# Patient Record
Sex: FEMALE | Race: OTHER | Employment: OTHER | URBAN - METROPOLITAN AREA
[De-identification: names, ages, dates, MRNs, and addresses within clinical notes are randomized per-mention and may not be internally consistent; named-entity substitution may affect disease eponyms.]

---

## 2017-01-10 ENCOUNTER — TELEPHONE (OUTPATIENT)
Dept: GASTROENTEROLOGY | Facility: CLINIC | Age: 72
End: 2017-01-10

## 2017-01-10 NOTE — TELEPHONE ENCOUNTER
[facility] is contacted again now to speak with live person/RN since procedure is scheduled in 2 days; call transferred to intake dept. - on hold for 15 minutes and then [facility] [address] came on line; she is not RN; she advised calling tomorrow morning to check status, but

## 2017-01-10 NOTE — TELEPHONE ENCOUNTER
Per Song Singh in insurance verification, 1/12/17 CLN requires prior authorization with [facility]- call [phone].

## 2017-01-10 NOTE — TELEPHONE ENCOUNTER
[facility] contacted re: below- on hold for 11 minutes and then request came on message to leave voicemail; same no is called again now in hopes of speaking with live person- on hold this time for 10 minutes, then Donnie Esteves came on line- reference no.: [id number]; call

## 2017-01-12 ENCOUNTER — ANESTHESIA (OUTPATIENT)
Dept: ENDOSCOPY | Facility: HOSPITAL | Age: 72
End: 2017-01-12
Payer: COMMERCIAL

## 2017-01-12 ENCOUNTER — SURGERY (OUTPATIENT)
Age: 72
End: 2017-01-12

## 2017-01-12 ENCOUNTER — ANESTHESIA EVENT (OUTPATIENT)
Dept: ENDOSCOPY | Facility: HOSPITAL | Age: 72
End: 2017-01-12
Payer: COMMERCIAL

## 2017-01-12 RX ORDER — LABETALOL HYDROCHLORIDE 5 MG/ML
INJECTION, SOLUTION INTRAVENOUS AS NEEDED
Status: DISCONTINUED | OUTPATIENT
Start: 2017-01-12 | End: 2017-01-12 | Stop reason: SURG

## 2017-01-12 RX ORDER — SODIUM CHLORIDE, SODIUM LACTATE, POTASSIUM CHLORIDE, CALCIUM CHLORIDE 600; 310; 30; 20 MG/100ML; MG/100ML; MG/100ML; MG/100ML
INJECTION, SOLUTION INTRAVENOUS CONTINUOUS PRN
Status: DISCONTINUED | OUTPATIENT
Start: 2017-01-12 | End: 2017-01-12 | Stop reason: SURG

## 2017-01-12 RX ADMIN — SODIUM CHLORIDE, SODIUM LACTATE, POTASSIUM CHLORIDE, CALCIUM CHLORIDE: 600; 310; 30; 20 INJECTION, SOLUTION INTRAVENOUS at 10:34:00

## 2017-01-12 RX ADMIN — LABETALOL HYDROCHLORIDE 5 MG: 5 INJECTION, SOLUTION INTRAVENOUS at 10:52:00

## 2017-01-12 RX ADMIN — LABETALOL HYDROCHLORIDE 5 MG: 5 INJECTION, SOLUTION INTRAVENOUS at 10:55:00

## 2017-01-12 NOTE — ANESTHESIA PREPROCEDURE EVALUATION
Anesthesia PreOp Note    HPI:     Cal Labrum is a 80year old female who presents for preoperative consultation requested by: Juan Camacho MD    Date of Surgery: 1/12/2017    Procedure(s):  COLONOSCOPY  Indication: Diverticulitis of large intestin OTHER SURGICAL HISTORY  1995    Comment Open cholecystectomy    ARTHROSCOPY, SHOULDER, SURGI      Comment 2015 rotator cufff repain    ARTHROSCOPY, SHOULDER, SURGICAL; W/ROTATOR CUFF REPAIR Right 5/27/2015    Comment Procedure: ARTHROSCOPY SHOULDER WITH R FREESTYLE TEST In Vitro Strip TEST TWICE DAILY Disp: 100 strip Rfl: 3 Taking   insulin detemir (LEVEMIR FLEXTOUCH) 100 UNIT/ML Subcutaneous Solution Pen-injector Inject 25 Units into the skin daily.  Disp: 1 pen Rfl: 11 Taking   FreeStyle Lancets Does not a height is 5' 6\" and weight is 180 lb. Her blood pressure is 150/82 and her pulse is 69.  Her respiration is 12 and oxygen saturation is 96%.    01/12/17  1013   BP: 150/82   Pulse: 69   Resp: 12   Height: 5' 6\"   Weight: 180 lb   SpO2: 96%        Anesthe

## 2017-01-12 NOTE — ANESTHESIA POSTPROCEDURE EVALUATION
Patient: Krystle Rao    Procedure Summary     Date Anesthesia Start Anesthesia Stop Room / Location    01/12/17 1032 1106 [facility] ENDOSCOPY 05 / [facility] ENDOSCOPY       Procedure Diagnosis Surgeon Responsible Provider    COLONOSCOPY (N/A ) Diverticulitis of lar

## 2017-02-24 DIAGNOSIS — E11.9 TYPE 2 DIABETES MELLITUS TREATED WITHOUT INSULIN (HCC): Primary | ICD-10-CM

## 2017-02-24 NOTE — TELEPHONE ENCOUNTER
Patient needs diabetic panel follow-up this month diagnosis diabetes mellitus non-insulin-requiring no complications.   May renew the losartan

## 2017-02-28 RX ORDER — LOSARTAN POTASSIUM 50 MG/1
50 TABLET ORAL DAILY
Qty: 90 TABLET | Refills: 0 | Status: SHIPPED | OUTPATIENT
Start: 2017-02-28 | End: 2017-04-17

## 2017-02-28 RX ORDER — GLIMEPIRIDE 4 MG/1
4 TABLET ORAL 2 TIMES DAILY
Qty: 180 TABLET | Refills: 0 | Status: SHIPPED | OUTPATIENT
Start: 2017-02-28 | End: 2017-04-17

## 2017-02-28 RX ORDER — INSULIN DETEMIR 100 [IU]/ML
INJECTION, SOLUTION SUBCUTANEOUS
Qty: 15 ML | Refills: 0 | Status: SHIPPED | OUTPATIENT
Start: 2017-02-28 | End: 2017-06-29

## 2017-02-28 NOTE — TELEPHONE ENCOUNTER
Sent per clinical decision with note to complete pending lab and schedule f/u  Diabetes Medications  Protocol Criteria:  · Appointment scheduled in the past 6 months or the next 3 months  · A1C < 7.5 in the past 6 months  · Creatinine in the past 12 months

## 2017-04-15 ENCOUNTER — TELEPHONE (OUTPATIENT)
Dept: FAMILY MEDICINE CLINIC | Facility: CLINIC | Age: 72
End: 2017-04-15

## 2017-04-15 NOTE — TELEPHONE ENCOUNTER
EBONIE - Belarusian; Patient called wanting to make an appointment with Dr. Christelle Gutierrez regarding a hard cough, flem, headaches, and sweats.  The doctor's schedule is far out in May and did not want to wait so long for an appointment - offered her a different doctor and

## 2017-04-15 NOTE — TELEPHONE ENCOUNTER
Actions Requested: seeking appt, appt made, routed to [facility] as FYI  Situation/Background   Problem: productive cough   Onset: 3 days   Associated Symptoms: productive cough-thick mucus, nasal congestion, sinus congestion with headaches, sweats.  Pt denied fev

## 2017-04-17 ENCOUNTER — OFFICE VISIT (OUTPATIENT)
Dept: FAMILY MEDICINE CLINIC | Facility: CLINIC | Age: 72
End: 2017-04-17

## 2017-04-17 VITALS
SYSTOLIC BLOOD PRESSURE: 137 MMHG | WEIGHT: 177.81 LBS | DIASTOLIC BLOOD PRESSURE: 80 MMHG | HEIGHT: 63 IN | HEART RATE: 73 BPM | TEMPERATURE: 98 F | BODY MASS INDEX: 31.5 KG/M2

## 2017-04-17 DIAGNOSIS — E11.9 DIABETES MELLITUS WITH INSULIN THERAPY (HCC): Primary | ICD-10-CM

## 2017-04-17 DIAGNOSIS — Z79.4 DIABETES MELLITUS WITH INSULIN THERAPY (HCC): Primary | ICD-10-CM

## 2017-04-17 DIAGNOSIS — R05.9 COUGH: ICD-10-CM

## 2017-04-17 PROCEDURE — 99212 OFFICE O/P EST SF 10 MIN: CPT | Performed by: FAMILY MEDICINE

## 2017-04-17 PROCEDURE — 99214 OFFICE O/P EST MOD 30 MIN: CPT | Performed by: FAMILY MEDICINE

## 2017-04-17 RX ORDER — GLIMEPIRIDE 4 MG/1
4 TABLET ORAL 2 TIMES DAILY
Qty: 180 TABLET | Refills: 2 | Status: SHIPPED | OUTPATIENT
Start: 2017-04-17 | End: 2017-08-08

## 2017-04-17 RX ORDER — LOSARTAN POTASSIUM 50 MG/1
50 TABLET ORAL DAILY
Qty: 90 TABLET | Refills: 2 | Status: SHIPPED | OUTPATIENT
Start: 2017-04-17 | End: 2017-08-08

## 2017-04-17 RX ORDER — AZITHROMYCIN 250 MG/1
TABLET, FILM COATED ORAL
Qty: 6 TABLET | Refills: 0 | Status: SHIPPED | OUTPATIENT
Start: 2017-04-17 | End: 2017-05-08 | Stop reason: ALTCHOICE

## 2017-04-17 RX ORDER — BENZONATATE 200 MG/1
200 CAPSULE ORAL 3 TIMES DAILY PRN
Qty: 30 CAPSULE | Refills: 0 | Status: SHIPPED | OUTPATIENT
Start: 2017-04-17 | End: 2017-05-08 | Stop reason: ALTCHOICE

## 2017-04-17 NOTE — PROGRESS NOTES
HPI:   Aimee Marquez is a 71year old female who presents for upper respiratory symptoms for  3  days. Patient reports low grade fever, cough with yellow colored sputum. Reports significant fatigue also with this. Has not checked her glucose.        Medardo Deras hypertension    • Hypercholesterolemia    • Cold with flu    • Reflux      Takes OTC meds PRN \"Tums\"   • High blood pressure           Past Surgical History    ELECTROCARDIOGRAM, COMPLETE  4/21/2014    Comment Scanned to media tab    HYSTERECTOMY  2014 rashes  EYES:conjunctiva are clear  HEENT: atraumatic, normocephalic,ears and throat are clear. Nl TMs  NECK: supple,no adenopathy,no bruits  LUNGS: clear to auscultation  CARDIO: RRR without murmur      ASSESSMENT AND PLAN:   1.  Diabetes mellitus with ins

## 2017-04-19 ENCOUNTER — LAB ENCOUNTER (OUTPATIENT)
Dept: LAB | Facility: HOSPITAL | Age: 72
End: 2017-04-19
Attending: FAMILY MEDICINE
Payer: COMMERCIAL

## 2017-04-19 DIAGNOSIS — Z79.4 DIABETES MELLITUS WITH INSULIN THERAPY (HCC): ICD-10-CM

## 2017-04-19 DIAGNOSIS — E11.9 DIABETES MELLITUS WITH INSULIN THERAPY (HCC): ICD-10-CM

## 2017-04-19 PROCEDURE — 36415 COLL VENOUS BLD VENIPUNCTURE: CPT

## 2017-04-19 PROCEDURE — 80053 COMPREHEN METABOLIC PANEL: CPT

## 2017-04-19 PROCEDURE — 82570 ASSAY OF URINE CREATININE: CPT

## 2017-04-19 PROCEDURE — 82043 UR ALBUMIN QUANTITATIVE: CPT

## 2017-04-19 PROCEDURE — 80061 LIPID PANEL: CPT

## 2017-04-19 PROCEDURE — 83036 HEMOGLOBIN GLYCOSYLATED A1C: CPT

## 2017-04-19 PROCEDURE — 84443 ASSAY THYROID STIM HORMONE: CPT

## 2017-04-19 PROCEDURE — 85025 COMPLETE CBC W/AUTO DIFF WBC: CPT

## 2017-04-20 NOTE — PROGRESS NOTES
Quick Note:    Diabetes completely uncontrolled. Please talk to her about. taking her medications - insulin daily.  She often skips  ______

## 2017-04-21 ENCOUNTER — TELEPHONE (OUTPATIENT)
Dept: FAMILY MEDICINE CLINIC | Facility: CLINIC | Age: 72
End: 2017-04-21

## 2017-04-21 NOTE — TELEPHONE ENCOUNTER
----- Message from Derian Valencia MD sent at 4/20/2017  1:06 PM CDT -----  Diabetes completely uncontrolled. Please talk to her about. taking her medications - insulin daily.  She often skips

## 2017-04-22 NOTE — TELEPHONE ENCOUNTER
Spoke with patient verified date of birth and full name, patient was relayed  message below. Pt was inform that her sugar levels are really high and she needs to take per pills and inject her insulin everyday.  These level can have a effect on her kidn

## 2017-05-08 ENCOUNTER — OFFICE VISIT (OUTPATIENT)
Dept: FAMILY MEDICINE CLINIC | Facility: CLINIC | Age: 72
End: 2017-05-08

## 2017-05-08 VITALS
SYSTOLIC BLOOD PRESSURE: 132 MMHG | HEART RATE: 75 BPM | WEIGHT: 181.38 LBS | BODY MASS INDEX: 32 KG/M2 | DIASTOLIC BLOOD PRESSURE: 82 MMHG

## 2017-05-08 DIAGNOSIS — H40.009 GLAUCOMA SUSPECT, UNSPECIFIED LATERALITY: ICD-10-CM

## 2017-05-08 DIAGNOSIS — E11.9 TYPE 2 DIABETES MELLITUS WITHOUT COMPLICATION, WITH LONG-TERM CURRENT USE OF INSULIN (HCC): Primary | ICD-10-CM

## 2017-05-08 DIAGNOSIS — E11.49 OTHER DIABETIC NEUROLOGICAL COMPLICATION ASSOCIATED WITH TYPE 2 DIABETES MELLITUS (HCC): ICD-10-CM

## 2017-05-08 DIAGNOSIS — Z79.4 TYPE 2 DIABETES MELLITUS WITHOUT COMPLICATION, WITH LONG-TERM CURRENT USE OF INSULIN (HCC): Primary | ICD-10-CM

## 2017-05-08 DIAGNOSIS — I15.9 SECONDARY HYPERTENSION: ICD-10-CM

## 2017-05-08 PROCEDURE — 99212 OFFICE O/P EST SF 10 MIN: CPT | Performed by: FAMILY MEDICINE

## 2017-05-08 PROCEDURE — 99214 OFFICE O/P EST MOD 30 MIN: CPT | Performed by: FAMILY MEDICINE

## 2017-05-08 RX ORDER — PREGABALIN 50 MG/1
50 CAPSULE ORAL 2 TIMES DAILY
Qty: 60 CAPSULE | Refills: 1 | Status: SHIPPED | OUTPATIENT
Start: 2017-05-08 | End: 2017-08-08

## 2017-05-08 NOTE — PROGRESS NOTES
Phuc Lehman is a 71year old female. Patient presents with:  Diabetes    HPI:   Here for follow up on diabetes. Does not always use her insulin because gets tired of using it per pt. Gets down about it - reports feeling tired. Stays busy overall.  Feels Used                        Comment: Quit 1990    Alcohol Use: No                 REVIEW OF SYSTEMS:   GENERAL HEALTH: feels well otherwise  SKIN: denies any unusual skin lesions or rashes  HEENT: denies eye complaints,denies sore throat, denies ear pain

## 2017-05-09 ENCOUNTER — TELEPHONE (OUTPATIENT)
Dept: FAMILY MEDICINE CLINIC | Facility: CLINIC | Age: 72
End: 2017-05-09

## 2017-05-10 NOTE — TELEPHONE ENCOUNTER
PA for Diclofenac sodium 1% gel completed with [employer] via [employer] [place] Medication approved effective 4/10/2017-5/10/2018 case# [id number].

## 2017-05-15 NOTE — TELEPHONE ENCOUNTER
Pt is calling for status of the prior authorization. Pt would like a call back at [phone]. Pt is requesting return call in [address] [facility]

## 2017-05-16 NOTE — TELEPHONE ENCOUNTER
Contacted [facility] pharmacist states medication is ready for . Pharmacy to call patient.

## 2017-06-07 ENCOUNTER — OFFICE VISIT (OUTPATIENT)
Dept: FAMILY MEDICINE CLINIC | Facility: CLINIC | Age: 72
End: 2017-06-07

## 2017-06-07 VITALS
TEMPERATURE: 98 F | DIASTOLIC BLOOD PRESSURE: 77 MMHG | WEIGHT: 183 LBS | HEART RATE: 74 BPM | BODY MASS INDEX: 32 KG/M2 | SYSTOLIC BLOOD PRESSURE: 133 MMHG

## 2017-06-07 DIAGNOSIS — M67.432 GANGLION CYST OF WRIST, LEFT: Primary | ICD-10-CM

## 2017-06-07 PROCEDURE — 99214 OFFICE O/P EST MOD 30 MIN: CPT | Performed by: NURSE PRACTITIONER

## 2017-06-07 NOTE — PATIENT INSTRUCTIONS
STRAINS, SPRAINS, MUSCLE PULLS    *A strain is a stretching or tearing of muscle or tendon. A tendon is a fibrous cord of tissue that connects muscles to bones.      *A sprain is a stretching or tearing of ligaments — the tough bands of fibrous tissue that helpful. After the first two days, gently begin to use the injured area. You should see a gradual, progressive improvement in the joint's ability to support your weight or your ability to move without pain.  Mild and moderate sprains usually heal in thre

## 2017-06-07 NOTE — PROGRESS NOTES
Wrist Pain   The pain is present in the left wrist. This is a new problem. The current episode started more than 1 month ago. There has been no history of extremity trauma. The problem occurs constantly. The problem has been unchanged.  The quality of the p Scanned to media tab   • Hysterectomy  2014     TLH/BSO/ A&P repair/ uteroscral spine fixation   • Other surgical history Right 2012     Arthroscopy   • Other surgical history  1995     Open cholecystectomy   • Arthroscopy, shoulder, surgi       2015 rot Losartan Potassium 50 MG Oral Tab Take 1 tablet (50 mg total) by mouth daily.  Disp: 90 tablet Rfl: 2   MetFORMIN HCl 850 MG Oral Tab TAKE ONE TABLET BY MOUTH THREE TIMES DAILY WITH MEALS Disp: 270 tablet Rfl: 2   LEVEMIR FLEXTOUCH 100 UNIT/ML Subcutaneous

## 2017-06-29 ENCOUNTER — TELEPHONE (OUTPATIENT)
Dept: FAMILY MEDICINE CLINIC | Facility: CLINIC | Age: 72
End: 2017-06-29

## 2017-06-29 NOTE — TELEPHONE ENCOUNTER
Diabetes Medications  Protocol Criteria:  · Appointment scheduled in the past 6 months or the next 3 months  · A1C < 7.5 in the past 6 months  · Creatinine in the past 12 months  · Creatinine result < 1.5   Recent Outpatient Visits            3 weeks ago G

## 2017-06-29 NOTE — TELEPHONE ENCOUNTER
Gibraltarian SPEAKER --  Pt calling has been out of insulin for over.      Current Outpatient Prescriptions:  LEVEMIR FLEXTOUCH 100 UNIT/ML Subcutaneous Solution Pen-injector INJECT 20 UNITS UNDER THE SKIN EVERY DAY Disp: 15 mL Rfl: 0

## 2017-06-29 NOTE — TELEPHONE ENCOUNTER
Advised daughter that Rx was sent to patient's pharmacy. If any questions for the patient to give us a call back. Daughter verbalized understanding.     Radha Barry [address],

## 2017-06-29 NOTE — TELEPHONE ENCOUNTER
East Timorese SPEAKER - pt calling c/o of being out of insulin for over a week. States feels very tired and low energy states last time she took sugar level it was 159. Please advise unable to transfer to triage due to phone issues.

## 2017-06-29 NOTE — TELEPHONE ENCOUNTER
Patient has been out of insulin for over 1 week and did not call us because did not want to be bothered with the refill process. Can we approve a years supply of insulin?  Patient uses 1 insulin pen daily per .

## 2017-07-18 ENCOUNTER — OFFICE VISIT (OUTPATIENT)
Dept: ORTHOPEDICS CLINIC | Facility: CLINIC | Age: 72
End: 2017-07-18

## 2017-07-18 VITALS — HEART RATE: 76 BPM | DIASTOLIC BLOOD PRESSURE: 98 MMHG | SYSTOLIC BLOOD PRESSURE: 170 MMHG | RESPIRATION RATE: 16 BRPM

## 2017-07-18 DIAGNOSIS — M65.4 DE QUERVAIN'S TENOSYNOVITIS, LEFT: Primary | ICD-10-CM

## 2017-07-18 DIAGNOSIS — M67.432 GANGLION, LEFT WRIST: ICD-10-CM

## 2017-07-18 PROCEDURE — L3908 WHO COCK-UP NONMOLDE PRE OTS: HCPCS | Performed by: ORTHOPAEDIC SURGERY

## 2017-07-18 PROCEDURE — 99212 OFFICE O/P EST SF 10 MIN: CPT | Performed by: ORTHOPAEDIC SURGERY

## 2017-07-18 PROCEDURE — 99213 OFFICE O/P EST LOW 20 MIN: CPT | Performed by: ORTHOPAEDIC SURGERY

## 2017-07-18 PROCEDURE — 20550 NJX 1 TENDON SHEATH/LIGAMENT: CPT | Performed by: ORTHOPAEDIC SURGERY

## 2017-07-18 PROCEDURE — L3808 WHFO, RIGID W/O JOINTS: HCPCS | Performed by: ORTHOPAEDIC SURGERY

## 2017-07-18 RX ORDER — BETAMETHASONE SODIUM PHOSPHATE AND BETAMETHASONE ACETATE 3; 3 MG/ML; MG/ML
9 INJECTION, SUSPENSION INTRA-ARTICULAR; INTRALESIONAL; INTRAMUSCULAR; SOFT TISSUE ONCE
Status: COMPLETED | OUTPATIENT
Start: 2017-07-18 | End: 2017-07-18

## 2017-07-18 RX ADMIN — BETAMETHASONE SODIUM PHOSPHATE AND BETAMETHASONE ACETATE 9 MG: 3; 3 INJECTION, SUSPENSION INTRA-ARTICULAR; INTRALESIONAL; INTRAMUSCULAR; SOFT TISSUE at 10:30:00

## 2017-07-18 NOTE — PROGRESS NOTES
Per verbal order from THERESA, draw up 1.5ml of 1% lidocaine and 1.5ml of Celestone for cortisone injection to left wrist.  Jonas Bowie RN

## 2017-07-18 NOTE — PROCEDURES
The patient desired injection. Under sterile conditions and following informed consent, the patient was injected with lidocaine and celestone in their left wrist first dorsal compartment.   The patient tolerated the procedure well and there were no complic

## 2017-07-19 NOTE — PROGRESS NOTES
7/18/2017  Verna Zapata  2/18/1945  71year old   female  Skyler Bermudez MD    HPI:   Patient presents with:  Consult: left wrist ganglion -- Rates pain 7-8/10. Right handed. The patient is right-handed.   Date of injury/ onset of symptoms: for a (juvenile type) diabetes mellitus without mention of complication, not stated as uncontrolled    • Unspecified essential hypertension    • Visual floaters 2009      Past Surgical History:  No date: ARTHROSCOPY, SHOULDER, SURGI      Comment: 2015 rotator cu well appearing. The patient has normal mood. The patient is non-tender and atraumatic with the exception of their left upper extremity. The patient's skin is intact and compartments are soft.   The patient's upper extremities are warm and pink with brisk treatment plan.     The patient will return to clinic in 6 weeks as needed      Orders Placed This Encounter      tendon sheath/ligament

## 2017-07-26 ENCOUNTER — OFFICE VISIT (OUTPATIENT)
Dept: OPHTHALMOLOGY | Facility: CLINIC | Age: 72
End: 2017-07-26

## 2017-07-26 DIAGNOSIS — H25.13 AGE-RELATED NUCLEAR CATARACT OF BOTH EYES: ICD-10-CM

## 2017-07-26 DIAGNOSIS — H43.393 FLOATERS, BILATERAL: ICD-10-CM

## 2017-07-26 DIAGNOSIS — H40.003 GLAUCOMA SUSPECT, BILATERAL: ICD-10-CM

## 2017-07-26 DIAGNOSIS — E10.9 TYPE 1 DIABETES MELLITUS WITHOUT RETINOPATHY (HCC): Primary | ICD-10-CM

## 2017-07-26 PROBLEM — H43.399 FLOATERS: Status: ACTIVE | Noted: 2017-07-26

## 2017-07-26 PROCEDURE — 92250 FUNDUS PHOTOGRAPHY W/I&R: CPT | Performed by: OPHTHALMOLOGY

## 2017-07-26 PROCEDURE — 99212 OFFICE O/P EST SF 10 MIN: CPT | Performed by: OPHTHALMOLOGY

## 2017-07-26 PROCEDURE — 99243 OFF/OP CNSLTJ NEW/EST LOW 30: CPT | Performed by: OPHTHALMOLOGY

## 2017-07-26 NOTE — PROGRESS NOTES
Edwardo Casillas is a 71year old female. HPI:     HPI     Diabetic Eye Exam    Additional comments: Pt has been a diabetic for 8 years8 years on pills/  5 years on Insulin ( patient on pills and Insulin for the past 5 years) .  Pt checks her BS once a day ROTATOR CUFF REPAIR;  Surgeon: Candie De Leon MD;  Location: [facility]); cholecystectomy; and colonoscopy (N/A, 1/12/2017) (Procedure: COLONOSCOPY;  Surgeon: Shruthi Berman MD;  Location: [facility]).     Family History   Problem Relation A O.T. on 7/26/2017  1:31 PM. (History)          PHYSICAL EXAM:     Base Eye Exam     Visual Acuity (Snellen - Linear)       Right Left    Dist sc 20/40 20/40 -1    Dist ph sc 20/30 20/30          Tonometry (Applanation, 1:42 PM)       Right Left    Pressure to observe. Patient verbalized understanding. Age-related nuclear cataract of both eyes  Discussed early cataracts with patient. No treatment recommended at this time. Suggest +2.50 over the counter glasses for reading.       Type 1 diabetes lalita

## 2017-07-26 NOTE — ASSESSMENT & PLAN NOTE
Diabetes type I: no background retinopathy, no signs of neovascularization noted. Discussed ocular and systemic benefits of blood sugar control. Recommend possible referral to endocrinologist for better glycemic control.

## 2017-07-26 NOTE — PATIENT INSTRUCTIONS
Glaucoma suspect  Discussed with patient that she is a glaucoma suspect based on increased cupping of the optic nerves in both eyes. Retinal photos taken today to document optic nerves. Glaucoma diagnostic testing ordered.   Will not start medication, but

## 2017-07-26 NOTE — ASSESSMENT & PLAN NOTE
Discussed early cataracts with patient. No treatment recommended at this time. Suggest +2.50 over the counter glasses for reading.

## 2017-08-08 ENCOUNTER — LAB ENCOUNTER (OUTPATIENT)
Dept: LAB | Age: 72
End: 2017-08-08
Attending: FAMILY MEDICINE
Payer: COMMERCIAL

## 2017-08-08 ENCOUNTER — OFFICE VISIT (OUTPATIENT)
Dept: FAMILY MEDICINE CLINIC | Facility: CLINIC | Age: 72
End: 2017-08-08

## 2017-08-08 VITALS
HEART RATE: 73 BPM | WEIGHT: 180 LBS | SYSTOLIC BLOOD PRESSURE: 140 MMHG | BODY MASS INDEX: 32 KG/M2 | DIASTOLIC BLOOD PRESSURE: 78 MMHG

## 2017-08-08 DIAGNOSIS — I15.9 SECONDARY HYPERTENSION: ICD-10-CM

## 2017-08-08 DIAGNOSIS — Z79.4 TYPE 2 DIABETES MELLITUS WITHOUT COMPLICATION, WITH LONG-TERM CURRENT USE OF INSULIN (HCC): ICD-10-CM

## 2017-08-08 DIAGNOSIS — E78.5 HYPERLIPIDEMIA, UNSPECIFIED HYPERLIPIDEMIA TYPE: ICD-10-CM

## 2017-08-08 DIAGNOSIS — E11.49 OTHER DIABETIC NEUROLOGICAL COMPLICATION ASSOCIATED WITH TYPE 2 DIABETES MELLITUS (HCC): Primary | ICD-10-CM

## 2017-08-08 DIAGNOSIS — E10.9 TYPE 1 DIABETES MELLITUS WITHOUT RETINOPATHY (HCC): ICD-10-CM

## 2017-08-08 DIAGNOSIS — Z78.0 POSTMENOPAUSAL: ICD-10-CM

## 2017-08-08 DIAGNOSIS — E11.9 TYPE 2 DIABETES MELLITUS WITHOUT COMPLICATION, WITH LONG-TERM CURRENT USE OF INSULIN (HCC): ICD-10-CM

## 2017-08-08 LAB — HBA1C MFR BLD: 10.4 % (ref 4–6)

## 2017-08-08 PROCEDURE — 36415 COLL VENOUS BLD VENIPUNCTURE: CPT

## 2017-08-08 PROCEDURE — 99214 OFFICE O/P EST MOD 30 MIN: CPT | Performed by: FAMILY MEDICINE

## 2017-08-08 PROCEDURE — 99212 OFFICE O/P EST SF 10 MIN: CPT | Performed by: FAMILY MEDICINE

## 2017-08-08 PROCEDURE — 83036 HEMOGLOBIN GLYCOSYLATED A1C: CPT

## 2017-08-08 RX ORDER — GLIMEPIRIDE 4 MG/1
4 TABLET ORAL 2 TIMES DAILY
Qty: 180 TABLET | Refills: 2 | Status: SHIPPED | OUTPATIENT
Start: 2017-08-08 | End: 2018-05-16

## 2017-08-08 RX ORDER — LOSARTAN POTASSIUM 100 MG/1
100 TABLET ORAL DAILY
Qty: 90 TABLET | Refills: 4 | Status: SHIPPED | OUTPATIENT
Start: 2017-08-08 | End: 2018-07-09

## 2017-08-08 RX ORDER — PREGABALIN 75 MG/1
75 CAPSULE ORAL 2 TIMES DAILY
Qty: 180 CAPSULE | Refills: 0 | Status: SHIPPED | OUTPATIENT
Start: 2017-08-08 | End: 2018-11-27

## 2017-08-08 NOTE — PROGRESS NOTES
Razia Tanner is a 71year old female. Patient presents with: Follow - Up    HPI:   Reports doing well - taking her insulin regularly now. Went to see eye doctor and no diabetic retinopathy found. Has another appointment for glaucoma testing.  Has been ch meds PRN \"Tums\"   • Type I (juvenile type) diabetes mellitus without mention of complication, not stated as uncontrolled    • Unspecified essential hypertension    • Visual floaters 2009      Social History:  Smoking status: Former Smoker indicates understanding of these issues and agrees to the plan.       Derian Valencia MD  8/8/2017  9:54 AM

## 2017-08-14 DIAGNOSIS — Z79.4 TYPE 2 DIABETES MELLITUS WITHOUT COMPLICATION, WITH LONG-TERM CURRENT USE OF INSULIN (HCC): Primary | ICD-10-CM

## 2017-08-14 DIAGNOSIS — E11.9 TYPE 2 DIABETES MELLITUS WITHOUT COMPLICATION, WITH LONG-TERM CURRENT USE OF INSULIN (HCC): Primary | ICD-10-CM

## 2017-08-14 NOTE — PROGRESS NOTES
Diabetes is actually worse. She needs to see endocrinologist - DM specialist. Referral placed - please help set up an appt.

## 2017-08-29 ENCOUNTER — OFFICE VISIT (OUTPATIENT)
Dept: OPHTHALMOLOGY | Facility: CLINIC | Age: 72
End: 2017-08-29

## 2017-08-29 DIAGNOSIS — H40.003 GLAUCOMA SUSPECT, BILATERAL: ICD-10-CM

## 2017-08-29 DIAGNOSIS — Z78.0 POSTMENOPAUSAL: ICD-10-CM

## 2017-08-29 PROCEDURE — 92083 EXTENDED VISUAL FIELD XM: CPT | Performed by: OPHTHALMOLOGY

## 2017-08-29 PROCEDURE — 92133 CPTRZD OPH DX IMG PST SGM ON: CPT | Performed by: OPHTHALMOLOGY

## 2017-08-29 NOTE — PROGRESS NOTES
Lauren Williamson is a 71year old female. HPI:     HPI     EP.   Patient is here for a VF and OCT with no MD.    Last edited by Mari Juarez O.T. on 8/29/2017 11:25 AM. (History)        Patient History:  Past Medical History:   Diagnosis Date   • Jeni Thakur Social History: Smoking status: Former Smoker                                                              Packs/day: 0.00      Years: 0.00         Types: Cigarettes  Smokeless tobacco: Never Used                      Comment: Quit 1990  Alcohol use:

## 2017-08-30 ENCOUNTER — HOSPITAL ENCOUNTER (OUTPATIENT)
Dept: BONE DENSITY | Facility: HOSPITAL | Age: 72
Discharge: HOME | End: 2017-08-30
Attending: FAMILY MEDICINE
Payer: COMMERCIAL

## 2017-08-30 PROCEDURE — 77080 DXA BONE DENSITY AXIAL: CPT | Performed by: FAMILY MEDICINE

## 2017-09-05 ENCOUNTER — HOSPITAL ENCOUNTER (OUTPATIENT)
Dept: GENERAL RADIOLOGY | Age: 72
Discharge: HOME | End: 2017-09-05
Attending: FAMILY MEDICINE
Payer: COMMERCIAL

## 2017-09-05 ENCOUNTER — OFFICE VISIT (OUTPATIENT)
Dept: FAMILY MEDICINE CLINIC | Facility: CLINIC | Age: 72
End: 2017-09-05

## 2017-09-05 VITALS
BODY MASS INDEX: 32 KG/M2 | SYSTOLIC BLOOD PRESSURE: 130 MMHG | DIASTOLIC BLOOD PRESSURE: 80 MMHG | WEIGHT: 178.63 LBS | HEART RATE: 63 BPM

## 2017-09-05 DIAGNOSIS — E11.9 TYPE 2 DIABETES MELLITUS WITHOUT COMPLICATION, WITH LONG-TERM CURRENT USE OF INSULIN (HCC): Primary | ICD-10-CM

## 2017-09-05 DIAGNOSIS — M79.671 RIGHT FOOT PAIN: ICD-10-CM

## 2017-09-05 DIAGNOSIS — Z79.4 TYPE 2 DIABETES MELLITUS WITHOUT COMPLICATION, WITH LONG-TERM CURRENT USE OF INSULIN (HCC): Primary | ICD-10-CM

## 2017-09-05 DIAGNOSIS — I15.9 SECONDARY HYPERTENSION: ICD-10-CM

## 2017-09-05 PROCEDURE — 99214 OFFICE O/P EST MOD 30 MIN: CPT | Performed by: FAMILY MEDICINE

## 2017-09-05 PROCEDURE — 99212 OFFICE O/P EST SF 10 MIN: CPT | Performed by: FAMILY MEDICINE

## 2017-09-05 PROCEDURE — 73630 X-RAY EXAM OF FOOT: CPT | Performed by: FAMILY MEDICINE

## 2017-09-05 NOTE — PROGRESS NOTES
Lori Oliva is a 71year old female. Patient presents with: Follow - Up: right foot     HPI:   Reports worsening foot pain - pain in right foot- heel. Taking lyrica twice a day but not helping that specific pain.   Helps the burning sensation in her leg • Type I (juvenile type) diabetes mellitus without mention of complication, not stated as uncontrolled    • Unspecified essential hypertension    • Visual floaters 2009      Social History:  Smoking status: Former Smoker

## 2017-09-11 ENCOUNTER — TELEPHONE (OUTPATIENT)
Dept: OTHER | Age: 72
End: 2017-09-11

## 2017-09-11 NOTE — TELEPHONE ENCOUNTER
LMTCB, please transfer to triage                Status:  Final result   Visible to patient:  No (Not Released) Dx:  Right foot pain   Notes Recorded by Quinn Doll MD on 9/6/2017 at 10:25 AM CDT  There is a small bone growth at the heel - may be caus

## 2017-09-12 NOTE — TELEPHONE ENCOUNTER
With  Danielle Morejon ID #[id number]), Spoke with patient (identified name and ) ,results reviewed and agrees with  plan.       Notes Recorded by Nelia Marcos MD on 2017 at 10:25 AM CDT    There is a small bone growth at the heel - may be

## 2017-09-15 ENCOUNTER — OFFICE VISIT (OUTPATIENT)
Dept: ENDOCRINOLOGY CLINIC | Facility: CLINIC | Age: 72
End: 2017-09-15

## 2017-09-15 ENCOUNTER — TELEPHONE (OUTPATIENT)
Dept: ENDOCRINOLOGY CLINIC | Facility: CLINIC | Age: 72
End: 2017-09-15

## 2017-09-15 VITALS
HEART RATE: 72 BPM | WEIGHT: 180.19 LBS | SYSTOLIC BLOOD PRESSURE: 117 MMHG | HEIGHT: 66 IN | BODY MASS INDEX: 28.96 KG/M2 | DIASTOLIC BLOOD PRESSURE: 72 MMHG

## 2017-09-15 DIAGNOSIS — E11.65 UNCONTROLLED TYPE 2 DIABETES MELLITUS WITH HYPERGLYCEMIA, WITH LONG-TERM CURRENT USE OF INSULIN (HCC): Primary | ICD-10-CM

## 2017-09-15 DIAGNOSIS — Z79.4 UNCONTROLLED TYPE 2 DIABETES MELLITUS WITH HYPERGLYCEMIA, WITH LONG-TERM CURRENT USE OF INSULIN (HCC): Primary | ICD-10-CM

## 2017-09-15 PROBLEM — E10.9 TYPE 1 DIABETES MELLITUS WITHOUT RETINOPATHY (HCC): Status: RESOLVED | Noted: 2017-07-26 | Resolved: 2017-09-15

## 2017-09-15 LAB
GLUCOSE BLOOD: 244
TEST STRIP LOT #: NORMAL NUMERIC

## 2017-09-15 PROCEDURE — 36416 COLLJ CAPILLARY BLOOD SPEC: CPT | Performed by: INTERNAL MEDICINE

## 2017-09-15 PROCEDURE — 82962 GLUCOSE BLOOD TEST: CPT | Performed by: INTERNAL MEDICINE

## 2017-09-15 PROCEDURE — 99244 OFF/OP CNSLTJ NEW/EST MOD 40: CPT | Performed by: INTERNAL MEDICINE

## 2017-09-15 PROCEDURE — 99212 OFFICE O/P EST SF 10 MIN: CPT | Performed by: INTERNAL MEDICINE

## 2017-09-15 NOTE — H&P
New Patient Visit - Diabetes    CONSULT - Reason for Visit:  Diabetes management.     Requesting Physician: Lacho Mistry MD    CHIEF COMPLAINT:  Patient presents with:  Diabetes: Not taking levemir every day, type 2, dx 4 years ago, checking BG once pe History:   Diagnosis Date   • Cataract    • Cholelithiasis    • Cold with flu    • Cup to disc asymmetry    • Diabetes mellitus ([facility] [address] 2011    Diabetes no retinopathy   • High blood pressure    • History of pregnancy      x3 (max 10 lbs), Smoker                                                                Packs/day: 0.00      Years: 0.00           Types: Cigarettes    Smokeless tobacco: Former User                       Comment: Quit 1990    Alcohol use: No              Drug use:  No no bruising or bleeding, no rashes and no lesions  DIABETIC FOOT EXAM: normal monofilament sensation, normal pulses, no edema, no skin lesions      DATA:     Reviewed. A1c is 10.4 %       ASSESSMENT AND PLAN:    1.  DM:     Plan:  Discussed the pathogene aerobic exercise, and eating a diet rich in fruits and vegetables. RTC in 8 weeks. Call with BG as instructed.        Orders Placed This Encounter      POC Finger stick glucose [41530]      9/15/2017  Kaitlynn Miguel MD

## 2017-09-21 ENCOUNTER — OFFICE VISIT (OUTPATIENT)
Dept: PODIATRY CLINIC | Facility: CLINIC | Age: 72
End: 2017-09-21

## 2017-09-21 ENCOUNTER — TELEPHONE (OUTPATIENT)
Dept: OTHER | Age: 72
End: 2017-09-21

## 2017-09-21 DIAGNOSIS — M72.2 PLANTAR FASCIITIS OF RIGHT FOOT: Primary | ICD-10-CM

## 2017-09-21 PROCEDURE — 99212 OFFICE O/P EST SF 10 MIN: CPT | Performed by: PODIATRIST

## 2017-09-21 PROCEDURE — L3060 FOOT ARCH SUPP LONGITUD/META: HCPCS | Performed by: PODIATRIST

## 2017-09-21 PROCEDURE — 99203 OFFICE O/P NEW LOW 30 MIN: CPT | Performed by: PODIATRIST

## 2017-09-21 RX ORDER — LANCETS 30 GAUGE
EACH MISCELLANEOUS
Qty: 200 EACH | Refills: 3 | Status: SHIPPED | OUTPATIENT
Start: 2017-09-21

## 2017-09-21 NOTE — TELEPHONE ENCOUNTER
Refill Protocol Appointment Criteria  · Appointment scheduled in the past 6 months or in the next 3 months  Recent Outpatient Visits            Today     [facility].  [address], [place], [place], [place]    Office Visit    6 days ago Johnny

## 2017-09-21 NOTE — PROGRESS NOTES
HPI:    Patient ID: Phuc Lehman is a 71year old female. HPI  36-year-old female presents as a new patient to me on referral from [facility]. Patient is here for the purpose of right heel pain.   I spoke to this patient through the language line because PHYSICAL EXAM:   Physical Exam  Hair growth is noted on her toes. Her nails are appropriately cared for. Pain is noted on direct palpation of the fascial insertion into the calcaneus. X-ray taken earlier this month demonstrates inferior spurring.   I d

## 2017-09-22 NOTE — TELEPHONE ENCOUNTER
Brandi Rodriguezer with Uzbek interpretor. Attempted to obtain BG readings for last week. She did not  test strips and lancets until today and has not checked blood sugar at all this week.  Discussed importance of SBMG to tell MD if medications are w

## 2017-09-29 NOTE — TELEPHONE ENCOUNTER
Called Cheli Rao with [address] [facility],  [address] ID# [id number]. She is currently taking Levemir 20 units daily,  mg TID with meals, glimeperide 4 mg BID. She does not want to try farxiga. She is working on lifestyle changes.  Fasting sugars are: 195, 142, 131, 12

## 2017-10-02 ENCOUNTER — TELEPHONE (OUTPATIENT)
Dept: INTERNAL MEDICINE CLINIC | Facility: CLINIC | Age: 72
End: 2017-10-02

## 2017-10-09 NOTE — TELEPHONE ENCOUNTER
Called patient with [address]   ID [id number]. Patient is driving. She states she will call back tomorrow with her blood sugars.

## 2018-01-05 ENCOUNTER — NURSE TRIAGE (OUTPATIENT)
Dept: OTHER | Age: 73
End: 2018-01-05

## 2018-01-05 ENCOUNTER — TELEPHONE (OUTPATIENT)
Dept: OTHER | Age: 73
End: 2018-01-05

## 2018-01-09 ENCOUNTER — OFFICE VISIT (OUTPATIENT)
Dept: FAMILY MEDICINE CLINIC | Facility: CLINIC | Age: 73
End: 2018-01-09

## 2018-01-09 VITALS
SYSTOLIC BLOOD PRESSURE: 135 MMHG | DIASTOLIC BLOOD PRESSURE: 79 MMHG | HEIGHT: 66 IN | BODY MASS INDEX: 27.8 KG/M2 | HEART RATE: 69 BPM | WEIGHT: 173 LBS

## 2018-01-09 DIAGNOSIS — E11.9 TYPE 2 DIABETES MELLITUS WITHOUT COMPLICATION, WITH LONG-TERM CURRENT USE OF INSULIN (HCC): Primary | ICD-10-CM

## 2018-01-09 DIAGNOSIS — Z79.4 TYPE 2 DIABETES MELLITUS WITHOUT COMPLICATION, WITH LONG-TERM CURRENT USE OF INSULIN (HCC): Primary | ICD-10-CM

## 2018-01-09 PROCEDURE — 99213 OFFICE O/P EST LOW 20 MIN: CPT | Performed by: FAMILY MEDICINE

## 2018-01-09 PROCEDURE — 99212 OFFICE O/P EST SF 10 MIN: CPT | Performed by: FAMILY MEDICINE

## 2018-01-09 PROCEDURE — 90471 IMMUNIZATION ADMIN: CPT | Performed by: FAMILY MEDICINE

## 2018-01-09 PROCEDURE — 90653 IIV ADJUVANT VACCINE IM: CPT | Performed by: FAMILY MEDICINE

## 2018-01-09 NOTE — PROGRESS NOTES
Sherryle Simmering is a 71year old female. Patient presents with:  Diabetes    HPI:   Reports not using her insulin because it was not covered. Has not been using it for a month. Reports her glucose is 200s and high 100s.  Has been feeling better bad - very ti uncontrolled    • Unspecified essential hypertension    • Visual floaters 2009      Social History:  Smoking status: Former Smoker                                                              Packs/day: 0.00      Years: 0.00         Types: Cigarettes  Smok

## 2018-02-10 ENCOUNTER — LAB ENCOUNTER (OUTPATIENT)
Dept: LAB | Facility: HOSPITAL | Age: 73
End: 2018-02-10
Attending: FAMILY MEDICINE
Payer: COMMERCIAL

## 2018-02-10 DIAGNOSIS — E11.9 TYPE 2 DIABETES MELLITUS WITHOUT COMPLICATION, WITH LONG-TERM CURRENT USE OF INSULIN (HCC): ICD-10-CM

## 2018-02-10 DIAGNOSIS — Z79.4 TYPE 2 DIABETES MELLITUS WITHOUT COMPLICATION, WITH LONG-TERM CURRENT USE OF INSULIN (HCC): ICD-10-CM

## 2018-02-10 LAB
ALBUMIN SERPL BCP-MCNC: 3.8 G/DL (ref 3.5–4.8)
ALBUMIN/GLOB SERPL: 1.1 {RATIO} (ref 1–2)
ALP SERPL-CCNC: 92 U/L (ref 32–100)
ALT SERPL-CCNC: 15 U/L (ref 14–54)
ANION GAP SERPL CALC-SCNC: 7 MMOL/L (ref 0–18)
AST SERPL-CCNC: 16 U/L (ref 15–41)
BASOPHILS # BLD: 0.1 K/UL (ref 0–0.2)
BASOPHILS NFR BLD: 1 %
BILIRUB SERPL-MCNC: 0.5 MG/DL (ref 0.3–1.2)
BUN SERPL-MCNC: 13 MG/DL (ref 8–20)
BUN/CREAT SERPL: 26 (ref 10–20)
CALCIUM SERPL-MCNC: 9.5 MG/DL (ref 8.5–10.5)
CHLORIDE SERPL-SCNC: 103 MMOL/L (ref 95–110)
CHOLEST SERPL-MCNC: 178 MG/DL (ref 110–200)
CO2 SERPL-SCNC: 27 MMOL/L (ref 22–32)
CREAT SERPL-MCNC: 0.5 MG/DL (ref 0.5–1.5)
CREAT UR-MCNC: 132.1 MG/DL
EOSINOPHIL # BLD: 0.2 K/UL (ref 0–0.7)
EOSINOPHIL NFR BLD: 4 %
ERYTHROCYTE [DISTWIDTH] IN BLOOD BY AUTOMATED COUNT: 13.1 % (ref 11–15)
GLOBULIN PLAS-MCNC: 3.5 G/DL (ref 2.5–3.7)
GLUCOSE SERPL-MCNC: 186 MG/DL (ref 70–99)
HBA1C MFR BLD: 9.2 % (ref 4–6)
HCT VFR BLD AUTO: 38.3 % (ref 35–48)
HDLC SERPL-MCNC: 48 MG/DL
HGB BLD-MCNC: 13 G/DL (ref 12–16)
LDLC SERPL CALC-MCNC: 108 MG/DL (ref 0–99)
LYMPHOCYTES # BLD: 2.1 K/UL (ref 1–4)
LYMPHOCYTES NFR BLD: 35 %
MCH RBC QN AUTO: 30.7 PG (ref 27–32)
MCHC RBC AUTO-ENTMCNC: 34 G/DL (ref 32–37)
MCV RBC AUTO: 90.4 FL (ref 80–100)
MICROALBUMIN UR-MCNC: 2.4 MG/DL (ref 0–1.8)
MICROALBUMIN/CREAT UR: 18.2 MG/G{CREAT} (ref 0–20)
MONOCYTES # BLD: 0.5 K/UL (ref 0–1)
MONOCYTES NFR BLD: 8 %
NEUTROPHILS # BLD AUTO: 3.2 K/UL (ref 1.8–7.7)
NEUTROPHILS NFR BLD: 53 %
NONHDLC SERPL-MCNC: 130 MG/DL
OSMOLALITY UR CALC.SUM OF ELEC: 289 MOSM/KG (ref 275–295)
PATIENT FASTING: YES
PLATELET # BLD AUTO: 262 K/UL (ref 140–400)
PMV BLD AUTO: 9.6 FL (ref 7.4–10.3)
POTASSIUM SERPL-SCNC: 3.9 MMOL/L (ref 3.3–5.1)
PROT SERPL-MCNC: 7.3 G/DL (ref 5.9–8.4)
RBC # BLD AUTO: 4.24 M/UL (ref 3.7–5.4)
SODIUM SERPL-SCNC: 137 MMOL/L (ref 136–144)
TRIGL SERPL-MCNC: 111 MG/DL (ref 1–149)
TSH SERPL-ACNC: 1.64 UIU/ML (ref 0.45–5.33)
WBC # BLD AUTO: 6 K/UL (ref 4–11)

## 2018-02-10 PROCEDURE — 83036 HEMOGLOBIN GLYCOSYLATED A1C: CPT

## 2018-02-10 PROCEDURE — 82043 UR ALBUMIN QUANTITATIVE: CPT

## 2018-02-10 PROCEDURE — 36415 COLL VENOUS BLD VENIPUNCTURE: CPT

## 2018-02-10 PROCEDURE — 85025 COMPLETE CBC W/AUTO DIFF WBC: CPT

## 2018-02-10 PROCEDURE — 80061 LIPID PANEL: CPT

## 2018-02-10 PROCEDURE — 82570 ASSAY OF URINE CREATININE: CPT

## 2018-02-10 PROCEDURE — 84443 ASSAY THYROID STIM HORMONE: CPT

## 2018-02-10 PROCEDURE — 80053 COMPREHEN METABOLIC PANEL: CPT

## 2018-02-18 DIAGNOSIS — Z79.4 TYPE 2 DIABETES MELLITUS WITHOUT COMPLICATION, WITH LONG-TERM CURRENT USE OF INSULIN (HCC): Primary | ICD-10-CM

## 2018-02-18 DIAGNOSIS — E11.9 TYPE 2 DIABETES MELLITUS WITHOUT COMPLICATION, WITH LONG-TERM CURRENT USE OF INSULIN (HCC): Primary | ICD-10-CM

## 2018-02-18 NOTE — PROGRESS NOTES
Diabetes is slightly better. She was not using her insulin for some time but the glucose is still high on this blood test. Please find out if taking all her meds - including insulin daily and what her glucose is ranging. Repeat hgb a1c in 2 months.

## 2018-02-19 ENCOUNTER — TELEPHONE (OUTPATIENT)
Dept: OTHER | Age: 73
End: 2018-02-19

## 2018-02-19 NOTE — TELEPHONE ENCOUNTER
----- Message from Brett Douglas MD sent at 2/18/2018  8:03 AM CST -----  Diabetes is slightly better.  She was not using her insulin for some time but the glucose is still high on this blood test. Please find out if taking all her meds - including insul

## 2018-02-21 ENCOUNTER — OFFICE VISIT (OUTPATIENT)
Dept: FAMILY MEDICINE CLINIC | Facility: CLINIC | Age: 73
End: 2018-02-21

## 2018-02-21 VITALS
WEIGHT: 180 LBS | DIASTOLIC BLOOD PRESSURE: 78 MMHG | BODY MASS INDEX: 29 KG/M2 | SYSTOLIC BLOOD PRESSURE: 154 MMHG | HEART RATE: 71 BPM | TEMPERATURE: 98 F

## 2018-02-21 DIAGNOSIS — M17.12 PRIMARY OSTEOARTHRITIS OF LEFT KNEE: Primary | ICD-10-CM

## 2018-02-21 DIAGNOSIS — I15.9 SECONDARY HYPERTENSION: ICD-10-CM

## 2018-02-21 DIAGNOSIS — E11.9 TYPE 2 DIABETES MELLITUS TREATED WITHOUT INSULIN (HCC): ICD-10-CM

## 2018-02-21 PROCEDURE — 99212 OFFICE O/P EST SF 10 MIN: CPT | Performed by: FAMILY MEDICINE

## 2018-02-21 PROCEDURE — 99214 OFFICE O/P EST MOD 30 MIN: CPT | Performed by: FAMILY MEDICINE

## 2018-02-21 RX ORDER — MELOXICAM 15 MG/1
15 TABLET ORAL DAILY
Qty: 30 TABLET | Refills: 6 | Status: SHIPPED | OUTPATIENT
Start: 2018-02-21 | End: 2018-05-16

## 2018-02-21 NOTE — PROGRESS NOTES
2/21/2018  9:31 AM    Luis Enrique Hardy is a 68year old female.     Chief complaint(s): Patient presents with:  Knee Pain: pt c/o left knee pain   Burning Feet: pt c/o burning feet and pain on bilateral foot pain    HPI:     Luis Enrique Antony primary complaint i Location: [facility]  No date: CHOLECYSTECTOMY  1/12/2017: COLONOSCOPY N/A      Comment: Procedure: COLONOSCOPY;  Surgeon: Laly Albert MD;  Location: [address] [place] [address] ENDOSCOPY  4/21/2014: ELECTROCARDIOGRAM, COMPLETE      Comment: Scanned to me SKIN EVERY DAY Disp: 90 mL Rfl: 2   Lancets Does not apply Misc Check blood sugar 2 times daily Disp: 200 each Rfl: 3   pregabalin (LYRICA) 75 MG Oral Cap Take 1 capsule (75 mg total) by mouth 2 (two) times daily.  Disp: 180 capsule Rfl: 0   glimepiride 4 M rales.   Musculoskeletal:   Significant crepitus on both knee but normal full range of motion. Minimal point tenderness peripatellar. On the left knee   Lymphadenopathy:     She has no cervical adenopathy. Skin: No rash noted.        LABORATORY RESULTS: 15.0 %    140 - 400 K/UL   MPV 9.6 7.4 - 10.3 fL   Neutrophil % 53 %   Lymphocyte % 35 %   Monocyte % 8 %   Eosinophil % 4 %   Basophil % 1 %   Neutrophil Absolute 3.2 1.8 - 7.7 K/UL   Lymphocyte Absolute 2.1 1.0 - 4.0 K/UL   Monocyte Absolute 0.5 0 condition. Also, inform the doctor with any new symptoms or medications' side effects. Declined intra-articular injection. FOLLOW-UP: Schedule a follow-up visit in 1 month.          Orders This Visit:  No orders of the defined types were placed in this

## 2018-02-22 NOTE — TELEPHONE ENCOUNTER
Patient informed of results (identified name and ) and recommendations, as per Dr. Kirk Hence result note. Patient voices understanding and agrees with recommendations; will have A1C redrawn ~18.      In regards to LB's questions: pt reports has been [facility]

## 2018-03-14 ENCOUNTER — OFFICE VISIT (OUTPATIENT)
Dept: FAMILY MEDICINE CLINIC | Facility: CLINIC | Age: 73
End: 2018-03-14

## 2018-03-14 VITALS
BODY MASS INDEX: 29 KG/M2 | WEIGHT: 181 LBS | HEART RATE: 74 BPM | TEMPERATURE: 98 F | DIASTOLIC BLOOD PRESSURE: 87 MMHG | SYSTOLIC BLOOD PRESSURE: 160 MMHG

## 2018-03-14 DIAGNOSIS — M17.12 PRIMARY OSTEOARTHRITIS OF LEFT KNEE: Primary | ICD-10-CM

## 2018-03-14 DIAGNOSIS — E11.9 TYPE 2 DIABETES MELLITUS TREATED WITHOUT INSULIN (HCC): ICD-10-CM

## 2018-03-14 PROCEDURE — 99212 OFFICE O/P EST SF 10 MIN: CPT | Performed by: FAMILY MEDICINE

## 2018-03-14 PROCEDURE — 99214 OFFICE O/P EST MOD 30 MIN: CPT | Performed by: FAMILY MEDICINE

## 2018-03-14 NOTE — PROGRESS NOTES
3/14/2018  9:41 AM    Drea Skaggs is a 68year old female. Chief complaint(s): Patient presents with: Follow - Up  Knee Pain  Arthritis    HPI:     Drea Skaggs primary complaint is regarding arthritis.      Patient to be evaluated for osteoarthrit to disc asymmetry    • Diabetes mellitus ([facility] [address] ,     Diabetes no retinopathy   • High blood pressure    • History of pregnancy      x3 (max 10 lbs), SAB x1   • Hypercholesterolemia    • Lipid screening 2014   • Meniscus tear     Fo use: No                 Immunizations:     Immunization History  Administered            Date(s) Administered    Fluad High dose 72 yr and older (92072)                          01/09/2018      Influenza             10/13/2009  10/08/2013      Influenza Va palpitations. Gastrointestinal: Negative for nausea, vomiting, abdominal pain, diarrhea and constipation. Endocrine: Positive for polydipsia and polyuria. Musculoskeletal: Positive for myalgias and joint pain. Negative for back pain.    Neurological: 99 mg/dL   Sodium 137 136 - 144 mmol/L   Potassium 3.9 3.3 - 5.1 mmol/L   Chloride 103 95 - 110 mmol/L   CO2 27 22 - 32 mmol/L   BUN 13 8 - 20 mg/dL   Creatinine 0.50 0.50 - 1.50 mg/dL   Calcium, Total 9.5 8.5 - 10.5 mg/dL   ALT 15 14 - 54 U/L   AST 16 15 any questions or concerns. Notify Dr Karson Faulkner or the [facility], [facility] if there is a deterioration or worsening of the medical condition. Also, inform the doctor with any new symptoms or medications' side effects.       FOLLOW-UP: Schedule a follow-up visit i checked quarterly, daily foot self-inspection, need for yearly flu shots, and avoid all sodas, juices, candy, chocolates, cakes, ice cream, etc.      FOLLOW-UP: Schedule a follow-up visit in 4 months.        COUNSELING: The patient was counseled concerning

## 2018-04-03 NOTE — TELEPHONE ENCOUNTER
Refill Protocol Appointment Criteria  · Appointment scheduled in the past 12 months or in the next 3 months  Recent Outpatient Visits            2 weeks ago Primary osteoarthritis of left knee    [address] Toby Joseph MD

## 2018-04-26 ENCOUNTER — TELEPHONE (OUTPATIENT)
Dept: FAMILY MEDICINE CLINIC | Facility: CLINIC | Age: 73
End: 2018-04-26

## 2018-04-26 ENCOUNTER — LAB ENCOUNTER (OUTPATIENT)
Dept: LAB | Facility: HOSPITAL | Age: 73
End: 2018-04-26
Attending: FAMILY MEDICINE
Payer: COMMERCIAL

## 2018-04-26 DIAGNOSIS — Z79.4 TYPE 2 DIABETES MELLITUS WITHOUT COMPLICATION, WITH LONG-TERM CURRENT USE OF INSULIN (HCC): ICD-10-CM

## 2018-04-26 DIAGNOSIS — E11.9 TYPE 2 DIABETES MELLITUS TREATED WITHOUT INSULIN (HCC): ICD-10-CM

## 2018-04-26 DIAGNOSIS — E11.9 TYPE 2 DIABETES MELLITUS WITHOUT COMPLICATION, WITH LONG-TERM CURRENT USE OF INSULIN (HCC): ICD-10-CM

## 2018-04-26 PROCEDURE — 36415 COLL VENOUS BLD VENIPUNCTURE: CPT

## 2018-04-26 PROCEDURE — 82570 ASSAY OF URINE CREATININE: CPT

## 2018-04-26 PROCEDURE — 82043 UR ALBUMIN QUANTITATIVE: CPT

## 2018-04-26 PROCEDURE — 83036 HEMOGLOBIN GLYCOSYLATED A1C: CPT

## 2018-04-26 NOTE — TELEPHONE ENCOUNTER
----- Message from Rodney Church MD sent at 4/26/2018 12:27 PM CDT -----  Please call patient, results are within normal limits.  Well control diabetes, CPM, KPA

## 2018-05-16 ENCOUNTER — LAB ENCOUNTER (OUTPATIENT)
Dept: LAB | Age: 73
End: 2018-05-16
Attending: FAMILY MEDICINE
Payer: COMMERCIAL

## 2018-05-16 ENCOUNTER — OFFICE VISIT (OUTPATIENT)
Dept: FAMILY MEDICINE CLINIC | Facility: CLINIC | Age: 73
End: 2018-05-16

## 2018-05-16 VITALS
TEMPERATURE: 98 F | BODY MASS INDEX: 30.9 KG/M2 | SYSTOLIC BLOOD PRESSURE: 151 MMHG | HEIGHT: 64 IN | DIASTOLIC BLOOD PRESSURE: 81 MMHG | WEIGHT: 181 LBS | HEART RATE: 77 BPM

## 2018-05-16 DIAGNOSIS — E11.42 TYPE 2 DIABETES MELLITUS WITH DIABETIC POLYNEUROPATHY, WITH LONG-TERM CURRENT USE OF INSULIN (HCC): ICD-10-CM

## 2018-05-16 DIAGNOSIS — M17.12 PRIMARY OSTEOARTHRITIS OF LEFT KNEE: ICD-10-CM

## 2018-05-16 DIAGNOSIS — Z79.4 TYPE 2 DIABETES MELLITUS WITH DIABETIC POLYNEUROPATHY, WITH LONG-TERM CURRENT USE OF INSULIN (HCC): Primary | ICD-10-CM

## 2018-05-16 DIAGNOSIS — E11.42 TYPE 2 DIABETES MELLITUS WITH DIABETIC POLYNEUROPATHY, WITH LONG-TERM CURRENT USE OF INSULIN (HCC): Primary | ICD-10-CM

## 2018-05-16 DIAGNOSIS — Z79.4 TYPE 2 DIABETES MELLITUS WITH DIABETIC POLYNEUROPATHY, WITH LONG-TERM CURRENT USE OF INSULIN (HCC): ICD-10-CM

## 2018-05-16 PROCEDURE — 99214 OFFICE O/P EST MOD 30 MIN: CPT | Performed by: FAMILY MEDICINE

## 2018-05-16 PROCEDURE — 82043 UR ALBUMIN QUANTITATIVE: CPT

## 2018-05-16 PROCEDURE — 36415 COLL VENOUS BLD VENIPUNCTURE: CPT

## 2018-05-16 PROCEDURE — 82570 ASSAY OF URINE CREATININE: CPT

## 2018-05-16 PROCEDURE — 83036 HEMOGLOBIN GLYCOSYLATED A1C: CPT

## 2018-05-16 PROCEDURE — 99212 OFFICE O/P EST SF 10 MIN: CPT | Performed by: FAMILY MEDICINE

## 2018-05-16 RX ORDER — L-METHYLFOLATE-ALGAE-VIT B12-B6 CAP 3-90.314-2-35 MG 3-90.314-2-35 MG
2 CAP ORAL DAILY
Qty: 180 CAPSULE | Refills: 1 | Status: SHIPPED | OUTPATIENT
Start: 2018-05-16 | End: 2018-06-15

## 2018-05-16 RX ORDER — GLIMEPIRIDE 4 MG/1
4 TABLET ORAL 2 TIMES DAILY
Qty: 180 TABLET | Refills: 2 | Status: SHIPPED | OUTPATIENT
Start: 2018-05-16 | End: 2019-06-27

## 2018-05-16 RX ORDER — MELOXICAM 15 MG/1
15 TABLET ORAL DAILY
Qty: 30 TABLET | Refills: 6 | Status: SHIPPED | OUTPATIENT
Start: 2018-05-16 | End: 2018-07-09

## 2018-05-16 NOTE — PROGRESS NOTES
5/16/2018  9:23 AM    Lizzie Matson is a 68year old female. Chief complaint(s): Patient presents with: Follow - Up  Diabetes  Osteoarthritis    HPI:     Lizzie Matson primary complaint is regarding as above.      Patient to be evaluated for [facility] [facility] Diagnosis Date   • Cataract    • Cholelithiasis    • Cold with flu    • Cup to disc asymmetry    • Diabetes mellitus ([facility] [address] 2011    Diabetes no retinopathy   • High blood pressure    • History of pregnancy      x3 (max 10 lbs), SAB x1 Cigarettes  Smokeless tobacco: Former User                     Comment: Quit 1990  Alcohol use:  No                 Immunizations:     Immunization History  Administered            Date(s) Administered    Fluad High dose 72 yr and older (83829) Review of Systems   Constitutional: Negative for chills, fatigue and fever. HENT: Negative for ear pain and sore throat. Dry mouth   Eyes: Negative for visual disturbance. Respiratory: Negative for cough and wheezing.     Cardiovascular: Negat 0.4 0.0 - 1.8 mg/dL   Malb/Cre Calc 6.7 0.0 - 20.0     EKG / Spirometry : -     Radiology: No results found. -    ASSESSMENT/PLAN:   Assessment   Type 2 diabetes mellitus with diabetic polyneuropathy, with long-term current use of insulin (hcc)  (primary e Prescriptions Disp Refills    Meloxicam 15 MG Oral Tab 30 tablet 6      Sig: Take 1 tablet (15 mg total) by mouth daily. glimepiride 4 MG Oral Tab 180 tablet 2      Sig: Take 1 tablet (4 mg total) by mouth 2 (two) times daily.       L-Methylfolate-Alga Visit:    Signed Prescriptions Disp Refills    Meloxicam 15 MG Oral Tab 30 tablet 6      Sig: Take 1 tablet (15 mg total) by mouth daily. glimepiride 4 MG Oral Tab 180 tablet 2      Sig: Take 1 tablet (4 mg total) by mouth 2 (two) times daily.       L-

## 2018-05-17 ENCOUNTER — TELEPHONE (OUTPATIENT)
Dept: FAMILY MEDICINE CLINIC | Facility: CLINIC | Age: 73
End: 2018-05-17

## 2018-05-17 NOTE — TELEPHONE ENCOUNTER
----- Message from Ronen Wiggins MD sent at 5/16/2018  8:21 PM CDT -----  Please call patient, the following results are within normal limits:  microalb and a1c, well control DM, CPM, KPA.

## 2018-05-17 NOTE — PROGRESS NOTES
Please call patient, the following results are within normal limits:  microalb and a1c, well control DM, CPM, KPA.

## 2018-07-05 ENCOUNTER — NURSE TRIAGE (OUTPATIENT)
Dept: OTHER | Age: 73
End: 2018-07-05

## 2018-07-05 NOTE — TELEPHONE ENCOUNTER
EBONIE Ernst pt will see you on MOnday. Pt was inform of  message below. Pt stated that she can not make at 2:30 she needs to be somewhere at 3:30. Pt requested a appt for Monday.  A Monday appt was given and advised her strongly to call us back if her s

## 2018-07-05 NOTE — TELEPHONE ENCOUNTER
Action Requested: Summary for Provider     []  Critical Lab, Recommendations Needed  [x] Need Additional Advice  []   FYI    []   Need Orders  [] Need Medications Sent to Pharmacy  []  Other     SUMMARY: Patient requesting appt with Dr. Richard Castaneda today and

## 2018-07-09 ENCOUNTER — HOSPITAL ENCOUNTER (OUTPATIENT)
Dept: GENERAL RADIOLOGY | Age: 73
Discharge: HOME | End: 2018-07-09
Attending: FAMILY MEDICINE
Payer: COMMERCIAL

## 2018-07-09 ENCOUNTER — OFFICE VISIT (OUTPATIENT)
Dept: FAMILY MEDICINE CLINIC | Facility: CLINIC | Age: 73
End: 2018-07-09

## 2018-07-09 VITALS
HEART RATE: 84 BPM | BODY MASS INDEX: 31 KG/M2 | DIASTOLIC BLOOD PRESSURE: 86 MMHG | SYSTOLIC BLOOD PRESSURE: 144 MMHG | TEMPERATURE: 99 F | WEIGHT: 179 LBS

## 2018-07-09 DIAGNOSIS — Z79.4 TYPE 2 DIABETES MELLITUS WITH DIABETIC POLYNEUROPATHY, WITH LONG-TERM CURRENT USE OF INSULIN (HCC): ICD-10-CM

## 2018-07-09 DIAGNOSIS — R35.89 POLYURIA: ICD-10-CM

## 2018-07-09 DIAGNOSIS — M15.9 PRIMARY OSTEOARTHRITIS INVOLVING MULTIPLE JOINTS: Primary | ICD-10-CM

## 2018-07-09 DIAGNOSIS — M15.9 PRIMARY OSTEOARTHRITIS INVOLVING MULTIPLE JOINTS: ICD-10-CM

## 2018-07-09 DIAGNOSIS — E11.42 TYPE 2 DIABETES MELLITUS WITH DIABETIC POLYNEUROPATHY, WITH LONG-TERM CURRENT USE OF INSULIN (HCC): ICD-10-CM

## 2018-07-09 LAB
APPEARANCE: CLEAR
BILIRUBIN: NEGATIVE
GLUCOSE (URINE DIPSTICK): 2000 MG/DL
KETONES (URINE DIPSTICK): NEGATIVE MG/DL
LEUKOCYTES: NEGATIVE
MULTISTIX LOT#: NORMAL NUMERIC
NITRITE, URINE: NEGATIVE
OCCULT BLOOD: NEGATIVE
PH, URINE: 5 (ref 4.5–8)
PROTEIN (URINE DIPSTICK): NEGATIVE MG/DL
SPECIFIC GRAVITY: 1 (ref 1–1.03)
URINE-COLOR: YELLOW
UROBILINOGEN,SEMI-QN: 0.2 MG/DL (ref 0–1.9)

## 2018-07-09 PROCEDURE — 99214 OFFICE O/P EST MOD 30 MIN: CPT | Performed by: FAMILY MEDICINE

## 2018-07-09 PROCEDURE — 73630 X-RAY EXAM OF FOOT: CPT | Performed by: FAMILY MEDICINE

## 2018-07-09 PROCEDURE — 99212 OFFICE O/P EST SF 10 MIN: CPT | Performed by: FAMILY MEDICINE

## 2018-07-09 PROCEDURE — 81003 URINALYSIS AUTO W/O SCOPE: CPT | Performed by: FAMILY MEDICINE

## 2018-07-09 RX ORDER — MELOXICAM 15 MG/1
15 TABLET ORAL DAILY
Qty: 30 TABLET | Refills: 6 | Status: ON HOLD | OUTPATIENT
Start: 2018-07-09 | End: 2018-08-12

## 2018-07-09 RX ORDER — LOSARTAN POTASSIUM 100 MG/1
100 TABLET ORAL DAILY
Qty: 90 TABLET | Refills: 4 | Status: ON HOLD | OUTPATIENT
Start: 2018-07-09 | End: 2018-08-12

## 2018-07-09 NOTE — PROGRESS NOTES
7/9/2018  1:33 PM    Prabuh Ferrell is a 68year old female. Chief complaint(s): Patient presents with:  Burning Feet: c/o pain and burning bilateral feet  Urinary Frequency    HPI:     Prabhu Ferrell primary complaint is regarding as above. CUFF REPAIR;  Surgeon: Sudheer Ardon MD;               Location: [facility] date: CHOLECYSTECTOMY  1/12/2017: COLONOSCOPY N/A      Comment: Procedure: COLONOSCOPY;  Surgeon: Alphonso Watson MD;  Location: [address] [place] [address] Oral Tab TAKE ONE TABLET BY MOUTH THREE TIMES DAILY WITH MEALS Disp: 270 tablet Rfl: 2   losartan 100 MG Oral Tab Take 1 tablet (100 mg total) by mouth daily. Disp: 90 tablet Rfl: 4   Meloxicam 15 MG Oral Tab Take 1 tablet (15 mg total) by mouth daily.  Dis External ear normal.   Left Ear: External ear normal.   Nose: No rhinorrhea. Mouth/Throat: Oropharynx is clear and moist.   Eyes: Conjunctivae are normal.   Neck: Neck supple. Cardiovascular: Normal rate and regular rhythm.     Pulmonary/Chest: Effort n Oral Tab 30 tablet 6      Sig: Take 1 tablet (15 mg total) by mouth daily.       Insulin Pen Needle (BD PEN NEEDLE TERENCE U/F) 32G X 4 MM Does not apply Misc 100 each 6      Sig: Use as directed daily               LABORATORY & ORDERS:   Orders Placed This En X 12.7MM Does not apply Misc, USE WITH INSULIN PEN AS DIRECTED ONCE DAILY, Disp: 100 each, Rfl: 11  •  aspirin EC 81 MG Oral Tab EC, Take 1 tablet by mouth daily. , Disp: 90 tablet, Rfl: 4  •  Hylan (SYNVISC ONE) injection 48 mg, 6 mL, Intra-articular, Once

## 2018-07-10 ENCOUNTER — TELEPHONE (OUTPATIENT)
Dept: FAMILY MEDICINE CLINIC | Facility: CLINIC | Age: 73
End: 2018-07-10

## 2018-07-10 NOTE — TELEPHONE ENCOUNTER
----- Message from Angelia Grande MD sent at 7/9/2018  8:20 PM CDT -----  Please call patient, the following results xr + DJD arthritis , CPM, KPA

## 2018-07-12 NOTE — TELEPHONE ENCOUNTER
[facility] pharmacy is calling needs clarification on qty, refills,and how many times pt needs to be testing.       Current Outpatient Prescriptions:  insulin detemir (LEVEMIR FLEXTOUCH) 100 UNIT/ML Subcutaneous Solution Pen-injector INJECT 25 UNITS UNDER T

## 2018-07-12 NOTE — TELEPHONE ENCOUNTER
Spoke with pharmacist, they needed clarification for frequency (advised twice daily) and disp/refills for both. Requested they fill up to 90 days for both.     Last Office Visit 7/9/18:  LIZ CONTOUR NEXT TEST In Vitro Strip, TEST TWICE DAILY, Disp: 100 st

## 2018-07-26 NOTE — TELEPHONE ENCOUNTER
With Greenlandic interpretor, advised patient of Dr. Hardin Cooks note. Patient verbalized understanding.

## 2018-08-07 ENCOUNTER — NURSE TRIAGE (OUTPATIENT)
Dept: FAMILY MEDICINE CLINIC | Facility: CLINIC | Age: 73
End: 2018-08-07

## 2018-08-07 NOTE — TELEPHONE ENCOUNTER
Action Requested: Summary for Provider     []  Critical Lab, Recommendations Needed  [] Need Additional Advice  []   FYI    []   Need Orders  [] Need Medications Sent to Pharmacy  []  Other     SUMMARY: Pt declines my calling an ambulance (per protocol); s

## 2018-08-08 NOTE — TELEPHONE ENCOUNTER
Patient reports was making arrangements yesterday to go but unable to, later on decided to stay home because her symptoms had resolved and she felt fine, denied having any further symptoms remainder of the day yesterday and today denied having any chest re

## 2018-08-08 NOTE — TELEPHONE ENCOUNTER
Patient has an appointment on Currently has appt scheduled for 9/19/18 with Dr Blaine Rincon. Do you want to see him sooner?

## 2018-08-09 NOTE — TELEPHONE ENCOUNTER
Reviewed doctor's recommendations with pt with Language Line assistance agent # [id number]. Pt agreed with plan of care and had no further questions at this time.

## 2018-08-10 ENCOUNTER — APPOINTMENT (OUTPATIENT)
Dept: GENERAL RADIOLOGY | Facility: HOSPITAL | Age: 73
DRG: 310 | End: 2018-08-10
Attending: EMERGENCY MEDICINE
Payer: COMMERCIAL

## 2018-08-10 ENCOUNTER — APPOINTMENT (OUTPATIENT)
Dept: CT IMAGING | Facility: HOSPITAL | Age: 73
DRG: 310 | End: 2018-08-10
Attending: EMERGENCY MEDICINE
Payer: COMMERCIAL

## 2018-08-10 ENCOUNTER — OFFICE VISIT (OUTPATIENT)
Dept: FAMILY MEDICINE CLINIC | Facility: CLINIC | Age: 73
End: 2018-08-10
Payer: COMMERCIAL

## 2018-08-10 ENCOUNTER — HOSPITAL ENCOUNTER (INPATIENT)
Facility: HOSPITAL | Age: 73
LOS: 2 days | Discharge: HOME | DRG: 310 | End: 2018-08-12
Attending: EMERGENCY MEDICINE | Admitting: HOSPITALIST
Payer: COMMERCIAL

## 2018-08-10 VITALS
DIASTOLIC BLOOD PRESSURE: 81 MMHG | SYSTOLIC BLOOD PRESSURE: 119 MMHG | WEIGHT: 184 LBS | BODY MASS INDEX: 32 KG/M2 | HEART RATE: 123 BPM | TEMPERATURE: 98 F

## 2018-08-10 DIAGNOSIS — I48.91 ATRIAL FIBRILLATION WITH RAPID VENTRICULAR RESPONSE (HCC): ICD-10-CM

## 2018-08-10 DIAGNOSIS — R07.9 CHEST PAIN, UNSPECIFIED TYPE: Primary | ICD-10-CM

## 2018-08-10 DIAGNOSIS — I48.91 ATRIAL FIBRILLATION, NEW ONSET (HCC): Primary | ICD-10-CM

## 2018-08-10 LAB
ANION GAP SERPL CALC-SCNC: 7 MMOL/L (ref 0–18)
BASOPHILS # BLD: 0.1 K/UL (ref 0–0.2)
BASOPHILS NFR BLD: 1 %
BUN SERPL-MCNC: 17 MG/DL (ref 8–20)
BUN/CREAT SERPL: 36.2 (ref 10–20)
CALCIUM SERPL-MCNC: 9.6 MG/DL (ref 8.5–10.5)
CHLORIDE SERPL-SCNC: 103 MMOL/L (ref 95–110)
CO2 SERPL-SCNC: 24 MMOL/L (ref 22–32)
CREAT SERPL-MCNC: 0.47 MG/DL (ref 0.5–1.5)
D DIMER PPP FEU-MCNC: 1.19 MCG/ML (ref ?–0.72)
EOSINOPHIL # BLD: 0.2 K/UL (ref 0–0.7)
EOSINOPHIL NFR BLD: 2 %
ERYTHROCYTE [DISTWIDTH] IN BLOOD BY AUTOMATED COUNT: 13.6 % (ref 11–15)
GLUCOSE BLDC GLUCOMTR-MCNC: 205 MG/DL (ref 70–99)
GLUCOSE BLDC GLUCOMTR-MCNC: 336 MG/DL (ref 70–99)
GLUCOSE SERPL-MCNC: 273 MG/DL (ref 70–99)
HCT VFR BLD AUTO: 38.3 % (ref 35–48)
HGB BLD-MCNC: 13 G/DL (ref 12–16)
LYMPHOCYTES # BLD: 1.9 K/UL (ref 1–4)
LYMPHOCYTES NFR BLD: 28 %
MCH RBC QN AUTO: 30.5 PG (ref 27–32)
MCHC RBC AUTO-ENTMCNC: 33.8 G/DL (ref 32–37)
MCV RBC AUTO: 90.2 FL (ref 80–100)
MONOCYTES # BLD: 0.5 K/UL (ref 0–1)
MONOCYTES NFR BLD: 8 %
NEUTROPHILS # BLD AUTO: 4 K/UL (ref 1.8–7.7)
NEUTROPHILS NFR BLD: 61 %
OSMOLALITY UR CALC.SUM OF ELEC: 289 MOSM/KG (ref 275–295)
PLATELET # BLD AUTO: 268 K/UL (ref 140–400)
PMV BLD AUTO: 9.9 FL (ref 7.4–10.3)
POTASSIUM SERPL-SCNC: 4.3 MMOL/L (ref 3.3–5.1)
RBC # BLD AUTO: 4.25 M/UL (ref 3.7–5.4)
SODIUM SERPL-SCNC: 134 MMOL/L (ref 136–144)
TROPONIN I SERPL-MCNC: 0.01 NG/ML (ref ?–0.03)
TROPONIN I SERPL-MCNC: 0.01 NG/ML (ref ?–0.03)
WBC # BLD AUTO: 6.7 K/UL (ref 4–11)

## 2018-08-10 PROCEDURE — 99212 OFFICE O/P EST SF 10 MIN: CPT | Performed by: FAMILY MEDICINE

## 2018-08-10 PROCEDURE — 71260 CT THORAX DX C+: CPT | Performed by: EMERGENCY MEDICINE

## 2018-08-10 PROCEDURE — 93000 ELECTROCARDIOGRAM COMPLETE: CPT | Performed by: FAMILY MEDICINE

## 2018-08-10 PROCEDURE — 93005 ELECTROCARDIOGRAM TRACING: CPT | Performed by: FAMILY MEDICINE

## 2018-08-10 PROCEDURE — 99214 OFFICE O/P EST MOD 30 MIN: CPT | Performed by: FAMILY MEDICINE

## 2018-08-10 PROCEDURE — 71045 X-RAY EXAM CHEST 1 VIEW: CPT | Performed by: EMERGENCY MEDICINE

## 2018-08-10 PROCEDURE — 99223 1ST HOSP IP/OBS HIGH 75: CPT | Performed by: HOSPITALIST

## 2018-08-10 RX ORDER — ONDANSETRON 2 MG/ML
4 INJECTION INTRAMUSCULAR; INTRAVENOUS EVERY 6 HOURS PRN
Status: DISCONTINUED | OUTPATIENT
Start: 2018-08-10 | End: 2018-08-12

## 2018-08-10 RX ORDER — ATORVASTATIN CALCIUM 10 MG/1
10 TABLET, FILM COATED ORAL ONCE
Status: COMPLETED | OUTPATIENT
Start: 2018-08-10 | End: 2018-08-10

## 2018-08-10 RX ORDER — ACETAMINOPHEN 325 MG/1
650 TABLET ORAL EVERY 6 HOURS PRN
Status: DISCONTINUED | OUTPATIENT
Start: 2018-08-10 | End: 2018-08-12

## 2018-08-10 RX ORDER — DEXTROSE MONOHYDRATE 25 G/50ML
50 INJECTION, SOLUTION INTRAVENOUS AS NEEDED
Status: DISCONTINUED | OUTPATIENT
Start: 2018-08-10 | End: 2018-08-12

## 2018-08-10 RX ORDER — ASPIRIN 325 MG
325 TABLET ORAL DAILY
Status: DISCONTINUED | OUTPATIENT
Start: 2018-08-10 | End: 2018-08-11

## 2018-08-10 RX ORDER — DILTIAZEM HYDROCHLORIDE 5 MG/ML
10 INJECTION INTRAVENOUS ONCE
Status: COMPLETED | OUTPATIENT
Start: 2018-08-10 | End: 2018-08-10

## 2018-08-10 RX ORDER — SODIUM CHLORIDE 0.9 % (FLUSH) 0.9 %
3 SYRINGE (ML) INJECTION AS NEEDED
Status: DISCONTINUED | OUTPATIENT
Start: 2018-08-10 | End: 2018-08-12

## 2018-08-10 RX ORDER — DILTIAZEM HYDROCHLORIDE 60 MG/1
60 TABLET, FILM COATED ORAL AS NEEDED
Status: DISCONTINUED | OUTPATIENT
Start: 2018-08-10 | End: 2018-08-12

## 2018-08-10 RX ORDER — NITROGLYCERIN 0.4 MG/1
0.4 TABLET SUBLINGUAL EVERY 5 MIN PRN
Status: DISCONTINUED | OUTPATIENT
Start: 2018-08-10 | End: 2018-08-12

## 2018-08-10 NOTE — TELEPHONE ENCOUNTER
Patient calling back with complaint of mid chest pain that is intermittent. Per patient, the chest pain returned last night. Patient advised to go to ER.    Patient refusing stating she was told if her chest pain comes back that Dr. Frankie Gonzalez will see

## 2018-08-10 NOTE — TELEPHONE ENCOUNTER
Patient went to office visit today 8/10/18 with Dr Morelia Aden, and was transferred to [address] [place] [address] ED for further evaluation.

## 2018-08-10 NOTE — CONSULTS
[facility] Heart Cardiology  Cardiology Consultation    Alie Larson Patient Status:  Emergency    1945 MRN [id number]   Location [address] Attending Kylee Dumont MD   [facility] with flu    • Cup to disc asymmetry    • Diabetes mellitus ([facility] [address] 2011    Diabetes no retinopathy   • High blood pressure    • History of pregnancy      x3 (max 10 lbs), SAB x1   • Hypercholesterolemia    • Lipid screening 2014   • Marko Fuentes Comment:Trees    Home Medications:    Medication Details Provider Last Reconciliation Status   insulin detemir (LEVEMIR FLEXTOUCH) 100 UNIT/ML Subcutaneous Solution Pen-injector INJECT 25 UNITS UNDER THE SKIN EVERY DAY Kush Ramos MD Needs Review sinusitis  Chest: no cough, no hemoptysis, chest painas noted above in cc  GI: no hematemesis, no melena  : no dysuria hematuria   Hematologic: no bleeding   Neurologic: no TIA-like symptoms  Skin: no rashes     Intake/Output:   No intake or output data KVNG Michelle  8/10/2018  5:01 PM

## 2018-08-10 NOTE — ED PROVIDER NOTES
Patient Seen in: [facility] Emergency Department    History   Patient presents with:  Chest Pain Angina (cardiovascular)    Stated Complaint: chest pain    HPI    67-year-old female with history of hypertension, hyperlipidemia, diabetes, sent here Right      Comment: Arthroscopy  1995: OTHER SURGICAL HISTORY      Comment: Open cholecystectomy  5/27/2015: April Sharp Right      Comment: Procedure: ARTHROSCOPY SHOULDER WITH ROTATOR                CUFF REPAIR;  Surgeon: Sukhjinder Olguin appears well-developed and well-nourished. No distress. HENT:   Head: Normocephalic and atraumatic. Mouth/Throat: Oropharynx is clear and moist.   Eyes: EOM are normal. Pupils are equal, round, and reactive to light. Neck: Normal range of motion.    C Hold Lt Green Auto Resulted    -RAINBOW DRAW LIGHT GREEN   Collection Time: 08/10/18  2:55 PM   Result Value Ref Range   Hold Lt Green Auto Resulted    -RAINBOW DRAW GOLD   Collection Time: 08/10/18  2:55 PM   Result Value Ref Range   Hold Gold Auto Resu perihilar atelectasis or scar. 2. No evidence of pneumonia. 3. Mild cardiomegaly. No acute CHF.      Dictated by (CST): Kim Ralph MD on 8/10/2018 at 15:26     Approved by (CST): Kim Ralph MD on 8/10/2018 at 15:27              Nae Solomon [facility]) on her problem list. to contribute to the complexity of his ED evaluation.       EMERGENCY DEPARTMENT MEDICAL DECISION MAKING:  After obtaining the patient's history, performing the physical exam and reviewing the diagnostics, multiple initial diagnos

## 2018-08-10 NOTE — ED INITIAL ASSESSMENT (HPI)
Pt came in for substernal CP for 3 days. HR in 140s upon arrival. RR even and nonlabored, denies SOB. No hx of A. Fib. Speaking in full sentences, ambulatory with steady gait.

## 2018-08-10 NOTE — H&P
[facility]    PATIENT'S NAME: Linda Jenkins   ATTENDING PHYSICIAN: Marcie Robin MD   PATIENT ACCOUNT#:   [id number]    LOCATION:  [address]  MEDICAL RECORD #:   [id number]       YOB: 1945  ADMISSION DATE:       08/10/ time, place, and person. No acute distress. VITAL SIGNS:  Temperature 98.2. Pulse 138 upon arrival, now 102 on IV Cardizem drip. Respiratory rate 22, blood pressure 160/109, pulse ox 95% on room air. HEENT:  Atraumatic. Oropharynx clear.   Moist muc

## 2018-08-10 NOTE — TELEPHONE ENCOUNTER
Spoke to Dr. Jah Rodriguez. Per Dr. Jah Rodriguez he is recommending ER. Per Dr. Jah Rodriguez, patient can be seen in office if she can make it to [place] by 12:30 p.m. Patient states she is able to make it to office at 12:30 p.m.    Patient stated she is leaving

## 2018-08-10 NOTE — PROGRESS NOTES
8/10/2018  1:13 PM    Alie Larson is a 68year old female.     Chief complaint(s): Patient presents with:  Chest Pain: fatigue, chest pain that radiates to upper back, shortness of breath x4 days    HPI:     Alie Larson primary complaint is re Comment: Procedure: COLONOSCOPY;  Surgeon: Clarice Patton MD;  Location: [facility] ENDOSCOPY  4/21/2014: ELECTROCARDIOGRAM, COMPLETE      Comment: Scanned to media tab  2014: HYSTERECTOMY      Comment: TLH/BSO/ A&P repair/ uteroscral spine fixa Disp: 90 tablet Rfl: 4   Meloxicam 15 MG Oral Tab Take 1 tablet (15 mg total) by mouth daily.  Disp: 30 tablet Rfl: 6   Insulin Pen Needle (BD PEN NEEDLE TERENCE U/F) 32G X 4 MM Does not apply Misc Use as directed daily Disp: 100 each Rfl: 6   glimepiride 4 MG Neck supple. Cardiovascular: An irregular rhythm present. Tachycardia present. Pulmonary/Chest: Effort normal and breath sounds normal. She has no wheezes. She has no rales. Lymphadenopathy:     She has no cervical adenopathy. Skin: No rash noted. CONTOUR NEXT TEST In Vitro Strip, TEST TWICE DAILY, Disp: 100 strip, Rfl: 2  •  Lancets Does not apply Misc, Check blood sugar 2 times daily, Disp: 200 each, Rfl: 3  •  pregabalin (LYRICA) 75 MG Oral Cap, Take 1 capsule (75 mg total) by mouth 2 (two) times

## 2018-08-11 ENCOUNTER — APPOINTMENT (OUTPATIENT)
Dept: NUCLEAR MEDICINE | Facility: HOSPITAL | Age: 73
DRG: 310 | End: 2018-08-11
Attending: HOSPITALIST
Payer: COMMERCIAL

## 2018-08-11 ENCOUNTER — APPOINTMENT (OUTPATIENT)
Dept: CV DIAGNOSTICS | Facility: HOSPITAL | Age: 73
DRG: 310 | End: 2018-08-11
Attending: HOSPITALIST
Payer: COMMERCIAL

## 2018-08-11 LAB
ANION GAP SERPL CALC-SCNC: 5 MMOL/L (ref 0–18)
BASOPHILS # BLD: 0.1 K/UL (ref 0–0.2)
BASOPHILS NFR BLD: 1 %
BUN SERPL-MCNC: 11 MG/DL (ref 8–20)
BUN/CREAT SERPL: 22.4 (ref 10–20)
CALCIUM SERPL-MCNC: 9.5 MG/DL (ref 8.5–10.5)
CHLORIDE SERPL-SCNC: 106 MMOL/L (ref 95–110)
CHOLEST SERPL-MCNC: 134 MG/DL (ref 110–200)
CO2 SERPL-SCNC: 27 MMOL/L (ref 22–32)
CREAT SERPL-MCNC: 0.49 MG/DL (ref 0.5–1.5)
EOSINOPHIL # BLD: 0.2 K/UL (ref 0–0.7)
EOSINOPHIL NFR BLD: 4 %
ERYTHROCYTE [DISTWIDTH] IN BLOOD BY AUTOMATED COUNT: 13.5 % (ref 11–15)
GLUCOSE BLDC GLUCOMTR-MCNC: 209 MG/DL (ref 70–99)
GLUCOSE BLDC GLUCOMTR-MCNC: 212 MG/DL (ref 70–99)
GLUCOSE BLDC GLUCOMTR-MCNC: 229 MG/DL (ref 70–99)
GLUCOSE BLDC GLUCOMTR-MCNC: 328 MG/DL (ref 70–99)
GLUCOSE SERPL-MCNC: 229 MG/DL (ref 70–99)
HBA1C MFR BLD: 10 % (ref 4–6)
HCT VFR BLD AUTO: 35.9 % (ref 35–48)
HDLC SERPL-MCNC: 41 MG/DL
HGB BLD-MCNC: 12.2 G/DL (ref 12–16)
LDLC SERPL CALC-MCNC: 80 MG/DL (ref 0–99)
LYMPHOCYTES # BLD: 2 K/UL (ref 1–4)
LYMPHOCYTES NFR BLD: 34 %
MAGNESIUM SERPL-MCNC: 1.9 MG/DL (ref 1.8–2.5)
MCH RBC QN AUTO: 30.4 PG (ref 27–32)
MCHC RBC AUTO-ENTMCNC: 33.9 G/DL (ref 32–37)
MCV RBC AUTO: 89.8 FL (ref 80–100)
MONOCYTES # BLD: 0.5 K/UL (ref 0–1)
MONOCYTES NFR BLD: 9 %
NEUTROPHILS # BLD AUTO: 3 K/UL (ref 1.8–7.7)
NEUTROPHILS NFR BLD: 52 %
NONHDLC SERPL-MCNC: 93 MG/DL
OSMOLALITY UR CALC.SUM OF ELEC: 293 MOSM/KG (ref 275–295)
PLATELET # BLD AUTO: 255 K/UL (ref 140–400)
PMV BLD AUTO: 9.9 FL (ref 7.4–10.3)
POTASSIUM SERPL-SCNC: 4.1 MMOL/L (ref 3.3–5.1)
RBC # BLD AUTO: 4 M/UL (ref 3.7–5.4)
SODIUM SERPL-SCNC: 138 MMOL/L (ref 136–144)
TRIGL SERPL-MCNC: 66 MG/DL (ref 1–149)
TSH SERPL-ACNC: 1.89 UIU/ML (ref 0.45–5.33)
WBC # BLD AUTO: 5.9 K/UL (ref 4–11)

## 2018-08-11 PROCEDURE — 99233 SBSQ HOSP IP/OBS HIGH 50: CPT | Performed by: HOSPITALIST

## 2018-08-11 PROCEDURE — 93306 TTE W/DOPPLER COMPLETE: CPT | Performed by: HOSPITALIST

## 2018-08-11 PROCEDURE — B246ZZZ ULTRASONOGRAPHY OF RIGHT AND LEFT HEART: ICD-10-PCS | Performed by: INTERNAL MEDICINE

## 2018-08-11 PROCEDURE — 4A02XM4 MEASUREMENT OF CARDIAC TOTAL ACTIVITY, EXTERNAL APPROACH: ICD-10-PCS | Performed by: RADIOLOGY

## 2018-08-11 PROCEDURE — 3E033HZ INTRODUCTION OF RADIOACTIVE SUBSTANCE INTO PERIPHERAL VEIN, PERCUTANEOUS APPROACH: ICD-10-PCS | Performed by: RADIOLOGY

## 2018-08-11 PROCEDURE — 78452 HT MUSCLE IMAGE SPECT MULT: CPT | Performed by: HOSPITALIST

## 2018-08-11 PROCEDURE — 93017 CV STRESS TEST TRACING ONLY: CPT | Performed by: HOSPITALIST

## 2018-08-11 RX ORDER — DILTIAZEM HYDROCHLORIDE 60 MG/1
120 TABLET, FILM COATED ORAL DAILY
Status: DISCONTINUED | OUTPATIENT
Start: 2018-08-11 | End: 2018-08-12

## 2018-08-11 RX ORDER — DILTIAZEM HYDROCHLORIDE 60 MG/1
120 TABLET, FILM COATED ORAL DAILY
Status: DISCONTINUED | OUTPATIENT
Start: 2018-08-11 | End: 2018-08-11

## 2018-08-11 RX ORDER — MAGNESIUM OXIDE 400 MG (241.3 MG MAGNESIUM) TABLET
1 TABLET NIGHTLY PRN
Status: DISCONTINUED | OUTPATIENT
Start: 2018-08-11 | End: 2018-08-12

## 2018-08-11 RX ORDER — ASPIRIN 81 MG/1
81 TABLET ORAL DAILY
Status: DISCONTINUED | OUTPATIENT
Start: 2018-08-12 | End: 2018-08-12

## 2018-08-11 RX ORDER — PREGABALIN 75 MG/1
75 CAPSULE ORAL 2 TIMES DAILY
Status: DISCONTINUED | OUTPATIENT
Start: 2018-08-11 | End: 2018-08-12

## 2018-08-11 RX ORDER — 0.9 % SODIUM CHLORIDE 0.9 %
VIAL (ML) INJECTION
Status: COMPLETED
Start: 2018-08-11 | End: 2018-08-11

## 2018-08-11 NOTE — PROGRESS NOTES
[facility] Heart Cardiology Progress Note      Erin Duffy Patient Status:  Inpatient    1945 MRN [id number]   Location [facility] 3W/SW Attending Maya Tomlin MD   Hosp Day # 1 PCP Dylon Serrano MD Void Urine Occurrence  2 x          Wt Readings from Last 6 Encounters:  08/11/18 : 182 lb (82.6 kg)  08/10/18 : 184 lb (83.5 kg)  07/09/18 : 179 lb (81.2 kg)  05/16/18 : 181 lb (82.1 kg)  03/14/18 : 181 lb (82.1 kg)  02/21/18 : 180 lb (81.6 kg)       E Ramona Ivory MD on 8/10/2018 at 15:26     Approved by (CST): Vivi Miles MD on 8/10/2018 at 15:27          Ct Chest Pain/pe (iv Only) Em    Result Date: 8/10/2018  CONCLUSION:  1. No acute pulmonary embolus to the subsegmental pulmonary artery level.   No ac

## 2018-08-11 NOTE — PLAN OF CARE
Problem: Patient Centered Care  Goal: Patient preferences are identified and integrated in the patient's plan of care  Interventions:  - What would you like us to know as we care for you?  Home with family.- Provide timely, complete, and accurate informatio threatening arrhythmias  - Monitor electrolytes and administer replacement therapy as ordered  Outcome: Progressing  A fib- cardizem drip  continues at 5 ml/hr.     Problem: PAIN - ADULT  Goal: Verbalizes/displays adequate comfort level or patient's stated

## 2018-08-11 NOTE — TELEPHONE ENCOUNTER
Pt is now admitted at time of this note, to [address] [place] [address] for new onset of A-fib. Closing encounter.

## 2018-08-11 NOTE — PROGRESS NOTES
[facility]    Progress Note    Kira Martínez Patient Status:  Inpatient    1945 MRN [id number]   Location [facility] 3W/SW Attending Saritha Humphreys MD   Hosp Day # 1 PCP Silvana Amaral MD       Subjective:     No whitney pleural effusions, larger on the right. Intralobular septal thickening within the lung bases. Findings suggest CHF and/or increase fluid volume status of the patient.  3.  Pulmonary artery enlargement which along with cardiomegaly suggests pulmonary arter

## 2018-08-12 VITALS
SYSTOLIC BLOOD PRESSURE: 123 MMHG | DIASTOLIC BLOOD PRESSURE: 86 MMHG | RESPIRATION RATE: 16 BRPM | OXYGEN SATURATION: 96 % | WEIGHT: 178.5 LBS | BODY MASS INDEX: 29.74 KG/M2 | HEART RATE: 86 BPM | HEIGHT: 65 IN | TEMPERATURE: 98 F

## 2018-08-12 LAB
ANION GAP SERPL CALC-SCNC: 5 MMOL/L (ref 0–18)
BASOPHILS # BLD: 0.1 K/UL (ref 0–0.2)
BASOPHILS NFR BLD: 1 %
BUN SERPL-MCNC: 12 MG/DL (ref 8–20)
BUN/CREAT SERPL: 23.5 (ref 10–20)
CALCIUM SERPL-MCNC: 9.5 MG/DL (ref 8.5–10.5)
CHLORIDE SERPL-SCNC: 105 MMOL/L (ref 95–110)
CO2 SERPL-SCNC: 27 MMOL/L (ref 22–32)
CREAT SERPL-MCNC: 0.51 MG/DL (ref 0.5–1.5)
EOSINOPHIL # BLD: 0.2 K/UL (ref 0–0.7)
EOSINOPHIL NFR BLD: 3 %
ERYTHROCYTE [DISTWIDTH] IN BLOOD BY AUTOMATED COUNT: 13.3 % (ref 11–15)
GLUCOSE BLDC GLUCOMTR-MCNC: 251 MG/DL (ref 70–99)
GLUCOSE SERPL-MCNC: 235 MG/DL (ref 70–99)
HCT VFR BLD AUTO: 36.5 % (ref 35–48)
HGB BLD-MCNC: 12.6 G/DL (ref 12–16)
LYMPHOCYTES # BLD: 1.7 K/UL (ref 1–4)
LYMPHOCYTES NFR BLD: 29 %
MCH RBC QN AUTO: 31 PG (ref 27–32)
MCHC RBC AUTO-ENTMCNC: 34.5 G/DL (ref 32–37)
MCV RBC AUTO: 89.9 FL (ref 80–100)
MONOCYTES # BLD: 0.6 K/UL (ref 0–1)
MONOCYTES NFR BLD: 10 %
NEUTROPHILS # BLD AUTO: 3.2 K/UL (ref 1.8–7.7)
NEUTROPHILS NFR BLD: 56 %
OSMOLALITY UR CALC.SUM OF ELEC: 291 MOSM/KG (ref 275–295)
PLATELET # BLD AUTO: 240 K/UL (ref 140–400)
PMV BLD AUTO: 9.5 FL (ref 7.4–10.3)
POTASSIUM SERPL-SCNC: 3.9 MMOL/L (ref 3.3–5.1)
RBC # BLD AUTO: 4.06 M/UL (ref 3.7–5.4)
SODIUM SERPL-SCNC: 137 MMOL/L (ref 136–144)
WBC # BLD AUTO: 5.7 K/UL (ref 4–11)

## 2018-08-12 PROCEDURE — 99239 HOSP IP/OBS DSCHRG MGMT >30: CPT | Performed by: HOSPITALIST

## 2018-08-12 RX ORDER — DILTIAZEM HYDROCHLORIDE 120 MG/1
120 TABLET, FILM COATED ORAL DAILY
Qty: 30 TABLET | Refills: 5 | Status: SHIPPED | OUTPATIENT
Start: 2018-08-13 | End: 2019-01-11

## 2018-08-12 NOTE — PROGRESS NOTES
[facility]    Progress Note    Jeffery Teran Patient Status:  Inpatient    1945 MRN [id number]   Location [facility] 3W/SW Attending Binta Drake MD   Hosp Day # 2 PCP Lynn Bloom MD        Subjective:     Res 05/05/2015   PT 11.7 (L) 05/05/2015   TSH 1.89 08/11/2018   LIP 26 11/08/2016   DDIMER 1.19 (H) 08/10/2018   MG 1.9 08/11/2018   TROP 0.01 08/10/2018       Xr Chest Ap Portable  (cpt=71045)    Result Date: 8/10/2018  CONCLUSION:  1.  Minimal linear right gr fibrillation conduction ABNORMAL RHYTHM When compared with ECG of 05/05/2015 11:39:51 Atrial fibrillation is new Electronically signed on 08/10/2018 at 15:13 by MD Blayne Alfonso APN  8/12/2018

## 2018-08-12 NOTE — TRANSITION NOTE
History of Present Illness:  Naldo Bell is a a(n) 68year old female who presents with history of HTN, DM, Hyperlipidemia. She was sent her by her PCP whom she saw this afternoon, office EKG showed atrial fibrillation.   She reports 4/10 chest d 32G X 4 MM Misc  Commonly known as:  BD PEN NEEDLE TERENCE U/F      Use as directed daily   Quantity:  100 each  Refills:  6     Lancets Misc      Check blood sugar 2 times daily   Quantity:  200 each  Refills:  3        STOP taking these medications    korina

## 2018-08-13 ENCOUNTER — TELEPHONE (OUTPATIENT)
Dept: CARDIOLOGY UNIT | Facility: HOSPITAL | Age: 73
End: 2018-08-13

## 2018-08-13 ENCOUNTER — PATIENT OUTREACH (OUTPATIENT)
Dept: CASE MANAGEMENT | Age: 73
End: 2018-08-13

## 2018-08-13 NOTE — PROGRESS NOTES
TCM OUTREACH    Call made to attempt to reach patient for TCM follow up. No answer, Voicemail left  in [place] and [place] requesting call back at [phone].     Book by date: 8/26/18    (Triage Team if pt returns call please transfer to ext [phone])

## 2018-08-13 NOTE — DISCHARGE SUMMARY
[place] [facility]    Discharge Summary    Erin Duffy Patient Status:  Inpatient    1945 MRN [id number]   Location [facility] 3W/SW Attending No att. providers found   [facility] Day [facility] PCP Dylon Serrano MD     Date of Admiss D-dimer 1. 19. CT scan of the chest still pending. Chest x-ray showed no acute findings. CBC negative. The patient was started on IV Cardizem drip and she will be admitted to the hospital for further management.      Hospital Course:     Pt was admitted Misc  Commonly known as:  BD PEN NEEDLE TERENCE U/F      Use as directed daily   Quantity:  100 each  Refills:  6     glimepiride 4 MG Tabs  Commonly known as:  AMARYL      Take 1 tablet (4 mg total) by mouth 2 (two) times daily.    Quantity:  180 tablet  Refi Risk of readmission after discharge from the hospital.      >35 minutes spent preparing this discharge.     Freddie Zhou  8/13/2018  9:04 AM

## 2018-08-14 NOTE — PROGRESS NOTES
Initial Post Discharge Follow Up   Discharge Date: 8/12/18  Contact Date: 8/13/2018    Consent Verification:  Assessment Completed With: Patient  HIPAA Verified?   Yes    Discharge Dx:  Atrial fibrillation    General:   • How have you been since your disc CONTOUR NEXT TEST In Vitro Strip TEST TWICE DAILY Disp: 100 strip Rfl: 2   Lancets Does not apply Misc Check blood sugar 2 times daily Disp: 200 each Rfl: 3   pregabalin (LYRICA) 75 MG Oral Cap Take 1 capsule (75 mg total) by mouth 2 (two) times daily.  Dis [address] [place] [address] [address] [id number]  [facility] [place] [id number]          PCP TCM/HFU appointment: scheduled at D/C

## 2018-08-15 ENCOUNTER — TELEPHONE (OUTPATIENT)
Dept: CARDIOLOGY UNIT | Facility: HOSPITAL | Age: 73
End: 2018-08-15

## 2018-08-15 NOTE — PAYOR COMM NOTE
--------------  ADMISSION REVIEW     Payor: [employer]  Subscriber #:  [id number]  Authorization Number: [id number]    Admit date: 8/10/18  Admit time: 1849   DISCHARGED 8/12      Admitting Physician: Samira Nuñez MD  Attending Physician: No date: ARTHROSCOPY, SHOULDER, SURGI      Comment: 2015 rotator cufff repain  5/27/2015: ARTHROSCOPY, SHOULDER, SURGICAL; W/ROTATOR CUF* Right      Comment: Procedure: ARTHROSCOPY SHOULDER WITH ROTATOR                CUFF REPAIR;  Surgeon: Jada Smiley Other systems are as noted in HPI. Constitutional and vital signs reviewed. All other systems reviewed and negative except as noted above.     Physical Exam   ED Triage Vitals  BP: (!) 160/109 [08/10/18 1450]  Pulse: (!) 138 [08/10/18 1450]  Resp: 16 Patient placed on the cardiac monitor and a rhythm strip obtained with the indication of afib.   Monitor shows afib at a rate of 136 bpm.     My interpretation is   abnormal for rate   abnormal for rhythm    Pulse Oximeter:  Pulse oximetry on room air is 95 MCH 30.5 27.0 - 32.0 pg   MCHC 33.8 32.0 - 37.0 g/dl   RDW 13.6 11.0 - 15.0 %    140 - 400 K/UL   MPV 9.9 7.4 - 10.3 fL   Neutrophil % 61 %   Lymphocyte % 28 %   Monocyte % 8 %   Eosinophil % 2 %   Basophil % 1 %   Neutrophil Absolute 4.0 1.8 - 7.7 The patient was informed of their elevated blood pressure reading in the Emergency Department. They were informed of the dangers of undiagnosed and untreated hypertension.   Education regarding lifestyle modifications and the need for appropriate follow-up We recommend that you schedule follow up care with a primary care provider within the next three months to obtain basic health screening including reassessment of your blood pressure.     Medications Prescribed:  Current Discharge Medication List FAMILY HISTORY:  Mother had diabetes, brother had gastric cancer. SOCIAL HISTORY:  No current tobacco, alcohol, or drug use. Lives with her family and is usually independent with basic activities of daily living. Active lifestyle.      REVIEW OF SYSTE t: 08/10/2018 18:26:58  Job [id number]  WR/    Electronically signed by Ozzie Grant MD on 8/13/2018  6:00 PM         Consults signed by Ian Bruno MD at 8/10/2018  5:31 PM     Author: Ian Bruno MD Service: (none) Author Typ May need outpatient sleep study to rule out apnea     3.)  HTN             Cont home dose of Losartan 100 mg qd             Running a bit high, add low dose beta blocker Metoprolol tartrate 12.5mg po BID             Monitor     4.)  DM 2014:  HYSTERECTOMY      Comment: TLH/BSO/ A&P repair/ uteroscral spine fixation  2012: OTHER SURGICAL HISTORY Right      Comment: Arthroscopy  1995: OTHER SURGICAL HISTORY      Comment: Open cholecystectomy  5/27/2015: [facility] pregabalin (LYRICA) 75 MG Oral Cap Take 1 capsule (75 mg total) by mouth 2 (two) times daily.  Tere Gaspar MD Needs Review   BD PEN NEEDLE ULTRAFINE 29G X 12.7MM Does not apply Misc USE WITH INSULIN PEN AS DIRECTED ONCE DAILY Suzie Santamaria DO Ne HGB 13.0 08/10/2018   HCT 38.3 08/10/2018    08/10/2018   CREATSERUM 0.47 08/10/2018   BUN 17 08/10/2018    08/10/2018   K 4.3 08/10/2018    08/10/2018   CO2 24 08/10/2018    08/10/2018   CA 9.6 08/10/2018   DDIMER 1.19 08/10/2018 Component Value Date   WBC 5.9 08/11/2018   HGB 12.2 08/11/2018   HCT 35.9 08/11/2018    08/11/2018   CREATSERUM 0.49 (L) 08/11/2018   BUN 11 08/11/2018    08/11/2018   K 4.1 08/11/2018    08/11/2018   CO2 27 08/11/2018    (H) 08/ CONCLUSION:  1. No acute pulmonary embolus to the subsegmental pulmonary artery level. No acute aortic injury; no dissection. 2.  Cardiomegaly. Pulmonary artery enlargement.   Reflux of injected contrast material within the azygos vein and hepatic IVC an - ISS     Hx of HTN  BP controlled.   - hold losartan  - cont metoprolol and dilt gtt     dvt proph:   Xarelto     Code status:   Full     >35 minutes spent      Freddie Bolaños MD  8/11/2018         Discharge Summary Notes     Discharge Summaries signed b Abd:   +bs, soft, NT, ND  LE:  No c/c/e  Neuro:  nonfocal        Reason for Admission:     Atrial fibrillation with rapid ventricular response.      HISTORY OF PRESENT ILLNESS:  The patient is a 78-year-old  female who has been having chest tightne   Take 1 tablet (20 mg total) by mouth daily with food.    Quantity:  30 tablet  Refills:  11                      CONTINUE taking these medications      Instructions Prescription details   aspirin EC 81 MG Tbec       Take 1 tablet by mouth daily.    Quant Specialty:  Family Medicine  Why:  Patient requested to make your own Diabetic follow up appointment. Please call within 2 days from discharge for a 2 week follow up.

## 2018-08-17 ENCOUNTER — OFFICE VISIT (OUTPATIENT)
Dept: CARDIOLOGY CLINIC | Facility: HOSPITAL | Age: 73
End: 2018-08-17
Attending: INTERNAL MEDICINE
Payer: COMMERCIAL

## 2018-08-17 VITALS
HEART RATE: 59 BPM | BODY MASS INDEX: 30 KG/M2 | DIASTOLIC BLOOD PRESSURE: 65 MMHG | WEIGHT: 182.5 LBS | SYSTOLIC BLOOD PRESSURE: 127 MMHG | RESPIRATION RATE: 18 BRPM | OXYGEN SATURATION: 94 %

## 2018-08-17 DIAGNOSIS — I48.91 ATRIAL FIBRILLATION (HCC): Primary | ICD-10-CM

## 2018-08-17 DIAGNOSIS — I48.21 PERMANENT ATRIAL FIBRILLATION (HCC): ICD-10-CM

## 2018-08-17 PROCEDURE — 99214 OFFICE O/P EST MOD 30 MIN: CPT | Performed by: CLINICAL NURSE SPECIALIST

## 2018-08-17 PROCEDURE — 93005 ELECTROCARDIOGRAM TRACING: CPT

## 2018-08-17 PROCEDURE — 93010 ELECTROCARDIOGRAM REPORT: CPT | Performed by: CLINICAL NURSE SPECIALIST

## 2018-08-17 PROCEDURE — 99211 OFF/OP EST MAY X REQ PHY/QHP: CPT | Performed by: CLINICAL NURSE SPECIALIST

## 2018-08-17 NOTE — PATIENT INSTRUCTIONS
Continue current medications    Please talk with Dr Andriy Mcnally about obtaining a prior authorization for Xarelto    Please contact your insurance company to inquire about alternative anticoagulation (Eliquis or Coumadin)     Schedule hospital follow up with

## 2018-08-17 NOTE — PROGRESS NOTES
[address] Patient Status:  Outpatient    1945 MRN [id number]   Location MD Dr Mary Beth Gallegos is a 68year old female who presents to clin 251 (H) 08/12/2018 07:32 AM       Clinical labs drawn by MA: NA    /65   Pulse 59   Resp 18   Wt 182 lb 8 oz (82.8 kg)   SpO2 94%   BMI 30.37 kg/m²     Clinic weights: 1) 182    General appearance: alert, appears stated age and cooperative  Neck: no hospitalization for new onset atrial fibrillation. HR irregular in clinic today, 88 bmp, . Tolerating cardizem and metoprolol well. Vss. No signs of bleeding on Xarelto. Concerned for cost of Xarelto; was given a free card for one month.  Antony Soriano

## 2018-08-23 ENCOUNTER — OFFICE VISIT (OUTPATIENT)
Dept: FAMILY MEDICINE CLINIC | Facility: CLINIC | Age: 73
End: 2018-08-23
Payer: COMMERCIAL

## 2018-08-23 VITALS
BODY MASS INDEX: 31.18 KG/M2 | DIASTOLIC BLOOD PRESSURE: 65 MMHG | WEIGHT: 182.63 LBS | HEART RATE: 81 BPM | TEMPERATURE: 98 F | HEIGHT: 64 IN | SYSTOLIC BLOOD PRESSURE: 106 MMHG

## 2018-08-23 DIAGNOSIS — I48.21 PERMANENT ATRIAL FIBRILLATION (HCC): Primary | ICD-10-CM

## 2018-08-23 PROCEDURE — 99495 TRANSJ CARE MGMT MOD F2F 14D: CPT | Performed by: FAMILY MEDICINE

## 2018-08-23 NOTE — PROGRESS NOTES
8/23/2018  3:24 PM    Romel Gunter is a 68year old female. Chief complaint(s): Patient presents with:  TCM (Transition Of Care Management): Patient here to f/u from the ER. HPI:     Romel Gunter primary complaint is regarding A fib. and/or Mortality: moderate    Overall Risk:   moderate    Patient seen within 7 days from date of discharge. Patient is a 23-year-old female who recently was hospitalized due to chest pain and found to have any arrhythmia.   Further workup in the hospita TLH/BSO/ A&P repair/ uteroscral spine fixation  2012: OTHER SURGICAL HISTORY Right      Comment: Arthroscopy  1995: OTHER SURGICAL HISTORY      Comment: Open cholecystectomy  5/27/2015: Robe Ruano Right      Comment: Procedure: Melani Mae DAILY WITH MEALS Disp: 270 tablet Rfl: 2   Insulin Pen Needle (BD PEN NEEDLE TERENCE U/F) 32G X 4 MM Does not apply Misc Use as directed daily Disp: 100 each Rfl: 6   glimepiride 4 MG Oral Tab Take 1 tablet (4 mg total) by mouth 2 (two) times daily.  Disp: 180 Effort normal and breath sounds normal. She has no wheezes. She has no rales. Lymphadenopathy:     She has no cervical adenopathy. LEs no edema    Skin: No rash noted.        LABORATORY RESULTS:   No results found for: Cait Baker Range   Magnesium 1.9 1.8 - 2.5 mg/dL   -BASIC METABOLIC PANEL (8)   Result Value Ref Range   Glucose 235 (H) 70 - 99 mg/dL   Sodium 137 136 - 144 mmol/L   Potassium 3.9 3.3 - 5.1 mmol/L   Chloride 105 95 - 110 mmol/L   CO2 27 22 - 32 mmol/L   BUN 12 8 - 2 K/UL   Lymphocyte Absolute 1.9 1.0 - 4.0 K/UL   Monocyte Absolute 0.5 0.0 - 1.0 K/UL   Eosinophil Absolute 0.2 0.0 - 0.7 K/UL   Basophil Absolute 0.1 0.0 - 0.2 K/UL   -CBC W/ DIFFERENTIAL   Result Value Ref Range   WBC 5.9 4.0 - 11.0 K/UL   RBC 4.00 3.70 - effusion. BONES: No fracture or visible bony lesion. OTHER: Negative. CONCLUSION:  1. Minimal linear right greater the left perihilar atelectasis or scar. 2. No evidence of pneumonia. 3. Mild cardiomegaly. No acute CHF.      Dictated by (CST): Isabella azygos vein and within to the hepatic veins and hepatic segment of the IVC. CHEST WALL: No axillary or clavicular lymphadenopathy. LIMITED ABDOMEN: Small hiatal hernia. There is reflux of injected contrast into the hepatic IVC and hepatic veins.   12 mm d tablet (20 mg total) by mouth daily with food. , Disp: 30 tablet, Rfl: 11  •  metoprolol Tartrate 25 MG Oral Tab, Take 1 tablet (25 mg total) by mouth 2x Daily(Beta Blocker). , Disp: 30 tablet, Rfl: 5  •  DilTIAZem HCl 120 MG Oral Tab, Take 1 tablet (120 mg ordered in this encounter    Imaging & Referrals:  None         Rachel Osorio MD

## 2018-10-09 ENCOUNTER — HOSPITAL ENCOUNTER (OUTPATIENT)
Dept: GENERAL RADIOLOGY | Facility: HOSPITAL | Age: 73
Discharge: HOME | End: 2018-10-09
Attending: ORTHOPAEDIC SURGERY
Payer: COMMERCIAL

## 2018-10-09 ENCOUNTER — OFFICE VISIT (OUTPATIENT)
Dept: ORTHOPEDICS CLINIC | Facility: CLINIC | Age: 73
End: 2018-10-09
Payer: COMMERCIAL

## 2018-10-09 DIAGNOSIS — M25.562 LEFT KNEE PAIN, UNSPECIFIED CHRONICITY: ICD-10-CM

## 2018-10-09 DIAGNOSIS — M17.12 PRIMARY OSTEOARTHRITIS OF LEFT KNEE: Primary | ICD-10-CM

## 2018-10-09 PROCEDURE — 99212 OFFICE O/P EST SF 10 MIN: CPT | Performed by: ORTHOPAEDIC SURGERY

## 2018-10-09 PROCEDURE — 99214 OFFICE O/P EST MOD 30 MIN: CPT | Performed by: ORTHOPAEDIC SURGERY

## 2018-10-09 PROCEDURE — 73564 X-RAY EXAM KNEE 4 OR MORE: CPT | Performed by: ORTHOPAEDIC SURGERY

## 2018-10-09 NOTE — PROGRESS NOTES
10/9/2018  Verenice Salamanca  2/18/1945  68year old   female  Froylan Riley MD    HPI:   Patient presents with:  Knee Pain: Left - here with her daughter who translates for her - onset long time ago - has pain in her knee rated as 9/10 on and off, patellofemoral compartment joint space narrowing. There are multiple intra-articular joint bodies.      ASSESSMENT AND PLAN:   Primary osteoarthritis of left knee  (primary encounter diagnosis)  Left knee pain, unspecified chronicity    This is a pleasant 8

## 2018-10-18 ENCOUNTER — OFFICE VISIT (OUTPATIENT)
Dept: NEUROLOGY | Facility: CLINIC | Age: 73
End: 2018-10-18
Payer: MEDICARE

## 2018-10-18 ENCOUNTER — TELEPHONE (OUTPATIENT)
Dept: NEUROLOGY | Facility: CLINIC | Age: 73
End: 2018-10-18

## 2018-10-18 ENCOUNTER — HOSPITAL ENCOUNTER (OUTPATIENT)
Dept: GENERAL RADIOLOGY | Facility: HOSPITAL | Age: 73
Discharge: HOME | End: 2018-10-18
Attending: PHYSICAL MEDICINE & REHABILITATION
Payer: COMMERCIAL

## 2018-10-18 VITALS
SYSTOLIC BLOOD PRESSURE: 152 MMHG | DIASTOLIC BLOOD PRESSURE: 94 MMHG | HEART RATE: 84 BPM | HEIGHT: 65 IN | WEIGHT: 184 LBS | BODY MASS INDEX: 30.66 KG/M2

## 2018-10-18 DIAGNOSIS — M54.50 CHRONIC BILATERAL LOW BACK PAIN WITHOUT SCIATICA: ICD-10-CM

## 2018-10-18 DIAGNOSIS — M17.0 BILATERAL PRIMARY OSTEOARTHRITIS OF KNEE: ICD-10-CM

## 2018-10-18 DIAGNOSIS — M47.816 FACET ARTHROPATHY, LUMBAR: ICD-10-CM

## 2018-10-18 DIAGNOSIS — M54.50 CHRONIC BILATERAL LOW BACK PAIN WITHOUT SCIATICA: Primary | ICD-10-CM

## 2018-10-18 DIAGNOSIS — G89.29 CHRONIC BILATERAL LOW BACK PAIN WITHOUT SCIATICA: ICD-10-CM

## 2018-10-18 DIAGNOSIS — G89.29 CHRONIC BILATERAL LOW BACK PAIN WITHOUT SCIATICA: Primary | ICD-10-CM

## 2018-10-18 PROCEDURE — 72110 X-RAY EXAM L-2 SPINE 4/>VWS: CPT | Performed by: PHYSICAL MEDICINE & REHABILITATION

## 2018-10-18 PROCEDURE — 99204 OFFICE O/P NEW MOD 45 MIN: CPT | Performed by: PHYSICAL MEDICINE & REHABILITATION

## 2018-10-18 RX ORDER — NAPROXEN 500 MG/1
500 TABLET ORAL 2 TIMES DAILY WITH MEALS
Qty: 60 TABLET | Refills: 0 | Status: SHIPPED | OUTPATIENT
Start: 2018-10-18 | End: 2018-12-13

## 2018-10-18 NOTE — TELEPHONE ENCOUNTER
Pt attending f/u visit on 11/29 with Dr. Leigh Hendricks. Depending on pt's progress from PT, pt may need approved bilateral knee CSIs. TBD at f/u.

## 2018-10-18 NOTE — TELEPHONE ENCOUNTER
[employer] for insurance coverage of  Left & Right knee CSI under U/S cpt codes 31581, 40493,. insurance was verified and procedure is a covered benefit and does not require authorization. Will inform Nursing.

## 2018-10-18 NOTE — H&P
[address] H&P    Requesting Physician: Dr. Sagar Palomo  Chief Complaint (Reason for Visit):  Patient presents with:  Back Pain: Referred by Dr Michael Ashley pt is here for consult on mid back pain down to B 2009   • Cholelithiasis    • Cold with flu    • Cup to disc asymmetry    • Diabetes mellitus ([address] 2011    Diabetes no retinopathy   • High blood pressure    • History of pregnancy      x3 (max 10 lbs), SAB x1   • Hypercholesterolemia    • Sangeeta Roberts Occupational History      Not on file    Tobacco Use      Smoking status: Former Smoker        Types: Cigarettes      Smokeless tobacco: Former User      Tobacco comment: Quit 1990    Substance and Sexual Activity      Alcohol use: No      Drug use:  No Musculoskeletal: Bilateral knee pain and low back pain  Skin: Negative. Neurological: radiating Pain going down the legs  Endo/Heme/Allergies: Negative. Psychiatric/Behavioral: Negative. All other systems reviewed and are negative.  Pertinent p extremity.   Sensation: Intact to light touch in all dermatomes of the lower extremities  Reflexes: 1/4 at L4 and S1  Facet Loading: Pain during extension and rotation ipsilaterally on both sides  ZOË: negative for contralateral SI joint pain and ipsilate Final   • Creatinine 08/10/2018 0.47* 0.50 - 1.50 mg/dL Final   • Calcium, Total 08/10/2018 9.6  8.5 - 10.5 mg/dL Final   • BUN/CREA Ratio 08/10/2018 36.2* 10.0 - 20.0 Final   • Anion Gap 08/10/2018 7  0 - 18 mmol/L Final   • Calculated Osmolality 08/10/20 70 - 99 mg/dL Final   • Sodium 08/11/2018 138  136 - 144 mmol/L Final   • Potassium 08/11/2018 4.1  3.3 - 5.1 mmol/L Final   • Chloride 08/11/2018 106  95 - 110 mmol/L Final   • CO2 08/11/2018 27  22 - 32 mmol/L Final   • BUN 08/11/2018 11  8 - 20 mg/dL Fi • Sodium 08/12/2018 137  136 - 144 mmol/L Final   • Potassium 08/12/2018 3.9  3.3 - 5.1 mmol/L Final   • Chloride 08/12/2018 105  95 - 110 mmol/L Final   • CO2 08/12/2018 27  22 - 32 mmol/L Final   • BUN 08/12/2018 12  8 - 20 mg/dL Final   • Creatinine 0 OCCULT BLOOD 07/09/2018 negative  Negative Final   • PH, URINE 07/09/2018 5.0  4.5 - 8.0 Final   • PROTEIN (URINE DIPSTICK) 07/09/2018 negative  Negative/Trace mg/dL Final   • UROBILINOGEN,SEMI-QN 07/09/2018 0.2  0.0 - 1.9 mg/dL Final   • NITRITE, URINE 07 change within the patella and trochlea. Calcification is seen along the distal quadriceps tendon consistent with chronic tendinosis. SOFT TISSUES: Negative. No visible soft tissue swelling.     EFFUSION: Small suprapatellar joint effusion with multipl her again in 6 weeks after starting physical therapy. We had a long discussion on injectables including prolotherapy, corticosteroid injections, and hyaluronic acid injections.   In 6 weeks and she will decide whether she wants to do a corticosteroid injec

## 2018-10-19 ENCOUNTER — TELEPHONE (OUTPATIENT)
Dept: NEUROLOGY | Facility: CLINIC | Age: 73
End: 2018-10-19

## 2018-10-19 NOTE — TELEPHONE ENCOUNTER
Called pateint to review X-ray's. No answer. VM left. Will try again on Monday. Edith Hurt.  Tanya Henriquez MD  Physical Medicine and Rehabilitation/Sports Medicine  [facility]

## 2018-10-22 ENCOUNTER — OFFICE VISIT (OUTPATIENT)
Dept: PHYSICAL THERAPY | Facility: HOSPITAL | Age: 73
End: 2018-10-22
Attending: PHYSICAL MEDICINE & REHABILITATION
Payer: COMMERCIAL

## 2018-10-22 DIAGNOSIS — M54.50 CHRONIC BILATERAL LOW BACK PAIN WITHOUT SCIATICA: ICD-10-CM

## 2018-10-22 DIAGNOSIS — G89.29 CHRONIC BILATERAL LOW BACK PAIN WITHOUT SCIATICA: ICD-10-CM

## 2018-10-22 DIAGNOSIS — M47.816 FACET ARTHROPATHY, LUMBAR: ICD-10-CM

## 2018-10-22 DIAGNOSIS — M17.0 BILATERAL PRIMARY OSTEOARTHRITIS OF KNEE: ICD-10-CM

## 2018-10-22 PROCEDURE — 97162 PT EVAL MOD COMPLEX 30 MIN: CPT

## 2018-10-22 PROCEDURE — 97110 THERAPEUTIC EXERCISES: CPT

## 2018-10-22 NOTE — PROGRESS NOTES
POST-OP KNEE AND LUMBAR EVALUATION:   Referring Physician: Dr. Tanya Henriquez  Diagnosis: M17.0 (ICD-10-CM) - 715.16 (ICD-9-CM) - Bilateral primary osteoarthritis of knee  M47.816 (ICD-10-CM) - 721.3 (ICD-9-CM) - Facet arthropathy, lumbar  M54.5, G89.29 (ICD-10-C In agreement with FOTO score and clinical rationale, this evaluation involved Moderate Complexity decision making due to 3+ personal factors/comorbidities, 4+ body structures involved/activity limitations, and evolving symptoms including changing pain leve Charges: PT Eval, x1, Therap.  Ex x 1, Total Timed Treatment: 15  min Total Treatment Time: 45 min         PLAN OF CARE:    Goals:  8-10 visits  · Pt will improve knee extension ROM to 0 deg to allow proper heel strike during gait and terminal knee extensio Patient/Family was advised of these findings, precautions, and treatment options and has agreed to actively participate in planning and for this course of care.     Thank you for your referral. Please co-sign or sign and return this letter via fax as soon a

## 2018-10-25 ENCOUNTER — OFFICE VISIT (OUTPATIENT)
Dept: PHYSICAL THERAPY | Facility: HOSPITAL | Age: 73
End: 2018-10-25
Attending: PHYSICAL MEDICINE & REHABILITATION
Payer: COMMERCIAL

## 2018-10-25 PROCEDURE — 97110 THERAPEUTIC EXERCISES: CPT

## 2018-10-25 NOTE — PROGRESS NOTES
Diagnosis: M17.0 (ICD-10-CM) - 715.16 (ICD-9-CM) - Bilateral primary osteoarthritis of knee  M47.816 (ICD-10-CM) - 721.3 (ICD-9-CM) - Facet arthropathy, lumbar  M54.5, G89.29 (ICD-10-CM) - 724.2, 338.29 (ICD-9-CM) - Chronic bilateral low back pain without independence with gait on uneven surfaces such as grass   · To improve TUG score down to 12 secs or less to assist in gait safety  · Patient will see a reduction in max pain from initial 8/10 down to to 4/10 or lower to allow for improved sleep tolerance

## 2018-10-26 ENCOUNTER — OFFICE VISIT (OUTPATIENT)
Dept: CARDIOLOGY CLINIC | Facility: CLINIC | Age: 73
End: 2018-10-26
Payer: COMMERCIAL

## 2018-10-26 ENCOUNTER — TELEPHONE (OUTPATIENT)
Dept: CARDIOLOGY CLINIC | Facility: CLINIC | Age: 73
End: 2018-10-26

## 2018-10-26 VITALS
BODY MASS INDEX: 30.49 KG/M2 | HEART RATE: 82 BPM | WEIGHT: 183 LBS | HEIGHT: 65 IN | DIASTOLIC BLOOD PRESSURE: 82 MMHG | RESPIRATION RATE: 18 BRPM | SYSTOLIC BLOOD PRESSURE: 140 MMHG

## 2018-10-26 DIAGNOSIS — I48.91 ATRIAL FIBRILLATION WITH RAPID VENTRICULAR RESPONSE (HCC): Primary | ICD-10-CM

## 2018-10-26 PROCEDURE — 99214 OFFICE O/P EST MOD 30 MIN: CPT | Performed by: NURSE PRACTITIONER

## 2018-10-26 PROCEDURE — 99212 OFFICE O/P EST SF 10 MIN: CPT | Performed by: NURSE PRACTITIONER

## 2018-10-26 PROCEDURE — 1111F DSCHRG MED/CURRENT MED MERGE: CPT | Performed by: NURSE PRACTITIONER

## 2018-10-26 NOTE — TELEPHONE ENCOUNTER
Spoke to Yo Mishra from [facility] initiated for xarelto 20 mg. PA approved case # [id number]. Start date of 9/26/18 expiration; 10/26/19. Pts daughter notified in office today. No further action required at this time.

## 2018-10-26 NOTE — PROGRESS NOTES
Vedia Romberg is a 68year old female. Patient presents with:  Hospital F/U: hospitalized on 8/10/18 d/t A. Fib. states feels palpitations randomly x 2months. States palpitation improved until about two days ago felt palpitations return on exertion. Oral Tab Take 1 tablet (4 mg total) by mouth 2 (two) times daily.  Disp: 180 tablet Rfl: 2   LIZ CONTOUR NEXT TEST In Vitro Strip TEST TWICE DAILY Disp: 100 strip Rfl: 2   Lancets Does not apply Misc Check blood sugar 2 times daily Disp: 200 each Rfl: 3 no apparent distress  SKIN: no rashes,no suspicious lesions  HEENT: atraumatic, normocephalic,ears and throat are clear  NECK: supple,no adenopathy,no bruits  LUNGS: clear to auscultation  CARDIO: Irregular rhythm  GI: good BS's,no masses, HSM or tendernes

## 2018-10-26 NOTE — PATIENT INSTRUCTIONS
1. Increase metoprolol to 50mg daily (take 2 of the 25mg pills)  2. Check BP at home  3. Follow up in 3 weeks with Dr. Hammad Villa  4.  Restart Xarelto 20mg

## 2018-10-29 ENCOUNTER — OFFICE VISIT (OUTPATIENT)
Dept: PHYSICAL THERAPY | Facility: HOSPITAL | Age: 73
End: 2018-10-29
Attending: PHYSICAL MEDICINE & REHABILITATION
Payer: COMMERCIAL

## 2018-10-29 ENCOUNTER — MED REC SCAN ONLY (OUTPATIENT)
Dept: CARDIOLOGY CLINIC | Facility: CLINIC | Age: 73
End: 2018-10-29

## 2018-10-29 PROCEDURE — 97110 THERAPEUTIC EXERCISES: CPT

## 2018-10-29 NOTE — PROGRESS NOTES
Diagnosis: M17.0 (ICD-10-CM) - 715.16 (ICD-9-CM) - Bilateral primary osteoarthritis of knee  M47.816 (ICD-10-CM) - 721.3 (ICD-9-CM) - Facet arthropathy, lumbar  M54.5, G89.29 (ICD-10-CM) - 724.2, 338.29 (ICD-9-CM) - Chronic bilateral low back pain without assist   · Pt will increase hip and knee strength to grossly 4+/5 to be able to get object up and down from the floor safely  · Pt will demonstrate increased hip ER/ABD strength to 4+/5 to perform stepping and squatting activities without excessive femoral

## 2018-10-31 ENCOUNTER — OFFICE VISIT (OUTPATIENT)
Dept: PHYSICAL THERAPY | Facility: HOSPITAL | Age: 73
End: 2018-10-31
Attending: PHYSICAL MEDICINE & REHABILITATION
Payer: COMMERCIAL

## 2018-10-31 PROCEDURE — 97110 THERAPEUTIC EXERCISES: CPT

## 2018-10-31 NOTE — PROGRESS NOTES
Diagnosis: M17.0 (ICD-10-CM) - 715.16 (ICD-9-CM) - Bilateral primary osteoarthritis of knee  M47.816 (ICD-10-CM) - 721.3 (ICD-9-CM) - Facet arthropathy, lumbar  M54.5, G89.29 (ICD-10-CM) - 724.2, 338.29 (ICD-9-CM) - Chronic bilateral low back pain without · Pt will improve knee extension ROM to 0 deg to allow proper heel strike during gait and terminal knee extension in stance  · Pt will improve knee AROM flexion to >135 degrees to improve ability to perform proper lifts from floor to waist, and sit to kamran

## 2018-11-05 ENCOUNTER — OFFICE VISIT (OUTPATIENT)
Dept: PHYSICAL THERAPY | Facility: HOSPITAL | Age: 73
End: 2018-11-05
Attending: PHYSICAL MEDICINE & REHABILITATION
Payer: COMMERCIAL

## 2018-11-05 PROCEDURE — 97110 THERAPEUTIC EXERCISES: CPT

## 2018-11-05 NOTE — PROGRESS NOTES
Diagnosis: M17.0 (ICD-10-CM) - 715.16 (ICD-9-CM) - Bilateral primary osteoarthritis of knee  M47.816 (ICD-10-CM) - 721.3 (ICD-9-CM) - Facet arthropathy, lumbar  M54.5, G89.29 (ICD-10-CM) - 724.2, 338.29 (ICD-9-CM) - Chronic bilateral low back pain without HEP: Updated in [place]   HEP: HEP: eliminated SLR for now, and bridge exercise, added HEP for SAQ and hip abduction, clams as below        Assessment:Pt reports no lumbar pain post session.    L knee pain persists and pt is not able to tolerate resistance

## 2018-11-07 ENCOUNTER — OFFICE VISIT (OUTPATIENT)
Dept: PHYSICAL THERAPY | Facility: HOSPITAL | Age: 73
End: 2018-11-07
Attending: PHYSICAL MEDICINE & REHABILITATION
Payer: COMMERCIAL

## 2018-11-07 PROCEDURE — 97110 THERAPEUTIC EXERCISES: CPT

## 2018-11-07 NOTE — PROGRESS NOTES
Diagnosis: M17.0 (ICD-10-CM) - 715.16 (ICD-9-CM) - Bilateral primary osteoarthritis of knee  M47.816 (ICD-10-CM) - 721.3 (ICD-9-CM) - Facet arthropathy, lumbar  M54.5, G89.29 (ICD-10-CM) - 724.2, 338.29 (ICD-9-CM) - Chronic bilateral low back pain without 10\" hold x 10 each: L 1#,  R 3#           HEP: HEP: eliminated SLR for now, and bridge exercise, added HEP for SAQ and hip abduction, clams as below  HEP:  SLS in corner at home       Assessment:Overall, pt reporting 50% improvement since beginning therap

## 2018-11-12 ENCOUNTER — OFFICE VISIT (OUTPATIENT)
Dept: PHYSICAL THERAPY | Facility: HOSPITAL | Age: 73
End: 2018-11-12
Attending: PHYSICAL MEDICINE & REHABILITATION
Payer: COMMERCIAL

## 2018-11-12 PROCEDURE — 97110 THERAPEUTIC EXERCISES: CPT

## 2018-11-12 NOTE — PROGRESS NOTES
Diagnosis: M17.0 (ICD-10-CM) - 715.16 (ICD-9-CM) - Bilateral primary osteoarthritis of knee  M47.816 (ICD-10-CM) - 721.3 (ICD-9-CM) - Facet arthropathy, lumbar  M54.5, G89.29 (ICD-10-CM) - 724.2, 338.29 (ICD-9-CM) - Chronic bilateral low back pain without PF: R: 4-/  L 4-  Big toe ext: R: 4+ L: 4+      Strength/MMT:                 Current:  Current   Hip Knee   Flexion: R 4-/5; L 4+/5  Abduction: R 4+/5; L 4+/5  ER: R 4+/5; L 4/5 Flexion: R 4+/5; L 4+/5  Extension: R 4+/5; L 4-/5 pain   ankle DF: R 5-/5  L · Pt will be independent and compliant with comprehensive HEP to maintain progress achieved in PT      MET      Education or treatment limitation: Communication  Rehab Potential: good  FOTO: 63/100    Predicted 61/100  Current status G Code: Progress/Goal TKE's 10\" hold x 10 each: L 1#,  R 3#   Ihsan Bream.  Ex:   LTR; 20 x 5 secs  SAQ: 3# lefft and 1.5# right  hooklying clams: GTB 2x 10  Sidelying clams: 2 x 10  Heel slides:supine  2 x 10  TA bracing with hip abduction  Pilates ring: adduction 10 x with pain at

## 2018-11-15 ENCOUNTER — TELEPHONE (OUTPATIENT)
Dept: PHYSICAL THERAPY | Facility: HOSPITAL | Age: 73
End: 2018-11-15

## 2018-11-15 ENCOUNTER — APPOINTMENT (OUTPATIENT)
Dept: PHYSICAL THERAPY | Facility: HOSPITAL | Age: 73
End: 2018-11-15
Attending: PHYSICAL MEDICINE & REHABILITATION
Payer: COMMERCIAL

## 2018-11-27 ENCOUNTER — OFFICE VISIT (OUTPATIENT)
Dept: CARDIOLOGY CLINIC | Facility: CLINIC | Age: 73
End: 2018-11-27
Payer: COMMERCIAL

## 2018-11-27 VITALS
BODY MASS INDEX: 29.82 KG/M2 | WEIGHT: 179 LBS | HEART RATE: 89 BPM | HEIGHT: 65 IN | RESPIRATION RATE: 18 BRPM | SYSTOLIC BLOOD PRESSURE: 129 MMHG | DIASTOLIC BLOOD PRESSURE: 70 MMHG

## 2018-11-27 DIAGNOSIS — I48.91 ATRIAL FIBRILLATION WITH RAPID VENTRICULAR RESPONSE (HCC): Primary | ICD-10-CM

## 2018-11-27 DIAGNOSIS — I34.0 NON-RHEUMATIC MITRAL REGURGITATION: ICD-10-CM

## 2018-11-27 PROCEDURE — 99214 OFFICE O/P EST MOD 30 MIN: CPT | Performed by: INTERNAL MEDICINE

## 2018-11-27 PROCEDURE — 99212 OFFICE O/P EST SF 10 MIN: CPT | Performed by: INTERNAL MEDICINE

## 2018-11-27 NOTE — PROGRESS NOTES
Pedro Clements is a 68year old female. Patient presents with:   Follow - Up  Atrial Fibrillation  Palpitations: rare    HPI:   This is a pleasant 80-year-old with history of atrial fibrillation hypertension and mitral regurgitation with prior chest pain Disp: 100 strip Rfl: 2   Lancets Does not apply Misc Check blood sugar 2 times daily Disp: 200 each Rfl: 3   BD PEN NEEDLE ULTRAFINE 29G X 12.7MM Does not apply Misc USE WITH INSULIN PEN AS DIRECTED ONCE DAILY Disp: 100 each Rfl: 11      Past Medical Histo oriented x3    Assessment   ASSESSMENT AND PLAN:     Problem List Items Addressed This Visit     Atrial fibrillation with rapid ventricular response ([address] - Primary    Non-rheumatic mitral regurgitation    Relevant Orders    CARD ECHO 2D DOPPLER (CPT=93306

## 2018-11-27 NOTE — PATIENT INSTRUCTIONS
Monitor and record blood pressure and pulse for 2 weeks and call with results.   If feeling heart racing or more fatigued also check your vitals and call us with  Results  Echocardiogram in 6 months just prior to follow-up visit

## 2018-11-29 ENCOUNTER — OFFICE VISIT (OUTPATIENT)
Dept: NEUROLOGY | Facility: CLINIC | Age: 73
End: 2018-11-29
Payer: MEDICARE

## 2018-11-29 ENCOUNTER — TELEPHONE (OUTPATIENT)
Dept: NEUROLOGY | Facility: CLINIC | Age: 73
End: 2018-11-29

## 2018-11-29 VITALS
WEIGHT: 190 LBS | HEART RATE: 80 BPM | RESPIRATION RATE: 16 BRPM | BODY MASS INDEX: 32.44 KG/M2 | SYSTOLIC BLOOD PRESSURE: 120 MMHG | DIASTOLIC BLOOD PRESSURE: 80 MMHG | HEIGHT: 64 IN

## 2018-11-29 DIAGNOSIS — M48.062 LUMBAR STENOSIS WITH NEUROGENIC CLAUDICATION: ICD-10-CM

## 2018-11-29 DIAGNOSIS — M22.2X9 PATELLOFEMORAL PAIN SYNDROME, UNSPECIFIED LATERALITY: ICD-10-CM

## 2018-11-29 DIAGNOSIS — M17.12 OSTEOARTHRITIS OF LEFT KNEE, UNSPECIFIED OSTEOARTHRITIS TYPE: Primary | ICD-10-CM

## 2018-11-29 DIAGNOSIS — G89.29 CHRONIC BILATERAL LOW BACK PAIN WITHOUT SCIATICA: ICD-10-CM

## 2018-11-29 DIAGNOSIS — M54.50 CHRONIC BILATERAL LOW BACK PAIN WITHOUT SCIATICA: ICD-10-CM

## 2018-11-29 DIAGNOSIS — M17.0 PRIMARY OSTEOARTHRITIS OF BOTH KNEES: ICD-10-CM

## 2018-11-29 PROCEDURE — 99214 OFFICE O/P EST MOD 30 MIN: CPT | Performed by: PHYSICAL MEDICINE & REHABILITATION

## 2018-11-29 PROCEDURE — 20611 DRAIN/INJ JOINT/BURSA W/US: CPT | Performed by: PHYSICAL MEDICINE & REHABILITATION

## 2018-11-29 RX ORDER — LIDOCAINE HYDROCHLORIDE 10 MG/ML
7 INJECTION, SOLUTION INFILTRATION; PERINEURAL ONCE
Status: COMPLETED | OUTPATIENT
Start: 2018-11-29 | End: 2018-11-29

## 2018-11-29 RX ORDER — TRIAMCINOLONE ACETONIDE 40 MG/ML
40 INJECTION, SUSPENSION INTRA-ARTICULAR; INTRAMUSCULAR ONCE
Status: COMPLETED | OUTPATIENT
Start: 2018-11-29 | End: 2018-11-29

## 2018-11-29 NOTE — PROCEDURES
[address]   Knee Joint Injection Procedure Note    CHIEF COMPLAINT:  Patient presents with:  Low Back Pain: Patient here for follow up. LOV 10/18/2018.  Patient states per daughter() mid low back p Calculated Osmolality 08/10/2018 289  275 - 295 mOsm/kg Final   • GFR, Non- 08/10/2018 >60  >=60 Final   • GFR, -American 08/10/2018 >60  >=60 Final   • Troponin 08/10/2018 0.01  <=0.03 ng/mL Final   • WBC 08/10/2018 6.7  4.0 - 11.0 08/11/2018 11  8 - 20 mg/dL Final   • Creatinine 08/11/2018 0.49* 0.50 - 1.50 mg/dL Final   • Calcium, Total 08/11/2018 9.5  8.5 - 10.5 mg/dL Final   • BUN/CREA Ratio 08/11/2018 22.4* 10.0 - 20.0 Final   • Anion Gap 08/11/2018 5  0 - 18 mmol/L Final   • Ca mg/dL Final   • Creatinine 08/12/2018 0.51  0.50 - 1.50 mg/dL Final   • Calcium, Total 08/12/2018 9.5  8.5 - 10.5 mg/dL Final   • BUN/CREA Ratio 08/12/2018 23.5* 10.0 - 20.0 Final   • Anion Gap 08/12/2018 5  0 - 18 mmol/L Final   • Calculated Osmolality 08 Final   • NITRITE, URINE 07/09/2018 negative  Negative Final   • LEUKOCYTES 07/09/2018 negative  Negative Final   • APPEARANCE 07/09/2018 clear  Clear Final   • URINE-COLOR 07/09/2018 yellow  Yellow Final   • Multistix Lot# 07/09/2018 [id number]  Garrett eMza directly to the emergency room for further evaluation\"        Orestes Tello.  Lonny Thurman MD, [facility]  Physical Medicine and Rehabilitation/Sports Medicine  [address] [facility]

## 2018-11-29 NOTE — PATIENT INSTRUCTIONS
Post Injection Instructions     1. Please do not do anything strenuous over the next two days (if you had a knee injection do not walk more than 2 city blocks, do not attend any aerobic classes, do not run, no heavy lifting, no prolong standing).   2. You m

## 2018-11-29 NOTE — PROGRESS NOTES
[address]  Progress Note    CHIEF COMPLAINT:  Patient presents with:  Low Back Pain: Patient here for follow up. LOV 10/18/2018.  Patient states per daughter() mid low back pain is intermittent loc retinopathy   • High blood pressure    • History of pregnancy      x3 (max 10 lbs), SAB x1   • Hypercholesterolemia    • Lipid screening 2014   • Meniscus tear     Foreign body, meniscus tear   • Osteoporosis screening 2013    Dexa Scan Solution Pen-injector INJECT 25 UNITS UNDER THE SKIN EVERY DAY Disp: 90 mL Rfl: 2   MetFORMIN HCl 850 MG Oral Tab TAKE ONE TABLET BY MOUTH THREE TIMES DAILY WITH MEALS (Patient taking differently: Take 850 mg by mouth 2 (two) times daily with meals.  TAKE O kg/m²     Body mass index is 32.61 kg/m². General: No immediate distress  Head: Normocephalic/ Atraumatic  Eyes: Extra-occular movements intact.    Ears: No auricular hematoma or deformities  Mouth: No lesions or ulcerations  Heart: peripheral pulses i pain        KNEE:  Inspection: no erythema, swelling, or obvious deformity  Palpation: ttp over medial joint line, lateral joint line, pes anserine bursa, bilateral medial or lateral patella facets.    ROM: Full active ROM in flexion and extension with crep 08/10/2018 4.25  3.70 - 5.40 M/UL Final   • HGB 08/10/2018 13.0  12.0 - 16.0 g/dL Final   • HCT 08/10/2018 38.3  35.0 - 48.0 % Final   • MCV 08/10/2018 90.2  80.0 - 100.0 fL Final   • MCH 08/10/2018 30.5  27.0 - 32.0 pg Final   • MCHC 08/10/2018 33.8  32. 0 Osmolality 08/11/2018 293  275 - 295 mOsm/kg Final   • GFR, Non- 08/11/2018 >60  >=60 Final   • GFR, -American 08/11/2018 >60  >=60 Final   • TSH 08/11/2018 1.89  0.45 - 5.33 uIU/mL Final   • Magnesium 08/11/2018 1.9  1.8 - 2.5 mg/dL 291  275 - 295 mOsm/kg Final   • GFR, Non- 08/12/2018 >60  >=60 Final   • GFR, -American 08/12/2018 >60  >=60 Final   • WBC 08/12/2018 5.7  4.0 - 11.0 K/UL Final   • RBC 08/12/2018 4.06  3.70 - 5.40 M/UL Final   • HGB 08/12/2018 12. 6 Multistix Expiration Date 07/09/2018 04/30/2019  Date Final   ]      Radiology Imaging:  I reviewed with the patient her X-ray of the lumbar spine and knees bilateral from 10/18/18  XR LUMBAR SPINE (MIN 4 VIEWS) (CPT=72110)  Narrative: PROCEDURE: XR LUMBAR EM     COMPARISON: [facility] 2nd Floor, X KNEES ORTHO SERIES LIT, 3/10/2015, 17:12. INDICATIONS:  Left knee pain for years, no trauma. TECHNIQUE:     4 views were obtained.        FINDINGS:          BONES:             Mild spine as I am concerned that she has spinal stenosis with neurogenic claudication. We will follow-up and discuss these imaging at her next appointment. She should continue Tylenol for pain control as she has multiple comorbidities.        RTC in 2-6 weeks

## 2018-11-29 NOTE — TELEPHONE ENCOUNTER
[employer] for authorization of approval for MRI L-spine wo. Approval was given with  Authorization # [id number] effective 11/29/18 to 12/28/18. Will call Pt. To inform. L/m advising of approval. Can proceed with scheduling appt.

## 2018-11-30 ENCOUNTER — HOSPITAL ENCOUNTER (EMERGENCY)
Facility: HOSPITAL | Age: 73
Discharge: HOME | End: 2018-11-30
Attending: EMERGENCY MEDICINE
Payer: COMMERCIAL

## 2018-11-30 ENCOUNTER — NURSE TRIAGE (OUTPATIENT)
Dept: OTHER | Age: 73
End: 2018-11-30

## 2018-11-30 VITALS
RESPIRATION RATE: 19 BRPM | SYSTOLIC BLOOD PRESSURE: 126 MMHG | HEART RATE: 107 BPM | HEIGHT: 65 IN | BODY MASS INDEX: 29.99 KG/M2 | TEMPERATURE: 98 F | OXYGEN SATURATION: 100 % | DIASTOLIC BLOOD PRESSURE: 85 MMHG | WEIGHT: 180 LBS

## 2018-11-30 DIAGNOSIS — N30.00 ACUTE CYSTITIS WITHOUT HEMATURIA: ICD-10-CM

## 2018-11-30 DIAGNOSIS — I48.91 ATRIAL FIBRILLATION WITH RAPID VENTRICULAR RESPONSE (HCC): ICD-10-CM

## 2018-11-30 DIAGNOSIS — R73.9 HYPERGLYCEMIA: Primary | ICD-10-CM

## 2018-11-30 PROCEDURE — 82962 GLUCOSE BLOOD TEST: CPT

## 2018-11-30 PROCEDURE — 80048 BASIC METABOLIC PNL TOTAL CA: CPT | Performed by: EMERGENCY MEDICINE

## 2018-11-30 PROCEDURE — 99285 EMERGENCY DEPT VISIT HI MDM: CPT

## 2018-11-30 PROCEDURE — 96361 HYDRATE IV INFUSION ADD-ON: CPT

## 2018-11-30 PROCEDURE — 96375 TX/PRO/DX INJ NEW DRUG ADDON: CPT

## 2018-11-30 PROCEDURE — 96374 THER/PROPH/DIAG INJ IV PUSH: CPT

## 2018-11-30 PROCEDURE — 81001 URINALYSIS AUTO W/SCOPE: CPT | Performed by: EMERGENCY MEDICINE

## 2018-11-30 PROCEDURE — 85025 COMPLETE CBC W/AUTO DIFF WBC: CPT | Performed by: EMERGENCY MEDICINE

## 2018-11-30 PROCEDURE — 82805 BLOOD GASES W/O2 SATURATION: CPT | Performed by: EMERGENCY MEDICINE

## 2018-11-30 RX ORDER — NITROFURANTOIN 25; 75 MG/1; MG/1
100 CAPSULE ORAL 2 TIMES DAILY
Qty: 14 CAPSULE | Refills: 0 | Status: SHIPPED | OUTPATIENT
Start: 2018-11-30 | End: 2018-12-07

## 2018-11-30 RX ORDER — METOPROLOL TARTRATE 5 MG/5ML
10 INJECTION INTRAVENOUS ONCE
Status: COMPLETED | OUTPATIENT
Start: 2018-11-30 | End: 2018-11-30

## 2018-11-30 NOTE — ED PROVIDER NOTES
Patient Seen in: [facility] Emergency Department    History   Patient presents with:  Hyperglycemia (metabolic)    Stated Complaint: high blood sugar     HPI    52-year-old female with history of diabetes, on Levemir and metformin, hypertension, h COLONOSCOPY N/A 1/12/2017    Performed by Tj Iglesias MD at [address] [place] [address] ENDOSCOPY   • ELECTROCARDIOGRAM, COMPLETE  4/21/2014    Scanned to media tab   • HYSTERECTOMY  2014    TLH/BSO/ A&P repair/ uteroscral spine fixation   • OTHER SURGICAL HISTORY Right 201 normal. Pupils are equal, round, and reactive to light. Neck: Normal range of motion. Cardiovascular: Normal rate and regular rhythm. No murmur heard.   2+ radial and DP pulses b/l, no leg swelling   Pulmonary/Chest: Effort normal and breath sounds no Venous pH 7.41 7.32 - 7.43    Venous pCO2 41 38 - 50 mm Hg    Venous pO2 53 (H) 35 - 40 mm Hg    Venous Bicarbonate 25.6 22.0 - 26.0 mEq/L    Blood Gas Base Excess 1.2 -2.0 - 2.0 mmol/L    Venous O2 Saturation 90.4 (H) 60.0 - 85.0 %    [facility] - 8.0    Protein Urine Negative Negative mg/dL    Glucose Urine >=500 (A) Negative mg/dL    Ketones Urine Negative Negative mg/dL    Bilirubin Urine Negative Negative    Blood Urine Negative Negative    Nitrite Urine Positive (A) Negative    Urobilinog Hyperlipidemia; Hypertension; Osteoarthritis; Murmur; Noncompliance with diabetes treatment; Lesion of breast; Diverticulosis of large intestine without hemorrhage; De Quervain's tenosynovitis, left; Ganglion, left wrist; Floaters;  Atrial fibrillation, new including reassessment of your blood pressure.     Medications Prescribed:  Current Discharge Medication List    START taking these medications    Nitrofurantoin Monohyd Macro 100 MG Oral Cap  Take 1 capsule (100 mg total) by mouth 2 (two) times daily for 7

## 2018-11-30 NOTE — TELEPHONE ENCOUNTER
Action Requested: Summary for Provider     []  Critical Lab, Recommendations Needed  [] Need Additional Advice  []   FYI    []   Need Orders  [] Need Medications Sent to Pharmacy  []  Other     SUMMARY: daughter driving pt to ER for immediate eval    Recei

## 2018-11-30 NOTE — ED INITIAL ASSESSMENT (HPI)
Pt to ER with c/o elevated blood sugar after cortisone shot to left knee. Pt states sugars in the 500's at home this am. Dr Shine Greco not in office today. Pt denies any other complaints.

## 2018-12-03 ENCOUNTER — TELEPHONE (OUTPATIENT)
Dept: NEUROLOGY | Facility: CLINIC | Age: 73
End: 2018-12-03

## 2018-12-03 ENCOUNTER — MED REC SCAN ONLY (OUTPATIENT)
Dept: NEUROLOGY | Facility: CLINIC | Age: 73
End: 2018-12-03

## 2018-12-03 NOTE — TELEPHONE ENCOUNTER
Called [facility] for authorization of approval of LEFT knee CSI cpt codes 90163,07947,  Talked to Janneth Munoz 12. who states  authorization is required, initiated request,Case # [id number]  Will inform JESSICA Taylor so she can fax clinical notes including order to [facility]

## 2018-12-06 NOTE — TELEPHONE ENCOUNTER
Called [facility] to check on status for authorization of approval of LEFT knee CSI cpt codes 67649,62634,.  T/t Roopa CORBIN who states they received clinical notes yesterday, still in review pending approval.

## 2018-12-07 ENCOUNTER — TELEPHONE (OUTPATIENT)
Dept: FAMILY MEDICINE CLINIC | Facility: CLINIC | Age: 73
End: 2018-12-07

## 2018-12-07 NOTE — TELEPHONE ENCOUNTER
Daughter would like to schedule an appointment with Dr. Marino Melgar for the patient for follow up after the ER. Daughter states that the patient's sugar had gone up to 600 after the cortisone shot from the pain management.  Pt is schedule to have another shot

## 2018-12-08 NOTE — TELEPHONE ENCOUNTER
Please call patient to set up an appointment with me, possibly Monday 9 am. Have check her sugar levels BID or TID sand bring #s with her to next appt.

## 2018-12-08 NOTE — TELEPHONE ENCOUNTER
Returned call to daughter regarding appt request, no answer, left vm with request to come Monday 9am and instructions noted per Dr Demario Linares. Advised she please call us Monday morning at 8am to confirm if patient will be coming in.

## 2018-12-10 ENCOUNTER — OFFICE VISIT (OUTPATIENT)
Dept: FAMILY MEDICINE CLINIC | Facility: CLINIC | Age: 73
End: 2018-12-10
Payer: COMMERCIAL

## 2018-12-10 ENCOUNTER — TELEPHONE (OUTPATIENT)
Dept: NEUROLOGY | Facility: CLINIC | Age: 73
End: 2018-12-10

## 2018-12-10 VITALS
WEIGHT: 176 LBS | DIASTOLIC BLOOD PRESSURE: 90 MMHG | SYSTOLIC BLOOD PRESSURE: 136 MMHG | HEART RATE: 93 BPM | HEIGHT: 64 IN | BODY MASS INDEX: 30.05 KG/M2 | TEMPERATURE: 98 F

## 2018-12-10 DIAGNOSIS — M17.0 PRIMARY OSTEOARTHRITIS OF BOTH KNEES: Primary | ICD-10-CM

## 2018-12-10 DIAGNOSIS — E11.65 UNCONTROLLED TYPE 2 DIABETES MELLITUS WITH HYPERGLYCEMIA (HCC): Primary | ICD-10-CM

## 2018-12-10 DIAGNOSIS — M17.12 PRIMARY OSTEOARTHRITIS OF LEFT KNEE: ICD-10-CM

## 2018-12-10 PROCEDURE — 99212 OFFICE O/P EST SF 10 MIN: CPT | Performed by: FAMILY MEDICINE

## 2018-12-10 PROCEDURE — 99214 OFFICE O/P EST MOD 30 MIN: CPT | Performed by: FAMILY MEDICINE

## 2018-12-10 NOTE — TELEPHONE ENCOUNTER
Dorothy Antony Nura's daughter called to advised that her mom will be coming in for the 9:00 am.    Daughter stated a voicemail was left.     Daughter stated she is calling to confirm that her mom will be at the 9:00 am

## 2018-12-10 NOTE — PROGRESS NOTES
12/10/2018  9:48 AM    Liliana Mcdonnell is a 68year old female. Chief complaint(s): Patient presents with:  ER F/U  Hyperglycemia  UTI    HPI:     Liliana Mcdonnell primary complaint is regarding as above, UTI resolved.      Patient [place] effusions and swelling.  Pertinent medical history is remarkable for DM. Has not been to see orthopedic.       HISTORY:  Past Medical History:   Diagnosis Date   • Cataract 2009   • Cholelithiasis    • Cold with flu    • Cup to disc asymmetry 2009   • Diabe Dose 72 yr and older ([id number])                          01/09/2018      Influenza             10/13/2009  10/08/2013      Influenza Vaccine, Preserv Free                          12/04/2007  10/13/2009      Pneumococcal (Prevnar 13) Rfl: 11   aspirin EC 81 MG Oral Tab EC Take 1 tablet by mouth daily. Disp: 90 tablet Rfl: 4   naproxen (NAPROSYN) 500 MG Oral Tab Take 1 tablet (500 mg total) by mouth 2 (two) times daily with meals. Take for 2 weeks as directed and then as needed.  Disp: 6 [place] Counts   Results for orders placed or performed during the hospital encounter of 24/36/98   BASIC METABOLIC PANEL (8)   Result Value Ref Range    Glucose 449 (H) 70 - 99 mg/dL    Sodium 131 (L) 136 - 144 mmol/L    Potassium 4.5 3.3 - 5.1 POCT GLUCOSE   Result Value Ref Range    POC Glucose  232 (H) 70 - 99   RAINBOW DRAW BLUE   Result Value Ref Range    Hold Blue Auto Resulted    RAINBOW DRAW LAVENDER   Result Value Ref Range    Hold Lavender Auto Resulted    RAINBOW DRAW DARK GREEN   Re sugar--------2 units 201-250----------------4 units 251-300----------------6 units 301-350----------------8 ubits, Disp: 6 pen, Rfl: 2  •  Rivaroxaban 20 MG Oral Tab, Take 1 tablet (20 mg total) by mouth daily with food. , Disp: 30 tablet, Rfl: 11  •  bradley meals only:  175-200 sugar--------2 units  201-250----------------4 units  251-300----------------6 units  301-350----------------8 ubits      REFERRALS: Orders Placed This Encounter         Ophthomology Dr Brianne Doan: instructed in use of insulin detemir (LEVEMIR FLEXTOUCH) 100 UNIT/ML Subcutaneous Solution Pen-injector, INJECT 25 UNITS UNDER THE SKIN EVERY DAY, Disp: 90 mL, Rfl: 2  •  MetFORMIN HCl 850 MG Oral Tab, TAKE ONE TABLET BY MOUTH THREE TIMES DAILY WITH MEALS (Patient taking diffe Prescriptions     Signed Prescriptions Disp Refills   • Insulin Lispro (HUMALOG KWIKPEN) 100 UNIT/ML Subcutaneous Solution Pen-injector 6 pen 2     Sig: Inject 3 Units into the skin 3 (three) times daily with meals.  Sliding scale with meals only:  175-200

## 2018-12-10 NOTE — TELEPHONE ENCOUNTER
Spoke with patient's daughter, Nathaniel Jon (HIPAA verified). Nathaniel Leonemaury was calling to schedule RIGHT knee injection, as they did left knee injection in the office on 11/29/18. Order for Right knee injection has been authorized per 10/18/18 order.      Ulices

## 2018-12-10 NOTE — TELEPHONE ENCOUNTER
Noted thanks. . Patient arrived on time. TONSILLECTOMY AND ADENOIDECTOMY WITH COBLATION  PROCEDURE NOTE    Chau Bansal  8/15/2017    Pre-op Diagnosis:   Snoring [R06.83]  Obstructive sleep apnea [G47.33]  Adenotonsillar hypertrophy [J35.3]    Post-op Diagnosis:     Post-Op Diagnosis Codes:     * Snoring [R06.83]     * Obstructive sleep apnea [G47.33]     * Adenotonsillar hypertrophy [J35.3]    Procedure/CPT® Codes:  [place] REMOVE TONSILS/ADENOIDS,<13 Y/O [69966]    Procedure(s):  TONSILLECTOMY AND ADENOIDECTOMY WITH COBLATION    Surgeon(s):  Gomez Galindo MD    Anesthesia:   General    Staff:   Circulator: Stacy Hernandez RN  Scrub Person: Angeles Valadez; Rachel Pinzon    Estimated Blood Loss:   Minimal    Specimens:                Tonsils      Drains:  none    Findings:   Tonsils: 3+, Adenoids: 3+    Complications:   none    Reason for the Operation:  Chau Bansal is a 10 y.o. male who has had tonsil hypertrophy and sleep apnea. A tonsillectomy and adenoidectomy were recommended. The risks and benefits were explained including but not limited to early and late bleeding, infection, risks of the general anesthesia, dysphagia and poor PO intake, and voice change/VPI.  Alternatives were discussed. Questions were asked appropriately answered.      Procedure Description:  The patient was taken back to the operating room, placed supine on the operating table and placed under anesthesia by the anesthesia staff. Once this was done a time out was performed to confirm the patient and the proper procedure. A Rachael-Isrrael mouth gag inserted and opened to its widest extent. The palate was examined and no submucous cleft palate noted. A tonsillectomy and adenoidectomy were performed. Using meticulous dissection in the subcapsular plane the left and right tonsils were removed using coblation. Adequate hemostasis was assured with coblation. Instrument settings were at 7 ablation and 3 coagulation for this portion of the procedure. To achieve palate retraction, a  single red rubber catheter were inserted through the nose and brought out through the mouth. Using coblation, the adenoids were removed under indirect mirror visualization. Adequate hemostasis was assured with coblation prior to removing equipment. Instrument setting were at 9 ablation and 3 coagulation for this portion of the procedure.  The patient was then turned over to the anesthesia team and allowed to wake from anesthesia. The patient was transported to the recovery room in a stable condition.       Gomez Galindo MD     Date: 8/15/2017  Time: 7:09 AM

## 2018-12-11 ENCOUNTER — HOSPITAL ENCOUNTER (OUTPATIENT)
Dept: MRI IMAGING | Facility: HOSPITAL | Age: 73
Discharge: HOME | End: 2018-12-11
Attending: PHYSICAL MEDICINE & REHABILITATION
Payer: COMMERCIAL

## 2018-12-11 DIAGNOSIS — M48.062 LUMBAR STENOSIS WITH NEUROGENIC CLAUDICATION: ICD-10-CM

## 2018-12-11 DIAGNOSIS — G89.29 CHRONIC BILATERAL LOW BACK PAIN WITHOUT SCIATICA: ICD-10-CM

## 2018-12-11 DIAGNOSIS — M54.50 CHRONIC BILATERAL LOW BACK PAIN WITHOUT SCIATICA: ICD-10-CM

## 2018-12-11 PROCEDURE — 72148 MRI LUMBAR SPINE W/O DYE: CPT | Performed by: PHYSICAL MEDICINE & REHABILITATION

## 2018-12-11 NOTE — TELEPHONE ENCOUNTER
Called [facility] to check on status for retro authorization of approval of LEFT knee CSI cpt codes 72192,40733,. Case # [id number].  T/t Cyn KIRAN who states it is still pending

## 2018-12-13 ENCOUNTER — TELEPHONE (OUTPATIENT)
Dept: NEUROLOGY | Facility: CLINIC | Age: 73
End: 2018-12-13

## 2018-12-13 ENCOUNTER — OFFICE VISIT (OUTPATIENT)
Dept: NEUROLOGY | Facility: CLINIC | Age: 73
End: 2018-12-13
Payer: MEDICARE

## 2018-12-13 VITALS
SYSTOLIC BLOOD PRESSURE: 134 MMHG | HEIGHT: 65 IN | DIASTOLIC BLOOD PRESSURE: 80 MMHG | WEIGHT: 176 LBS | BODY MASS INDEX: 29.32 KG/M2

## 2018-12-13 DIAGNOSIS — M47.817 DJD (DEGENERATIVE JOINT DISEASE), LUMBOSACRAL: ICD-10-CM

## 2018-12-13 DIAGNOSIS — M17.0 PRIMARY OSTEOARTHRITIS OF BOTH KNEES: ICD-10-CM

## 2018-12-13 DIAGNOSIS — M43.17 SPONDYLOLISTHESIS AT L5-S1 LEVEL: Primary | ICD-10-CM

## 2018-12-13 DIAGNOSIS — M48.062 SPINAL STENOSIS OF LUMBAR REGION WITH NEUROGENIC CLAUDICATION: ICD-10-CM

## 2018-12-13 PROCEDURE — 99213 OFFICE O/P EST LOW 20 MIN: CPT | Performed by: PHYSICAL MEDICINE & REHABILITATION

## 2018-12-13 PROCEDURE — 20611 DRAIN/INJ JOINT/BURSA W/US: CPT | Performed by: PHYSICAL MEDICINE & REHABILITATION

## 2018-12-13 RX ORDER — LIDOCAINE HYDROCHLORIDE 10 MG/ML
5 INJECTION, SOLUTION INFILTRATION; PERINEURAL ONCE
Status: COMPLETED | OUTPATIENT
Start: 2018-12-13 | End: 2018-12-13

## 2018-12-13 RX ORDER — LIDOCAINE HYDROCHLORIDE 10 MG/ML
4 INJECTION, SOLUTION INFILTRATION; PERINEURAL ONCE
Status: COMPLETED | OUTPATIENT
Start: 2018-12-13 | End: 2018-12-13

## 2018-12-13 RX ORDER — TRIAMCINOLONE ACETONIDE 40 MG/ML
40 INJECTION, SUSPENSION INTRA-ARTICULAR; INTRAMUSCULAR ONCE
Status: COMPLETED | OUTPATIENT
Start: 2018-12-13 | End: 2018-12-13

## 2018-12-13 NOTE — PROGRESS NOTES
[address]  Progress Note    CHIEF COMPLAINT:  Patient presents with: Injection: here for R knee CSI under US. aggravated by walking. History of Present Illness:   The patient is a 68year old right ross 1/12/2017    Performed by Jackalyn Duane, MD at [address] [place] [address] ENDOSCOPY   • ELECTROCARDIOGRAM, COMPLETE  4/21/2014    Scanned to media tab   • HYSTERECTOMY  2014    TLH/BSO/ A&P repair/ uteroscral spine fixation   • OTHER SURGICAL HISTORY Right 2012    Arthroscopy ubits Disp: 6 pen Rfl: 2   insulin detemir (LEVEMIR FLEXTOUCH) 100 UNIT/ML Subcutaneous Solution Pen-injector INJECT 25 UNITS UNDER THE SKIN EVERY DAY Disp: 90 mL Rfl: 2   Insulin Pen Needle (BD PEN NEEDLE TERENCE U/F) 32G X 4 MM Does not apply Misc Use as di deformity  Palpation:  no ttp over tibial tubercle, gerdy's tubercle, medial or lateral hamstring attachment, posterior calf, or popliteal fossa.   Tenderness over medial or lateral patella facets, medial joint line, lateral joint line, pes anserine bursa, Applicable   Final   • Puncture Charge 11/30/2018 No   Final   • Blood Gas Analyzer 11/30/2018 [id number] ED   Final   • Urine Color 11/30/2018 Yellow  Yellow Final   • Clarity Urine 11/30/2018 Clear  Clear Final   • Spec Gravity 11/30/2018 1.030  1.002 - 1.0 Final   • Basophil % 11/30/2018 1  % Final   • Neutrophil Absolute 11/30/2018 5.7  1.8 - 7.7 K/UL Final   • Lymphocyte Absolute 11/30/2018 1.1  1.0 - 4.0 K/UL Final   • Monocyte Absolute 11/30/2018 0.5  0.0 - 1.0 K/UL Final   • Eosinophil Absolute 11/30/20 08/10/2018 13.6  11.0 - 15.0 % Final   • PLT 08/10/2018 268  140 - 400 K/UL Final   • MPV 08/10/2018 9.9  7.4 - 10.3 fL Final   • Neutrophil % 08/10/2018 61  % Final   • Lymphocyte % 08/10/2018 28  % Final   • Monocyte % 08/10/2018 8  % Final   • Eosinophi 11.0 K/UL Final   • RBC 08/11/2018 4.00  3.70 - 5.40 M/UL Final   • HGB 08/11/2018 12.2  12.0 - 16.0 g/dL Final   • HCT 08/11/2018 35.9  35.0 - 48.0 % Final   • MCV 08/11/2018 89.8  80.0 - 100.0 fL Final   • MCH 08/11/2018 30.4  27.0 - 32.0 pg Final   • [facility] 08/12/2018 36.5  35.0 - 48.0 % Final   • MCV 08/12/2018 89.9  80.0 - 100.0 fL Final   • MCH 08/12/2018 31.0  27.0 - 32.0 pg Final   • MCHC 08/12/2018 34.5  32.0 - 37.0 g/dl Final   • RDW 08/12/2018 13.3  11.0 - 15.0 % Final   • PLT 08/12/2018 240  140 - 40 Health, XR LUMBAR SPINE (MIN 4 VIEWS) (CPT=72110), 10/18/2018, 16:07.      INDICATIONS: G89.29 Other chronic pain M54.5 Low back pain M48.062 Spinal stenosis, lumbar region with neurogenic claudication     TECHNIQUE: A variety of imaging planes and paramete of her lumbar MRI. I did sit down and extensively review her imaging with the patient. We have discussed her pathology including her scoliosis as well as spondylolisthesis and moderate to severe stenosis of the lumbar spine.   We have also discussed sin

## 2018-12-13 NOTE — PROCEDURES
[address]   Knee Joint Injection Procedure Note    CHIEF COMPLAINT:  Patient presents with: Injection: here for R knee CSI under US. aggravated by walking.       PROCEDURE PERFORMED:  RIGHT knee intra-artic 11/30/2018 No   Final   • Blood Gas Analyzer 11/30/2018 [id number] ED   Final   • Urine Color 11/30/2018 Yellow  Yellow Final   • Clarity Urine 11/30/2018 Clear  Clear Final   • Spec Gravity 11/30/2018 1.030  1.002 - 1.035 Final   • pH Urine 11/30/2018 5.0  5 • Neutrophil Absolute 11/30/2018 5.7  1.8 - 7.7 K/UL Final   • Lymphocyte Absolute 11/30/2018 1.1  1.0 - 4.0 K/UL Final   • Monocyte Absolute 11/30/2018 0.5  0.0 - 1.0 K/UL Final   • Eosinophil Absolute 11/30/2018 0.0  0.0 - 0.7 K/UL Final   • Basophil A 08/10/2018 268  140 - 400 K/UL Final   • MPV 08/10/2018 9.9  7.4 - 10.3 fL Final   • Neutrophil % 08/10/2018 61  % Final   • Lymphocyte % 08/10/2018 28  % Final   • Monocyte % 08/10/2018 8  % Final   • Eosinophil % 08/10/2018 2  % Final   • Basophil % 08/1 3.70 - 5.40 M/UL Final   • HGB 08/11/2018 12.2  12.0 - 16.0 g/dL Final   • HCT 08/11/2018 35.9  35.0 - 48.0 % Final   • MCV 08/11/2018 89.8  80.0 - 100.0 fL Final   • MCH 08/11/2018 30.4  27.0 - 32.0 pg Final   • MCHC 08/11/2018 33.9  32.0 - 37.0 g/dl [place] 08/12/2018 89.9  80.0 - 100.0 fL Final   • MCH 08/12/2018 31.0  27.0 - 32.0 pg Final   • MCHC 08/12/2018 34.5  32.0 - 37.0 g/dl Final   • RDW 08/12/2018 13.3  11.0 - 15.0 % Final   • PLT 08/12/2018 240  140 - 400 K/UL Final   • MPV 08/12/2018 9.5  7.4 - 10 high frequency probe, themedial patellafemoral space  was visualized.  A 25-gauge 1.5 inch needle with a 5 cc syringe of 1% lidocaine was introduced through the superomedial approach and created a tract using 3 cc of 1% lidocaine and was seen entering the t

## 2018-12-13 NOTE — TELEPHONE ENCOUNTER
Called [facility] for authorization of approval of Cuadal epidural steroid injection cpt code 25598. Talked to Saritha Maurer who initiated request. Case # [id number]. Will ask KRISTEN Lennon to fax clinical notes to [facility] fax number [phone](PYWW above case #) when Kenia not

## 2018-12-14 ENCOUNTER — TELEPHONE (OUTPATIENT)
Dept: NEUROLOGY | Facility: CLINIC | Age: 73
End: 2018-12-14

## 2018-12-14 ENCOUNTER — MED REC SCAN ONLY (OUTPATIENT)
Dept: NEUROLOGY | Facility: CLINIC | Age: 73
End: 2018-12-14

## 2018-12-14 NOTE — TELEPHONE ENCOUNTER
Pts daughter Macy Barone has questions for you regarding pts visit yesterday and was told that she could call and speak with you today she is also  HIPPA verified, please advise

## 2018-12-17 ENCOUNTER — TELEPHONE (OUTPATIENT)
Dept: NEUROLOGY | Facility: CLINIC | Age: 73
End: 2018-12-17

## 2018-12-17 NOTE — TELEPHONE ENCOUNTER
Called patient back to speak to her daughter, Lizbeth Márquez. There was no anser. Voicemail left to call back. Would like to discuss Caudal epidural injections. Kareem Teresa.  Dayna Tillman MD, [facility]  Physical Medicine and Rehabilitation/Sports Medicine  [place]

## 2018-12-20 ENCOUNTER — TELEPHONE (OUTPATIENT)
Dept: FAMILY MEDICINE CLINIC | Facility: CLINIC | Age: 73
End: 2018-12-20

## 2018-12-20 NOTE — TELEPHONE ENCOUNTER
Per pt the needles are too short. Please advise.  Thank you    Current Outpatient Medications:  Insulin Pen Needle (BD PEN NEEDLE TERENCE U/F) 32G X 4 MM Does not apply Misc Use as directed daily Disp: 100 each Rfl: 6

## 2018-12-21 NOTE — TELEPHONE ENCOUNTER
Refill passed per [facility] protocol.   Refill Protocol Appointment Criteria  · Appointment scheduled in the past 12 months or in the next 3 months  Recent Outpatient Visits            1 week ago Spondylolisthesis at L5-S1 level    Chandan Ivan

## 2018-12-21 NOTE — TELEPHONE ENCOUNTER
On call note: Was called on 12/20/18 by patient that wrong needles for insulin were sent.  Pharmacy called and approved right needles times one refill; further refills as per Dr Eleni Fuentes

## 2018-12-27 NOTE — TELEPHONE ENCOUNTER
Called [facility] to check on status for authorization of approval of Cuadal epidural steroid injection cpt code 16288. Case # [id number]. T/t Dayne Mack.      who approved it . Authorization # [id number] effective 12/27/18 to 01/31/19.  Will inform Nursing

## 2018-12-27 NOTE — TELEPHONE ENCOUNTER
Per telephone encounter on 12/17/18 Dr. Marcos Mitchell patient back to speak to her daughter, Valerie Mahoney. There was no anser. Voicemail left to call back. Would like to discuss Caudal epidural injections. \"    LMTCB -  to schedule injection. [place]ified [place] [id number] [place] [address] [place].

## 2019-01-10 ENCOUNTER — TELEPHONE (OUTPATIENT)
Dept: NEUROLOGY | Facility: CLINIC | Age: 74
End: 2019-01-10

## 2019-01-10 ENCOUNTER — OFFICE VISIT (OUTPATIENT)
Dept: NEUROLOGY | Facility: CLINIC | Age: 74
End: 2019-01-10
Payer: MEDICARE

## 2019-01-10 VITALS
BODY MASS INDEX: 29.32 KG/M2 | DIASTOLIC BLOOD PRESSURE: 84 MMHG | HEIGHT: 65 IN | WEIGHT: 176 LBS | HEART RATE: 64 BPM | SYSTOLIC BLOOD PRESSURE: 128 MMHG

## 2019-01-10 DIAGNOSIS — G89.29 CHRONIC BILATERAL LOW BACK PAIN WITHOUT SCIATICA: ICD-10-CM

## 2019-01-10 DIAGNOSIS — M17.0 BILATERAL PRIMARY OSTEOARTHRITIS OF KNEE: ICD-10-CM

## 2019-01-10 DIAGNOSIS — M43.17 SPONDYLOLISTHESIS AT L5-S1 LEVEL: ICD-10-CM

## 2019-01-10 DIAGNOSIS — M54.50 CHRONIC BILATERAL LOW BACK PAIN WITHOUT SCIATICA: ICD-10-CM

## 2019-01-10 DIAGNOSIS — M22.2X9 PATELLOFEMORAL PAIN SYNDROME, UNSPECIFIED LATERALITY: ICD-10-CM

## 2019-01-10 DIAGNOSIS — M48.062 LUMBAR STENOSIS WITH NEUROGENIC CLAUDICATION: Primary | ICD-10-CM

## 2019-01-10 DIAGNOSIS — M47.817 DJD (DEGENERATIVE JOINT DISEASE), LUMBOSACRAL: ICD-10-CM

## 2019-01-10 PROCEDURE — 99214 OFFICE O/P EST MOD 30 MIN: CPT | Performed by: PHYSICAL MEDICINE & REHABILITATION

## 2019-01-10 NOTE — PROGRESS NOTES
[address]  Progress Note    CHIEF COMPLAINT:  Patient presents with:  Knee Pain: LOV 12-13-18 pt is here to f/u B knee pain 6/10. pt had knee injections 70% pain relief, pain have being on and off. not taki hypertension    • Visual floaters 2009       SURGICAL HISTORY:  Past Surgical History:   Procedure Laterality Date   • ARTHROSCOPY SHOULDER WITH ROTATOR CUFF REPAIR Right 5/27/2015    Performed by Jacek Watson MD at [place] [address]   • ARTHROSCOPY, Oral Tab Take 1 tablet (120 mg total) by mouth daily.  Disp: 30 tablet Rfl: 5   insulin detemir (LEVEMIR FLEXTOUCH) 100 UNIT/ML Subcutaneous Solution Pen-injector INJECT 25 UNITS UNDER THE SKIN EVERY DAY Disp: 90 mL Rfl: 2   MetFORMIN HCl 850 MG Oral Tab TA edema bilaterally   Skin: No lesions noted.    Cognition: alert & oriented x 3, attentive, able to follow 2 step commands, comprehention intact, spontaneous speech intact      Motor:    Musculoskeletal:    LUMBAR SPINE:  Inspection: no erythema, swelling, o • Venous pO2 11/30/2018 53* 35 - 40 mm Hg Final   • Venous Bicarbonate 11/30/2018 25.6  22.0 - 26.0 mEq/L Final   • Blood Gas Base Excess 11/30/2018 1.2  -2.0 - 2.0 mmol/L Final   • Venous O2 Saturation 11/30/2018 90.4* 60.0 - 85.0 % Final   • Oxygen Del • MCHC 11/30/2018 33.6  32.0 - 37.0 g/dl Final   • RDW 11/30/2018 14.7  11.0 - 15.0 % Final   • PLT 11/30/2018 213  140 - 400 K/UL Final   • MPV 11/30/2018 10.1  7.4 - 10.3 fL Final   • Neutrophil % 11/30/2018 78  % Final   • Lymphocyte % 11/30/2018 15 08/10/2018 6.7  4.0 - 11.0 K/UL Final   • RBC 08/10/2018 4.25  3.70 - 5.40 M/UL Final   • HGB 08/10/2018 13.0  12.0 - 16.0 g/dL Final   • HCT 08/10/2018 38.3  35.0 - 48.0 % Final   • MCV 08/10/2018 90.2  80.0 - 100.0 fL Final   • MCH 08/10/2018 30.5  27.0 0 - 18 mmol/L Final   • Calculated Osmolality 08/11/2018 293  275 - 295 mOsm/kg Final   • GFR, Non- 08/11/2018 >60  >=60 Final   • GFR, -American 08/11/2018 >60  >=60 Final   • TSH 08/11/2018 1.89  0.45 - 5.33 uIU/mL Final   • Venkat • Calculated Osmolality 08/12/2018 291  275 - 295 mOsm/kg Final   • GFR, Non- 08/12/2018 >60  >=60 Final   • GFR, -American 08/12/2018 >60  >=60 Final   • WBC 08/12/2018 5.7  4.0 - 11.0 K/UL Final   • RBC 08/12/2018 4.06  3.70 - 5. OTHER: Negative. LUMBAR DISC LEVELS:  The numbering scheme designates the last fully formed intervertebral disc space as L5-S1. L5 appears to be partially transitional as reported on prior radiographs.      L1-L2: Broad disc bulge without significant interlaminar injection. RTC 2 weeks postinjection  Discharge Instructions were provided as documented in AVS summary. The patient was in agreement with the assessment and plan. All questions were answered. There were no barriers to learning.      L

## 2019-01-10 NOTE — TELEPHONE ENCOUNTER
Called [facility] for authorization of approval of LEFT L5-S1 ILESI  Cpt code 08952. Talked to Morgan Goode. who approved cpt code 93030 on 12/13/18(For caudal MO under fluoro same cpt code 48354; procedure was not done) Reference #   [id number](1OS injection)  For one u

## 2019-01-10 NOTE — PATIENT INSTRUCTIONS
1) LEFT L5-S1 Intralaminar epidural steroid injection. We will call you to schedule it once the insurance approves the procedure.   2) I will see you again in the office 2 weeks after the procedure  3) In 6 weeks, I would like to see you again for your BILA

## 2019-01-11 ENCOUNTER — OFFICE VISIT (OUTPATIENT)
Dept: FAMILY MEDICINE CLINIC | Facility: CLINIC | Age: 74
End: 2019-01-11
Payer: COMMERCIAL

## 2019-01-11 ENCOUNTER — LAB ENCOUNTER (OUTPATIENT)
Dept: LAB | Age: 74
End: 2019-01-11
Attending: FAMILY MEDICINE
Payer: COMMERCIAL

## 2019-01-11 VITALS
WEIGHT: 175 LBS | TEMPERATURE: 99 F | HEART RATE: 74 BPM | SYSTOLIC BLOOD PRESSURE: 136 MMHG | DIASTOLIC BLOOD PRESSURE: 75 MMHG | BODY MASS INDEX: 29 KG/M2

## 2019-01-11 DIAGNOSIS — R35.89 POLYURIA: ICD-10-CM

## 2019-01-11 DIAGNOSIS — E11.65 UNCONTROLLED TYPE 2 DIABETES MELLITUS WITH HYPERGLYCEMIA (HCC): Primary | ICD-10-CM

## 2019-01-11 DIAGNOSIS — I10 ESSENTIAL HYPERTENSION: ICD-10-CM

## 2019-01-11 LAB
BILIRUB UR QL: NEGATIVE
COLOR UR: YELLOW
GLUCOSE UR-MCNC: >=500 MG/DL
HYALINE CASTS #/AREA URNS AUTO: 9 /LPF
KETONES UR-MCNC: NEGATIVE MG/DL
NITRITE UR QL STRIP.AUTO: NEGATIVE
PH UR: 5 [PH] (ref 5–8)
PROT UR-MCNC: 100 MG/DL
RBC #/AREA URNS AUTO: 34 /HPF
SP GR UR STRIP: 1.02 (ref 1–1.03)
UROBILINOGEN UR STRIP-ACNC: <2
VIT C UR-MCNC: NEGATIVE MG/DL
WBC #/AREA URNS AUTO: 1424 /HPF

## 2019-01-11 PROCEDURE — 87186 SC STD MICRODIL/AGAR DIL: CPT

## 2019-01-11 PROCEDURE — 81001 URINALYSIS AUTO W/SCOPE: CPT

## 2019-01-11 PROCEDURE — 87077 CULTURE AEROBIC IDENTIFY: CPT

## 2019-01-11 PROCEDURE — 99212 OFFICE O/P EST SF 10 MIN: CPT | Performed by: FAMILY MEDICINE

## 2019-01-11 PROCEDURE — 87086 URINE CULTURE/COLONY COUNT: CPT

## 2019-01-11 PROCEDURE — 99214 OFFICE O/P EST MOD 30 MIN: CPT | Performed by: FAMILY MEDICINE

## 2019-01-11 RX ORDER — DILTIAZEM HYDROCHLORIDE 120 MG/1
120 TABLET, FILM COATED ORAL DAILY
Qty: 30 TABLET | Refills: 5 | Status: SHIPPED | OUTPATIENT
Start: 2019-01-11 | End: 2019-06-10

## 2019-01-11 NOTE — TELEPHONE ENCOUNTER
Spoke with daughter José Miguel Woods per pt request. Patient has been scheduled for a LEFT L5-S1 ILESI under MAC on 1-22-19 at the [facility]. Medications and allergies reviewed.  Patient informed to hold aspirins, nsaids, blood thinners, multivitamins, vitamin E and fis

## 2019-01-11 NOTE — PROGRESS NOTES
1/11/2019  1:04 PM    Alie Larson is a 68year old female. Chief complaint(s): Patient presents with:  Urinary Frequency    HPI:     Alie Larson primary complaint is regarding polyuria.      Patient to be evaluated for possible urinary trac ENDOSCOPY   • ELECTROCARDIOGRAM, COMPLETE  4/21/2014    Scanned to media tab   • HYSTERECTOMY  2014    TLH/BSO/ A&P repair/ uteroscral spine fixation   • OTHER SURGICAL HISTORY Right 2012    Arthroscopy   • OTHER SURGICAL HISTORY  1995    Open cholecystect Rivaroxaban 20 MG Oral Tab Take 1 tablet (20 mg total) by mouth daily with food. Disp: 30 tablet Rfl: 11   metoprolol Tartrate 25 MG Oral Tab Take 1 tablet (25 mg total) by mouth 2x Daily(Beta Blocker).  Disp: 30 tablet Rfl: 5   insulin detemir (LEVEMIR F HENT:   Head: Normocephalic. Right Ear: External ear normal.   Left Ear: External ear normal.   Nose: No rhinorrhea. Mouth/Throat: Oropharynx is clear and moist.   Eyes: Conjunctivae are normal.   Neck: Neck supple. Cardiovascular: Normal rate. 850 MG Oral Tab, TAKE ONE TABLET BY MOUTH THREE TIMES DAILY WITH MEALS (Patient taking differently: Take 850 mg by mouth 2 (two) times daily with meals.  TAKE ONE TABLET BY MOUTH THREE TIMES DAILY WITH MEALS ), Disp: 270 tablet, Rfl: 2  •  Insulin Pen Needl FLEXTOUCH) 100 UNIT/ML Subcutaneous Solution Pen-injector 12 pen 3     Sig: Inject 25 Units into the skin nightly. RECOMMENDATIONS given include: Please, call our office with any questions or concerns.  Notify Dr Marino Melgar or the [facility] if ther

## 2019-01-13 RX ORDER — CIPROFLOXACIN 500 MG/1
500 TABLET, FILM COATED ORAL 2 TIMES DAILY
Qty: 20 TABLET | Refills: 0 | Status: SHIPPED | OUTPATIENT
Start: 2019-01-13 | End: 2019-01-23

## 2019-01-14 ENCOUNTER — TELEPHONE (OUTPATIENT)
Dept: NEUROLOGY | Facility: CLINIC | Age: 74
End: 2019-01-14

## 2019-01-14 NOTE — TELEPHONE ENCOUNTER
Please let patient know she does not have to hold metformin on day of procedure or after. She can schedule the procedure at her convenience. Her GFR is greater than 60 and her last Cr is less than 1. Very minimal risk to contrast induced nephropathy.     Brittaney Bishop

## 2019-01-14 NOTE — TELEPHONE ENCOUNTER
Pt's daughter (HIPAA verified) called to reschedule pt's injection appointment from 1/21/19 to 2/5/19 due to daughter (pt's transportation) being out of town.      Pt's daughter also explained that pt's primary care doctor advised pt to not stop the metform

## 2019-01-15 ENCOUNTER — TELEPHONE (OUTPATIENT)
Dept: OTHER | Age: 74
End: 2019-01-15

## 2019-01-16 NOTE — TELEPHONE ENCOUNTER
Assisted by  [id number],  Pt advised of recommendations below, melatonin 5 mg x 1 only for the night or tylenol PM and to follow package directions. Pt has already tried tylenol PM, she will try melatonin.   Advised to call us back if sxs do not impr

## 2019-02-01 NOTE — TELEPHONE ENCOUNTER
Called [facility] to request extension for authorization of approval of LEFT L5-S1 ILESI  cpt code 48721. Talked to Sanjeev Toth. who approved cpt code 65245 on 12/13/18(For caudal MO under fluoro same cpt code 35756; procedure was not done) Authorization # [id number]

## 2019-02-04 NOTE — TELEPHONE ENCOUNTER
Spoke to patient's daughter, Elijah Hogan. She states that patient has been feeling nauseous and thinks she is coming down with something. She prefers to hold on injection and reschedule it. Rescheduled patient for left L5-S1 ILESI with MAC.  Reviewed medicati

## 2019-02-04 NOTE — TELEPHONE ENCOUNTER
Daughter Irvin Sharp called to reschedule patient's injection for 2.5.19 - Please call daughter at [phone] to reschedule injection -sd 2.4.19 @ 4:48P

## 2019-02-04 NOTE — TELEPHONE ENCOUNTER
MD Astrid Ernandez MA   Caller: Unspecified (3 weeks ago)             OK to continue Asprin 81 mg throughout the procedure. Should hold Xarelto for 48 hours prior to her procedure with PCP/cardiology approval. Must have approval for this.  OK to

## 2019-02-22 ENCOUNTER — OFFICE VISIT (OUTPATIENT)
Dept: SURGERY | Facility: CLINIC | Age: 74
End: 2019-02-22
Payer: MEDICARE

## 2019-02-22 DIAGNOSIS — M48.062 LUMBAR STENOSIS WITH NEUROGENIC CLAUDICATION: ICD-10-CM

## 2019-02-22 DIAGNOSIS — M54.50 CHRONIC BILATERAL LOW BACK PAIN WITHOUT SCIATICA: ICD-10-CM

## 2019-02-22 DIAGNOSIS — G89.29 CHRONIC BILATERAL LOW BACK PAIN WITHOUT SCIATICA: ICD-10-CM

## 2019-02-22 DIAGNOSIS — M43.17 SPONDYLOLISTHESIS AT L5-S1 LEVEL: Primary | ICD-10-CM

## 2019-02-22 PROCEDURE — 62323 NJX INTERLAMINAR LMBR/SAC: CPT | Performed by: PHYSICAL MEDICINE & REHABILITATION

## 2019-02-22 NOTE — PROCEDURES
[address]    LUMBAR INTERLAMINAR  NAME:  Michele Sarkar    MR #:    [id number] :  1945     PHYSICIAN:  Jo Ann Dhillon        Operative Report    DATE OF PROCEDURE: 2019   PREOPERATIVE DIAGNOSES: 1.  Spondylolisthesis at was given discharge instructions and will follow up in the clinic as scheduled. Throughout the whole procedure, the patient's pulse oximetry and vital signs were monitored and they remained completely stable.   Also, throughout the whole procedure, prior t

## 2019-02-26 ENCOUNTER — TELEPHONE (OUTPATIENT)
Dept: NEUROLOGY | Facility: CLINIC | Age: 74
End: 2019-02-26

## 2019-02-26 NOTE — TELEPHONE ENCOUNTER
----- Message from Amadeo Paul MD sent at 2/26/2019  7:27 AM CST -----  Please call this patient to follow up post-injection on 2/22/19. Thanks    Jorden Harp.  Roselia Merritt MD, [facility]  Physical Medicine and Rehabilitation/Sports Medicine  [facility]

## 2019-03-07 ENCOUNTER — OFFICE VISIT (OUTPATIENT)
Dept: NEUROLOGY | Facility: CLINIC | Age: 74
End: 2019-03-07
Payer: MEDICARE

## 2019-03-07 VITALS — DIASTOLIC BLOOD PRESSURE: 78 MMHG | RESPIRATION RATE: 18 BRPM | HEART RATE: 64 BPM | SYSTOLIC BLOOD PRESSURE: 132 MMHG

## 2019-03-07 DIAGNOSIS — M48.062 LUMBAR STENOSIS WITH NEUROGENIC CLAUDICATION: ICD-10-CM

## 2019-03-07 DIAGNOSIS — M25.562 CHRONIC PAIN OF BOTH KNEES: ICD-10-CM

## 2019-03-07 DIAGNOSIS — G89.29 CHRONIC PAIN OF BOTH KNEES: ICD-10-CM

## 2019-03-07 DIAGNOSIS — M17.0 PRIMARY OSTEOARTHRITIS OF BOTH KNEES: Primary | ICD-10-CM

## 2019-03-07 DIAGNOSIS — M25.561 CHRONIC PAIN OF BOTH KNEES: ICD-10-CM

## 2019-03-07 DIAGNOSIS — M43.17 SPONDYLOLISTHESIS AT L5-S1 LEVEL: ICD-10-CM

## 2019-03-07 DIAGNOSIS — M22.2X9 PATELLOFEMORAL PAIN SYNDROME, UNSPECIFIED LATERALITY: ICD-10-CM

## 2019-03-07 PROCEDURE — 99214 OFFICE O/P EST MOD 30 MIN: CPT | Performed by: PHYSICAL MEDICINE & REHABILITATION

## 2019-03-07 NOTE — PATIENT INSTRUCTIONS
1) My office will call you to schedule the gel and steroid injections to the knees once approved by the insurance. 2) When we do the injections for the knees, we will decide on repeat epidural injections.

## 2019-03-08 ENCOUNTER — TELEPHONE (OUTPATIENT)
Dept: NEUROLOGY | Facility: CLINIC | Age: 74
End: 2019-03-08

## 2019-03-08 NOTE — PROGRESS NOTES
[address]  Progress Note    CHIEF COMPLAINT:  Patient presents with:  Low Back Pain: Follow up after injection. Pt is in a lot of pain since the injection. LOV 2/22/19      History of Present Illness:   The blood pressure    • History of pregnancy      x3 (max 10 lbs), SAB x1   • Hypercholesterolemia    • Lipid screening 2014   • Meniscus tear     Foreign body, meniscus tear   • Osteoporosis screening 2013    Dexa Scan   • Reflux     Takes Does not apply Misc USE WITH INSULIN PEN AS DIRECTED ONCE DAILY Disp: 100 each Rfl: 0   Insulin Lispro (HUMALOG KWIKPEN) 100 UNIT/ML Subcutaneous Solution Pen-injector Inject 3 Units into the skin 3 (three) times daily with meals.  Sliding scale with meals systems reviewed and are negative. Pertinent positives and negatives noted in the HPI. PHYSICAL EXAM:   /78 (BP Location: Right arm, Patient Position: Sitting)   Pulse 64   Resp 18     There is no height or weight on file to calculate BMI. joint line.    ROM:Full active ROM in flexion and extension with crepitus  Jacinto Test: negative for pain over patella  Bounce Home test: negative for \"catch\" or pain  Lachmans: negative for instability  Anterior / Posterior drawer: negative for instabilit BUN 11/30/2018 30* 8 - 20 mg/dL Final   • Creatinine 11/30/2018 0.68  0.50 - 1.50 mg/dL Final   • Calcium, Total 11/30/2018 10.2  8.5 - 10.5 mg/dL Final   • BUN/CREA Ratio 11/30/2018 44.1* 10.0 - 20.0 Final   • Anion Gap 11/30/2018 10  0 - 18 mmol/L Final Final   • Hold Blue 11/30/2018 Auto Resulted   Final   • Hold Lavender 11/30/2018 Auto Resulted   Final   • Hold Lt Green 11/30/2018 Auto Resulted   Final   • Hold Lt Green 11/30/2018 Auto Resulted   Final   • Hold Gold 11/30/2018 Auto Resulted   Final   • FINDINGS:   PARASPINAL AREA: No visible mass. Partially imaged colonic diverticulosis. BONES: No acute fracture. Scoliosis. Grade 1 anterolisthesis of L4 on L5.  CORD/CAUDA EQUINA: No abnormal cord signal identified. OTHER: Negative.      LUMBAR DIS acute fracture or dislocation. Full-thickness chondral loss within the patellofemoral compartment with subchondral cystic change within the patella and trochlea. Calcification is seen along the distal quadriceps tendon consistent with chronic tendinosis. agreement with the assessment and plan. All questions were answered. There were no barriers to learning.     I have spent 20 minutes discussing the key components of this visit including the history, physical exam, diagnostic imaging, and labs, and medica

## 2019-03-08 NOTE — TELEPHONE ENCOUNTER
[facility] [address] for authorization of approval of BILATERAL Synvisc One with corticosteroid injections under ultrasound guidance cpt codes 70173, [id number], X2207W x 2. Talked to Dahiana MATUTE     who states call [facility] for authorization if needed. Called [facility].  Moon Castro

## 2019-03-14 ENCOUNTER — OFFICE VISIT (OUTPATIENT)
Dept: NEUROLOGY | Facility: CLINIC | Age: 74
End: 2019-03-14
Payer: MEDICARE

## 2019-03-14 VITALS — HEART RATE: 61 BPM | SYSTOLIC BLOOD PRESSURE: 128 MMHG | DIASTOLIC BLOOD PRESSURE: 84 MMHG

## 2019-03-14 DIAGNOSIS — M17.0 PRIMARY OSTEOARTHRITIS OF BOTH KNEES: Primary | ICD-10-CM

## 2019-03-14 PROCEDURE — 20611 DRAIN/INJ JOINT/BURSA W/US: CPT | Performed by: PHYSICAL MEDICINE & REHABILITATION

## 2019-03-14 RX ORDER — LIDOCAINE HYDROCHLORIDE 10 MG/ML
5 INJECTION, SOLUTION INFILTRATION; PERINEURAL ONCE
Status: COMPLETED | OUTPATIENT
Start: 2019-03-14 | End: 2019-03-15

## 2019-03-14 RX ORDER — TRIAMCINOLONE ACETONIDE 40 MG/ML
40 INJECTION, SUSPENSION INTRA-ARTICULAR; INTRAMUSCULAR ONCE
Status: COMPLETED | OUTPATIENT
Start: 2019-03-14 | End: 2019-03-15

## 2019-03-14 RX ORDER — LIDOCAINE HYDROCHLORIDE 10 MG/ML
4 INJECTION, SOLUTION INFILTRATION; PERINEURAL ONCE
Status: COMPLETED | OUTPATIENT
Start: 2019-03-14 | End: 2019-03-15

## 2019-03-14 NOTE — PROCEDURES
[address]   Knee Joint Injection Procedure Note    CHIEF COMPLAINT:  Patient presents with:   Injection: LIT Synvisc One Injection       PROCEDURE PERFORMED:  BILATERAL knee intra-articular Synvisc and sannai 11/30/2018 131* 136 - 144 mmol/L Final   • Potassium 11/30/2018 4.5  3.3 - 5.1 mmol/L Final   • Chloride 11/30/2018 98  95 - 110 mmol/L Final   • CO2 11/30/2018 23  22 - 32 mmol/L Final   • BUN 11/30/2018 30* 8 - 20 mg/dL Final   • Creatinine 11/30/2018 0. Squamous Epi.  Cells 11/30/2018 Few  /HPF Final   • WBC Urine 11/30/2018 <1  0 - 5 /HPF Final   • RBC URINE 11/30/2018 3  0 - 3 /HPF Final   • Bacteria Urine 11/30/2018 Moderate* None Seen /HPF Final   • Hold Blue 11/30/2018 Auto Resulted   Final   • Hold L space was visualized. Using a 30-gauge, 1/2-inch needle, 1 cc of 1% lidocaine was used to numb the skin and soft tissues in the anterior medial approach.   The intra-articular space was then accessed using an 18-gauge, 1-1/2-inch needle, which was visualize with the needle accessing the space, the syringe was traded for an empty 10 cc syringe and aspiration was attempted. 3.8 cc of serous straw colored fluid was removed from the RIGHT knee.  The the Synvisc 6 ml injection was attached to the needle and injecte

## 2019-03-15 ENCOUNTER — MED REC SCAN ONLY (OUTPATIENT)
Dept: NEUROLOGY | Facility: CLINIC | Age: 74
End: 2019-03-15

## 2019-03-15 RX ADMIN — TRIAMCINOLONE ACETONIDE 40 MG: 40 INJECTION, SUSPENSION INTRA-ARTICULAR; INTRAMUSCULAR at 07:49:00

## 2019-03-15 RX ADMIN — LIDOCAINE HYDROCHLORIDE 5 ML: 10 INJECTION, SOLUTION INFILTRATION; PERINEURAL at 07:46:00

## 2019-03-15 RX ADMIN — LIDOCAINE HYDROCHLORIDE 4 ML: 10 INJECTION, SOLUTION INFILTRATION; PERINEURAL at 07:43:00

## 2019-03-15 RX ADMIN — LIDOCAINE HYDROCHLORIDE 4 ML: 10 INJECTION, SOLUTION INFILTRATION; PERINEURAL at 07:45:00

## 2019-03-15 RX ADMIN — LIDOCAINE HYDROCHLORIDE 5 ML: 10 INJECTION, SOLUTION INFILTRATION; PERINEURAL at 07:47:00

## 2019-03-15 RX ADMIN — TRIAMCINOLONE ACETONIDE 40 MG: 40 INJECTION, SUSPENSION INTRA-ARTICULAR; INTRAMUSCULAR at 07:48:00

## 2019-03-25 ENCOUNTER — TELEPHONE (OUTPATIENT)
Dept: CARDIOLOGY CLINIC | Facility: CLINIC | Age: 74
End: 2019-03-25

## 2019-03-25 NOTE — TELEPHONE ENCOUNTER
Pt requesting refill for rx:metoprolol Tartrate, requesting new script for 90 day supply, pls call at: [phone], or [phone],TFTAYW.     Current Outpatient Medications:   i•  metoprolol Tartrate 25 MG Oral Tab, Take 1 tablet (25 mg total) by mouth 2

## 2019-03-26 ENCOUNTER — TELEPHONE (OUTPATIENT)
Dept: CARDIOLOGY CLINIC | Facility: CLINIC | Age: 74
End: 2019-03-26

## 2019-03-26 NOTE — TELEPHONE ENCOUNTER
CARD ECHO 2D DOPPLER (CPT=93306  Dx: Non-rheumatic mitral regurgitation [I34.0 (ICD-10-CM)]  Echocardiogram in 6 months just prior to follow-up visit OV 5/28/19

## 2019-03-27 ENCOUNTER — TELEPHONE (OUTPATIENT)
Dept: NEUROLOGY | Facility: CLINIC | Age: 74
End: 2019-03-27

## 2019-03-27 ENCOUNTER — OFFICE VISIT (OUTPATIENT)
Dept: NEUROLOGY | Facility: CLINIC | Age: 74
End: 2019-03-27
Payer: MEDICARE

## 2019-03-27 VITALS — RESPIRATION RATE: 18 BRPM | HEART RATE: 64 BPM | DIASTOLIC BLOOD PRESSURE: 70 MMHG | SYSTOLIC BLOOD PRESSURE: 112 MMHG

## 2019-03-27 DIAGNOSIS — M48.062 LUMBAR STENOSIS WITH NEUROGENIC CLAUDICATION: ICD-10-CM

## 2019-03-27 DIAGNOSIS — M47.817 DJD (DEGENERATIVE JOINT DISEASE), LUMBOSACRAL: ICD-10-CM

## 2019-03-27 DIAGNOSIS — M47.816 FACET ARTHROPATHY, LUMBAR: ICD-10-CM

## 2019-03-27 DIAGNOSIS — M17.0 BILATERAL PRIMARY OSTEOARTHRITIS OF KNEE: Primary | ICD-10-CM

## 2019-03-27 DIAGNOSIS — M43.17 SPONDYLOLISTHESIS AT L5-S1 LEVEL: ICD-10-CM

## 2019-03-27 PROCEDURE — 99214 OFFICE O/P EST MOD 30 MIN: CPT | Performed by: PHYSICAL MEDICINE & REHABILITATION

## 2019-03-27 RX ORDER — TRAMADOL HYDROCHLORIDE 50 MG/1
50 TABLET ORAL EVERY 6 HOURS PRN
Qty: 120 TABLET | Refills: 0 | Status: SHIPPED | OUTPATIENT
Start: 2019-03-27 | End: 2019-05-07

## 2019-03-27 NOTE — PROGRESS NOTES
[address]  Progress Note    CHIEF COMPLAINT:  Patient presents with:  Knee Pain: LOV 03/14/19 Pt states that the pain is worst now than it was before the injection. Mainly the left knee.        History of Pr SHOULDER WITH ROTATOR CUFF REPAIR Right 5/27/2015    Performed by Dwight Quinteros MD at [place] [address]   • ARTHROSCOPY, SHOULDER, SURGI      2015 rotator cufff repain   • CHOLECYSTECTOMY     • COLONOSCOPY N/A 1/12/2017    Performed by Chaitanya Herndon [id number] [id number]----------------3 [id number]----------------5 ubits Disp: 6 pen Rfl: 2   Rivaroxaban 20 MG Oral Tab Take 1 tablet (20 mg total) by mouth daily with food.  Disp: 30 tablet Rfl: 11   insulin detemir (LEVEMIR FLEXTOU pulses intact. Normal capillary refill. Lungs: Non-labored respirations  Abdomen: No abdominal guarding  Extremities: No lower extremity edema bilaterally   Skin: No lesions noted.    Cognition: alert & oriented x 3, attentive, able to follow 2 step comma Negative Final   • Ascorbic Acid Urine 01/11/2019 Negative  Negative mg/dL Final   • Squamous Epi.  Cells 01/11/2019 Few  /HPF Final   • Hyaline Casts 01/11/2019 9* 0 - 5 /LPF Final   • WBC Urine 01/11/2019 1,424* 0 - 5 /HPF Final   • WBC Clump 01/11/2019 M 11/30/2018 Clear  Clear Final   • Spec Gravity 11/30/2018 1.030  1.002 - 1.035 Final   • pH Urine 11/30/2018 5.0  5.0 - 8.0 Final   • Protein Urine 11/30/2018 Negative  Negative mg/dL Final   • Glucose Urine 11/30/2018 >=500* Negative mg/dL Final   • Keton Absolute 11/30/2018 0.5  0.0 - 1.0 K/UL Final   • Eosinophil Absolute 11/30/2018 0.0  0.0 - 0.7 K/UL Final   • Basophil Absolute 11/30/2018 0.1  0.0 - 0.2 K/UL Final   • POC Glucose  11/30/2018 395* 70 - 99 Final   • POC Glucose  11/30/2018 398* 70 - 99 Fi foraminal narrowing. L2-L3: Mild foraminal narrowing. L3-L4: Mild stenosis. Mild foraminal narrowing. L4-L5: Moderate stenosis. Moderate foraminal narrowing. L5-S1: Mild foraminal narrowing.      Dictated by (CST): Shirley Roque MD on 12/12/2018 at as prognosis at length. The patient does have multiple intra-articular loose bodies throughout her knees.   I have adamantly recommended consultation with orthopedic surgery but the patient would like to forego that and continue with her home exercise prog

## 2019-03-27 NOTE — PATIENT INSTRUCTIONS
1) Continue with your home exercise program  2) Ice the knees 3-4x/day for 20 minutes at a time  3) Tramadol 50 mg every 6 hours as needed for pain  4) Follow up with me in 2 months.  If no improvement, then we can decide on steroid injection vs surgical co

## 2019-03-27 NOTE — TELEPHONE ENCOUNTER
Called [facility] for authorization of approval of BILATERAL knee CSI under ultrasound guidance cpt codes 18077, [id number], .  Talked to Dilan Calderón  who  Initiated request. Reference # [id number] pending approval.  Will  fax clinical notes in the am.

## 2019-03-29 NOTE — TELEPHONE ENCOUNTER
Faxed clinical notes to [facility] for authorization of   Bilateral knee CSI injection pending approval.

## 2019-04-03 NOTE — TELEPHONE ENCOUNTER
Call [facility] to check status on authorization pending case #[id number].  Per Camryn Warren states authorization still pending call back on 4/5/19

## 2019-04-17 NOTE — TELEPHONE ENCOUNTER
Called [employer] to initiate a PA, was transferred to [employer] who handles PAs for this pt. Spoke to Felice at [employer]  who informed me that authorization is pending as clinical notes need to be faxed and reviewed.  Turn around

## 2019-04-24 ENCOUNTER — TELEPHONE (OUTPATIENT)
Dept: CARDIOLOGY CLINIC | Facility: CLINIC | Age: 74
End: 2019-04-24

## 2019-04-24 NOTE — TELEPHONE ENCOUNTER
Charissa Mondragon from [employer] calling for mutual pt, indicates returning [facility] call regarding TTE CPT CODE 38208, cert under auth #[id number] range:4/24/19-7/24/19, no need to cb, thanks.     S/w Carrie's daughter upon her request, explained that Dr. Monalisa Leyden

## 2019-04-24 NOTE — TELEPHONE ENCOUNTER
Sherrill Holstein from [employer] calling for mutual pt, indicates returning [facility] call regarding TTE CPT CODE 33478, cert under auth #[id number] range:4/24/19-7/24/19, no need to cb, thanks.

## 2019-05-03 NOTE — TELEPHONE ENCOUNTER
Called [facility] to check on status of authorization for BILATERAL knee CSI injection. Reference # [id number]. T/t Lele MAR  who states it was approved. Authorization # [id number] with effective dates of 03/27/19 to 09/27/19. NOTE: Will need specific appt. /DOS to compl

## 2019-05-07 ENCOUNTER — OFFICE VISIT (OUTPATIENT)
Dept: FAMILY MEDICINE CLINIC | Facility: CLINIC | Age: 74
End: 2019-05-07
Payer: COMMERCIAL

## 2019-05-07 VITALS
SYSTOLIC BLOOD PRESSURE: 164 MMHG | HEIGHT: 64 IN | TEMPERATURE: 98 F | WEIGHT: 181.81 LBS | DIASTOLIC BLOOD PRESSURE: 102 MMHG | BODY MASS INDEX: 31.04 KG/M2

## 2019-05-07 DIAGNOSIS — N30.00 ACUTE CYSTITIS WITHOUT HEMATURIA: Primary | ICD-10-CM

## 2019-05-07 DIAGNOSIS — L02.93 CARBUNCLE: ICD-10-CM

## 2019-05-07 PROCEDURE — 81003 URINALYSIS AUTO W/O SCOPE: CPT | Performed by: FAMILY MEDICINE

## 2019-05-07 PROCEDURE — 99212 OFFICE O/P EST SF 10 MIN: CPT | Performed by: FAMILY MEDICINE

## 2019-05-07 PROCEDURE — 99213 OFFICE O/P EST LOW 20 MIN: CPT | Performed by: FAMILY MEDICINE

## 2019-05-07 RX ORDER — CEPHALEXIN 500 MG/1
500 CAPSULE ORAL 3 TIMES DAILY
Qty: 30 CAPSULE | Refills: 0 | Status: SHIPPED | OUTPATIENT
Start: 2019-05-07 | End: 2019-05-17

## 2019-05-07 RX ORDER — CIPROFLOXACIN 500 MG/1
500 TABLET, FILM COATED ORAL 2 TIMES DAILY
Qty: 20 TABLET | Refills: 0 | Status: SHIPPED | OUTPATIENT
Start: 2019-05-07 | End: 2019-05-07

## 2019-05-07 NOTE — PROGRESS NOTES
5/7/2019  10:15 AM    Vedia Romberg is a 69year old female.     Chief complaint(s): Patient presents with:  Urinary Frequency: 4-5 x's at night   Mass: small mass on tailbone that has been growing    HPI:     Samanthadea Romberg primary complaint is re MD at [place] [address]   • ARTHROSCOPY, SHOULDER, SURGI      2015 rotator cufff repain   • CHOLECYSTECTOMY     • COLONOSCOPY N/A 1/12/2017    Performed by Jackalyn Duane, MD at [facility] ENDOSCOPY   • ELECTROCARDIOGRAM, COMPLETE  4/21/2014    Scanned to media ta PEN NEEDLE ULTRAFINE) 29G X 12.7MM Does not apply Misc USE WITH INSULIN PEN AS DIRECTED ONCE DAILY Disp: 100 each Rfl: 0   Insulin Lispro (HUMALOG KWIKPEN) 100 UNIT/ML Subcutaneous Solution Pen-injector Inject 3 Units into the skin 3 (three) times daily wi Physical Exam    Constitutional: She appears well-developed and well-nourished.    BP (!) 164/102 (BP Location: Right arm, Patient Position: Sitting, Cuff Size: adult)   Temp 97.6 °F (36.4 °C) (Oral)   Ht 5' 4\" (1.626 m)   Wt 181 lb 12.8 oz (82.5 kg)   B 500 MG Oral Cap 30 capsule 0     Sig: Take 1 capsule (500 mg total) by mouth 3 (three) times daily for 10 days. Pedro Garcia    LABORATORY & ORDERS: Orders Placed This Encounter      POC Urinalysis, Automated Dip without microscopy (PCA and EMMG ONLY) [18887]      U

## 2019-05-20 ENCOUNTER — HOSPITAL ENCOUNTER (OUTPATIENT)
Dept: CV DIAGNOSTICS | Age: 74
Discharge: HOME | End: 2019-05-20
Attending: INTERNAL MEDICINE
Payer: COMMERCIAL

## 2019-05-20 DIAGNOSIS — I34.0 NON-RHEUMATIC MITRAL REGURGITATION: ICD-10-CM

## 2019-05-20 PROCEDURE — 93306 TTE W/DOPPLER COMPLETE: CPT | Performed by: INTERNAL MEDICINE

## 2019-05-31 ENCOUNTER — TELEPHONE (OUTPATIENT)
Dept: CARDIOLOGY CLINIC | Facility: CLINIC | Age: 74
End: 2019-05-31

## 2019-05-31 DIAGNOSIS — I48.20 CHRONIC A-FIB (HCC): Primary | ICD-10-CM

## 2019-05-31 NOTE — TELEPHONE ENCOUNTER
Per Dr. Carlie Gonsalves EF slightly lower than before, he would like pt to have 24 hr holter to assess HR to see if this could be contributing to lower EF.  Of note pt had normal nuc 2018 call  Per Sherin Vanessa from language line #[phone],  Relayed echo result and MB

## 2019-06-03 ENCOUNTER — HOSPITAL ENCOUNTER (OUTPATIENT)
Dept: CV DIAGNOSTICS | Facility: HOSPITAL | Age: 74
Discharge: HOME | End: 2019-06-03
Attending: NURSE PRACTITIONER
Payer: COMMERCIAL

## 2019-06-03 DIAGNOSIS — I48.20 CHRONIC A-FIB (HCC): ICD-10-CM

## 2019-06-03 PROCEDURE — 93227 XTRNL ECG REC<48 HR R&I: CPT | Performed by: NURSE PRACTITIONER

## 2019-06-03 PROCEDURE — 93225 XTRNL ECG REC<48 HRS REC: CPT | Performed by: NURSE PRACTITIONER

## 2019-06-10 ENCOUNTER — OFFICE VISIT (OUTPATIENT)
Dept: FAMILY MEDICINE CLINIC | Facility: CLINIC | Age: 74
End: 2019-06-10
Payer: COMMERCIAL

## 2019-06-10 ENCOUNTER — APPOINTMENT (OUTPATIENT)
Dept: LAB | Age: 74
End: 2019-06-10
Attending: FAMILY MEDICINE
Payer: COMMERCIAL

## 2019-06-10 VITALS
WEIGHT: 181.38 LBS | DIASTOLIC BLOOD PRESSURE: 88 MMHG | TEMPERATURE: 98 F | HEART RATE: 132 BPM | BODY MASS INDEX: 30.96 KG/M2 | SYSTOLIC BLOOD PRESSURE: 138 MMHG | HEIGHT: 64 IN

## 2019-06-10 DIAGNOSIS — E11.65 UNCONTROLLED TYPE 2 DIABETES MELLITUS WITH HYPERGLYCEMIA (HCC): ICD-10-CM

## 2019-06-10 DIAGNOSIS — M17.12 PRIMARY OSTEOARTHRITIS OF LEFT KNEE: ICD-10-CM

## 2019-06-10 DIAGNOSIS — I10 ESSENTIAL HYPERTENSION: ICD-10-CM

## 2019-06-10 DIAGNOSIS — E11.65 UNCONTROLLED TYPE 2 DIABETES MELLITUS WITH HYPERGLYCEMIA (HCC): Primary | ICD-10-CM

## 2019-06-10 DIAGNOSIS — I48.21 PERMANENT ATRIAL FIBRILLATION (HCC): ICD-10-CM

## 2019-06-10 DIAGNOSIS — R13.19 ESOPHAGEAL DYSPHAGIA: ICD-10-CM

## 2019-06-10 PROCEDURE — 36415 COLL VENOUS BLD VENIPUNCTURE: CPT

## 2019-06-10 PROCEDURE — 80053 COMPREHEN METABOLIC PANEL: CPT

## 2019-06-10 PROCEDURE — 83036 HEMOGLOBIN GLYCOSYLATED A1C: CPT

## 2019-06-10 PROCEDURE — 99214 OFFICE O/P EST MOD 30 MIN: CPT | Performed by: FAMILY MEDICINE

## 2019-06-10 PROCEDURE — 99212 OFFICE O/P EST SF 10 MIN: CPT | Performed by: FAMILY MEDICINE

## 2019-06-10 PROCEDURE — 80061 LIPID PANEL: CPT

## 2019-06-10 PROCEDURE — 82043 UR ALBUMIN QUANTITATIVE: CPT

## 2019-06-10 PROCEDURE — 82570 ASSAY OF URINE CREATININE: CPT

## 2019-06-10 RX ORDER — DILTIAZEM HYDROCHLORIDE 120 MG/1
120 TABLET, FILM COATED ORAL DAILY
Qty: 30 TABLET | Refills: 5 | Status: SHIPPED | OUTPATIENT
Start: 2019-06-10 | End: 2019-09-26

## 2019-06-10 NOTE — PROGRESS NOTES
6/10/2019  10:54 AM    Catherine Bond is a 69year old female.     Chief complaint(s): Patient presents with:  Medical Question: patient perfers to speak to Dr regarding her issues  Dysphagia    HPI:     Catherine Bond primary complaint is regarding remains well without no more chest pain, shortness of breath, palpitations. She has had no bleeding issues. And has no new complaints.   She did ask about arthritis medication and I recommended her to take acetaminophen and in addition to her other medica History  Administered            Date(s) Administered    FLU VACC High Dose 65 YRS & Older PRSV Free (43600)                          12/15/2015      FLUAD High Dose 72 yr and older (21232)                          01/09/2018      Influenza             10/ Disp: 180 tablet Rfl: 2   LIZ CONTOUR NEXT TEST In Vitro Strip TEST TWICE DAILY Disp: 100 strip Rfl: 2   Lancets Does not apply Misc Check blood sugar 2 times daily Disp: 200 each Rfl: 3       Allergies:     Other                       Comment:Trees hyperglycemia (hcc)  (primary encounter diagnosis)  Primary osteoarthritis of left knee  Essential hypertension  Permanent atrial fibrillation (hcc)  Esophageal dysphagia    1.  Uncontrolled type 2 diabetes mellitus with hyperglycemia (HCC)  DIABETES A&P MG Oral Tab, Take 1 tablet (4 mg total) by mouth 2 (two) times daily. , Disp: 180 tablet, Rfl: 2  •  LIZ CONTOUR NEXT TEST In Vitro Strip, TEST TWICE DAILY, Disp: 100 strip, Rfl: 2  •  Lancets Does not apply Misc, Check blood sugar 2 times daily, Disp: 20 ORDERS: Orders Placed This Encounter      ** Hemoglobin A1C  [E]      ** Lipid Panel [E]      **CMP  Comp Metabolic Panel (14) [E]      Microalb/Creat Ratio, Random Urine    RECOMMENDATIONS given include: avoid pseudoephedrine or other stimulants/decongest

## 2019-06-11 ENCOUNTER — TELEPHONE (OUTPATIENT)
Dept: CARDIOLOGY CLINIC | Facility: CLINIC | Age: 74
End: 2019-06-11

## 2019-06-11 RX ORDER — METOPROLOL TARTRATE 50 MG/1
TABLET, FILM COATED ORAL
Qty: 270 TABLET | Refills: 1 | Status: SHIPPED | OUTPATIENT
Start: 2019-06-11 | End: 2019-09-26

## 2019-06-11 NOTE — TELEPHONE ENCOUNTER
holter that was done to assess afib control d/t lower EF on recent echo show increased rates. Increase metoprolol to 25mg 1 and 1/2 pills BID and f/u with Dr. Severa Res in few weeks for regular appt [facility].    Per language line Carri Bee # [phone], verified HIPAA

## 2019-06-13 ENCOUNTER — TELEPHONE (OUTPATIENT)
Dept: CARDIOLOGY CLINIC | Facility: CLINIC | Age: 74
End: 2019-06-13

## 2019-06-13 ENCOUNTER — TELEPHONE (OUTPATIENT)
Dept: OTHER | Age: 74
End: 2019-06-13

## 2019-06-13 NOTE — TELEPHONE ENCOUNTER
Received call from [facility] that patient is calling wanting to clarify Metoprolol script. When connecting call with  #[phone], call was dropped. Called patient again but unable to reach patient.  Called cardiology office and informed nurse patient is r

## 2019-06-13 NOTE — TELEPHONE ENCOUNTER
Daughter requesting 90 day supply of medication sent to preferred pharmacy         Current Outpatient Medications:     •  Rivaroxaban 20 MG Oral Tab, Take 1 tablet (20 mg total) by mouth daily with food. , Disp: 30 tablet, Rfl: 11

## 2019-06-13 NOTE — TELEPHONE ENCOUNTER
Unable to reach pt , verified HIPAA, s/w Mariama (DTR) . States her mom does not check messages and we should just call her. Explained test results to her and medication change. She states she is aware of medication at the pharmacy .  Had questions about

## 2019-06-17 NOTE — TELEPHONE ENCOUNTER
rivaroxaban (xarelto) LOV reviewed. No change in this medication. Meets refill protocol criteria. Refill sent.     Anticoagulant Medications  Protocol Criteria:  · Appointment scheduled with Cardiology in the past 6 months or in the next 3 months  · BMP or AGRATIO 0.9 (L) 01/25/2016    ANIONGAP 5 06/10/2019    OSMOCALC 295 06/10/2019     Lab Results   Component Value Date    HGB 13.4 11/30/2018     11/30/2018

## 2019-06-27 RX ORDER — GLIMEPIRIDE 4 MG/1
4 TABLET ORAL 2 TIMES DAILY
Qty: 16 TABLET | Refills: 0 | Status: SHIPPED | OUTPATIENT
Start: 2019-06-27 | End: 2020-02-12

## 2019-06-27 NOTE — TELEPHONE ENCOUNTER
Per pt [place] and forgot her medication. She is asking if she can be prescribed medication to a local pharmacy?        Current Outpatient Medications:  metFORMIN HCl 850 MG Oral Tab TAKE ONE TABLET BY MOUTH BID Disp: 270 table

## 2019-06-27 NOTE — TELEPHONE ENCOUNTER
Sent as an FYI    Both medications recently ordered in June 2019    Short term sent to the pharmacy out of town.     Diabetes Medications  Protocol Criteria:  · Appointment scheduled in the past 6 months or the next 3 months  · A1C < 7.5 in the past 6 month

## 2019-07-15 ENCOUNTER — TELEPHONE (OUTPATIENT)
Dept: CARDIOLOGY CLINIC | Facility: CLINIC | Age: 74
End: 2019-07-15

## 2019-07-15 NOTE — TELEPHONE ENCOUNTER
Pt requesting refill to be faxed to [facility] [address] [facility] UP:[phone] option #3 for fax number. Current Outpatient Medications:     •  Rivaroxaban 20 MG Oral Tab, Take 1 tablet (20 mg total) by mouth daily with food. , Disp: 90 tablet, Rfl: 1

## 2019-09-05 ENCOUNTER — OFFICE VISIT (OUTPATIENT)
Dept: FAMILY MEDICINE CLINIC | Facility: CLINIC | Age: 74
End: 2019-09-05
Payer: COMMERCIAL

## 2019-09-05 VITALS
TEMPERATURE: 98 F | DIASTOLIC BLOOD PRESSURE: 87 MMHG | SYSTOLIC BLOOD PRESSURE: 139 MMHG | WEIGHT: 185.38 LBS | HEART RATE: 103 BPM | BODY MASS INDEX: 31.65 KG/M2 | HEIGHT: 64 IN

## 2019-09-05 DIAGNOSIS — M17.12 PRIMARY OSTEOARTHRITIS OF LEFT KNEE: Primary | ICD-10-CM

## 2019-09-05 DIAGNOSIS — M15.9 PRIMARY OSTEOARTHRITIS INVOLVING MULTIPLE JOINTS: ICD-10-CM

## 2019-09-05 PROCEDURE — 99213 OFFICE O/P EST LOW 20 MIN: CPT | Performed by: FAMILY MEDICINE

## 2019-09-05 RX ORDER — MELOXICAM 15 MG/1
15 TABLET ORAL DAILY
Qty: 90 TABLET | Refills: 1 | Status: SHIPPED | OUTPATIENT
Start: 2019-09-05 | End: 2019-10-07

## 2019-09-05 NOTE — PROGRESS NOTES
9/5/2019  12:16 PM    Jalen Esquivel is a 69year old female.     Chief complaint(s): Patient presents with:  Sweats: Patient states that she sweats and feels itchy   Headache  Knee Pain    HPI:     Jalen Esquivel primary complaint is regarding arth • ELECTROCARDIOGRAM, COMPLETE  4/21/2014    Scanned to media tab   • HYSTERECTOMY  2014    TLH/BSO/ A&P repair/ uteroscral spine fixation   • OTHER SURGICAL HISTORY Right 2012    Arthroscopy   • OTHER SURGICAL HISTORY  1995    Open cholecystectomy      F pen Rfl: 3   Insulin Pen Needle (BD PEN NEEDLE ULTRAFINE) 29G X 12.7MM Does not apply Misc USE WITH INSULIN PEN AS DIRECTED ONCE DAILY Disp: 100 each Rfl: 0   Insulin Pen Needle (BD PEN NEEDLE TERENCE U/F) 32G X 4 MM Does not apply Misc Use as directed daily LABORATORY RESULTS:     EKG / Spirometry : -     Radiology: No results found.      ASSESSMENT/PLAN:   Assessment   Primary osteoarthritis of left knee  (primary encounter diagnosis)  Primary osteoarthritis involving multiple joints    MEDICATIONS:

## 2019-09-16 ENCOUNTER — OFFICE VISIT (OUTPATIENT)
Dept: NEUROLOGY | Facility: CLINIC | Age: 74
End: 2019-09-16
Payer: MEDICARE

## 2019-09-16 ENCOUNTER — TELEPHONE (OUTPATIENT)
Dept: NEUROLOGY | Facility: CLINIC | Age: 74
End: 2019-09-16

## 2019-09-16 VITALS
HEIGHT: 64 IN | HEART RATE: 81 BPM | WEIGHT: 185 LBS | BODY MASS INDEX: 31.58 KG/M2 | RESPIRATION RATE: 18 BRPM | DIASTOLIC BLOOD PRESSURE: 80 MMHG | SYSTOLIC BLOOD PRESSURE: 122 MMHG

## 2019-09-16 DIAGNOSIS — G89.29 CHRONIC PAIN OF LEFT KNEE: Primary | ICD-10-CM

## 2019-09-16 DIAGNOSIS — M23.42 BODIES, LOOSE, JOINT, KNEE, LEFT: ICD-10-CM

## 2019-09-16 DIAGNOSIS — M25.562 CHRONIC PAIN OF LEFT KNEE: Primary | ICD-10-CM

## 2019-09-16 DIAGNOSIS — M25.462 EFFUSION OF LEFT KNEE JOINT: ICD-10-CM

## 2019-09-16 PROCEDURE — 99214 OFFICE O/P EST MOD 30 MIN: CPT | Performed by: PHYSICAL MEDICINE & REHABILITATION

## 2019-09-16 NOTE — TELEPHONE ENCOUNTER
Called [facility] for authorization of approval for LEFT knee arthrocentesis under ultrasound guidance, CPT Code N0679664, [id number] . Will call in the a.m.    Pending approval

## 2019-09-16 NOTE — TELEPHONE ENCOUNTER
Pt states she needs a refill on her medication     metFORMIN HCl 850 MG Oral Tab    Also states she is only receiving 4 pens of her insulin and needs more insulin to be sent in. Pt states she is out of both medications. Please Advise.

## 2019-09-16 NOTE — PROGRESS NOTES
[address]  Progress Note    CHIEF COMPLAINT:  Patient presents with:  Knee Pain: LOV 03/27/19 Pt states that the knee pain is still bad and they would like to discuss the next steps which is surgery       H cufff repain   • CHOLECYSTECTOMY     • COLONOSCOPY N/A 1/12/2017    Performed by Esther Mackenzie MD at [facility] ENDOSCOPY   • ELECTROCARDIOGRAM, COMPLETE  4/21/2014    Scanned to media tab   • HYSTERECTOMY  2014    TLH/BSO/ A&P repair/ uteroscral spine fixati Insulin Lispro (HUMALOG KWIKPEN) 100 UNIT/ML Subcutaneous Solution Pen-injector Inject 3 Units into the skin 3 (three) times daily with meals.  Sliding scale with meals only:175-200 sugar--------2 [id number]----------------1 [id number]---------------- lateral patellar facets of the left knee, left peds anserine bursa.   ROM: Full active ROM in flexion and extension  Strength: 5/5 in all myotomes of the BILATERAL lower extremities   Sensation: Intact to light touch in all dermatomes of the lower extremiti AST 06/10/2019 18  15 - 37 U/L Final   • Alkaline Phosphatase 06/10/2019 106  55 - 142 U/L Final   • Bilirubin, Total 06/10/2019 0.4  0.1 - 2.0 mg/dL Final   • Total Protein 06/10/2019 7.6  6.4 - 8.2 g/dL Final   • Albumin 06/10/2019 3.5  3.4 - 5.0 g/dL Fi compartment. The lateral compartment is maintained. Tiny tricompartmental osteophytes. No acute fracture or dislocation. There is severe narrowing of the patellofemoral compartment. There is osteopenia. There is no   acute fracture or dislocation.  Full-thi Jacqueline Garcia

## 2019-09-17 ENCOUNTER — OFFICE VISIT (OUTPATIENT)
Dept: OPHTHALMOLOGY | Facility: CLINIC | Age: 74
End: 2019-09-17
Payer: COMMERCIAL

## 2019-09-17 DIAGNOSIS — H25.13 AGE-RELATED NUCLEAR CATARACT OF BOTH EYES: ICD-10-CM

## 2019-09-17 DIAGNOSIS — H40.003 GLAUCOMA SUSPECT OF BOTH EYES: Primary | ICD-10-CM

## 2019-09-17 DIAGNOSIS — H43.393 FLOATERS, BILATERAL: ICD-10-CM

## 2019-09-17 DIAGNOSIS — E11.9 TYPE 2 DIABETES MELLITUS WITHOUT RETINOPATHY (HCC): ICD-10-CM

## 2019-09-17 PROCEDURE — 92250 FUNDUS PHOTOGRAPHY W/I&R: CPT | Performed by: OPHTHALMOLOGY

## 2019-09-17 PROCEDURE — 92015 DETERMINE REFRACTIVE STATE: CPT | Performed by: OPHTHALMOLOGY

## 2019-09-17 PROCEDURE — 92014 COMPRE OPH EXAM EST PT 1/>: CPT | Performed by: OPHTHALMOLOGY

## 2019-09-17 NOTE — TELEPHONE ENCOUNTER
Called for [facility] for authorization of approval for LEFT knee arthrocentesis under ultrasound guidance- CPT code 98347,13212,. Automated message states to call [employer] [address] [place] and [address] [address] for authorization.   Called

## 2019-09-17 NOTE — PATIENT INSTRUCTIONS
Glaucoma suspect  Discussed with patient that she is a glaucoma suspect based on increased cupping of the optic nerves in both eyes. Retinal photos taken today to document optic nerves. Glaucoma diagnostic testing ordered.   Will not start medication, bu

## 2019-09-17 NOTE — ASSESSMENT & PLAN NOTE
Discussed with patient that cataracts in both eyes are advanced enough at this time to consider surgery; it would be patient's choice. Discussed options such as surgery or change of glasses RX.   Discussed surgical risks, benefits, alternatives and recover

## 2019-09-17 NOTE — PROGRESS NOTES
Bryon Ferraro is a 69year old female.     HPI:     HPI     Diabetic Eye Exam      Additional comments: Pt has been a diabetic for 8  years  8  years on pills/  3  years on Insulin (currently both)  Pt checks his/her BS once a day   Pt's last blood sug REPAIR;  Surgeon: Tsering Gonzalez MD;  Location: [facility]); shldr arthroscop,part acromioplas (Right, 5/27/2015) (Procedure: ARTHROSCOPY SHOULDER WITH ROTATOR CUFF REPAIR;  Surgeon: Tsering Gonzalez MD;  Location: [facility]); gissel Units into the skin 3 (three) times daily with meals.  Sliding scale with meals only:175-200 sugar--------2 [id number]----------------1 [id number]----------------4 [id number]----------------9 ubits Disp: 6 pen Rfl: 2   Insulin Pen Needle (BD PEN NEEDL vitreous          Fundus Exam       Right Left    Disc Sloping margin, Temporal crescent Sloping margin, Temporal crescent    C/D Ratio 0.7 0.7    Macula Normal- no BDR Normal- no BDR    Vessels Normal Normal    Periphery Normal Normal            Refractio benefits of blood sugar control. Diagnosis and treatment discussed in detail with patient. Floaters, bilateral  No treatment.        Orders Placed This Encounter      *FUTURE OCT  - OU - Both Eyes      *FUTURE VF- OU - Both Eyes      Fundus Photos - OU

## 2019-09-18 NOTE — TELEPHONE ENCOUNTER
Received fax from [facility] advising of approval for LEFT knee arthrocentesis under ultrasound guidance cpt code 71792,48085,. Will inform Nursing.

## 2019-09-18 NOTE — TELEPHONE ENCOUNTER
Please review; protocol failed.        Requested Prescriptions     Pending Prescriptions Disp Refills   • metFORMIN HCl 850 MG Oral Tab 16 tablet 0     Sig: TAKE ONE TABLET BY MOUTH BID   • Insulin Lispro (HUMALOG KWIKPEN) 100 UNIT/ML Subcutaneous Solution

## 2019-09-23 ENCOUNTER — LAB ENCOUNTER (OUTPATIENT)
Dept: LAB | Age: 74
End: 2019-09-23
Attending: FAMILY MEDICINE
Payer: COMMERCIAL

## 2019-09-23 ENCOUNTER — OFFICE VISIT (OUTPATIENT)
Dept: FAMILY MEDICINE CLINIC | Facility: CLINIC | Age: 74
End: 2019-09-23
Payer: COMMERCIAL

## 2019-09-23 VITALS
WEIGHT: 183 LBS | HEART RATE: 63 BPM | HEIGHT: 64 IN | DIASTOLIC BLOOD PRESSURE: 98 MMHG | TEMPERATURE: 98 F | BODY MASS INDEX: 31.24 KG/M2 | SYSTOLIC BLOOD PRESSURE: 136 MMHG

## 2019-09-23 DIAGNOSIS — E11.65 UNCONTROLLED TYPE 2 DIABETES MELLITUS WITH HYPERGLYCEMIA (HCC): ICD-10-CM

## 2019-09-23 DIAGNOSIS — Z01.818 PREOPERATIVE CLEARANCE: ICD-10-CM

## 2019-09-23 DIAGNOSIS — Z01.812 PRE-OPERATIVE LABORATORY EXAMINATION: ICD-10-CM

## 2019-09-23 DIAGNOSIS — Z00.00 ROUTINE GENERAL MEDICAL EXAMINATION AT A HEALTH CARE FACILITY: Primary | ICD-10-CM

## 2019-09-23 DIAGNOSIS — M17.12 PRIMARY OSTEOARTHRITIS OF LEFT KNEE: Primary | ICD-10-CM

## 2019-09-23 LAB
ALBUMIN SERPL-MCNC: 3.9 G/DL (ref 3.4–5)
ALBUMIN/GLOB SERPL: 1 {RATIO} (ref 1–2)
ALP LIVER SERPL-CCNC: 125 U/L (ref 55–142)
ALT SERPL-CCNC: 26 U/L (ref 13–56)
ANION GAP SERPL CALC-SCNC: 8 MMOL/L (ref 0–18)
APTT PPP: 26.9 SECONDS (ref 23.2–35.3)
AST SERPL-CCNC: 11 U/L (ref 15–37)
BASOPHILS # BLD AUTO: 0.07 X10(3) UL (ref 0–0.2)
BASOPHILS NFR BLD AUTO: 1 %
BILIRUB SERPL-MCNC: 0.3 MG/DL (ref 0.1–2)
BUN BLD-MCNC: 23 MG/DL (ref 7–18)
BUN/CREAT SERPL: 30.3 (ref 10–20)
CALCIUM BLD-MCNC: 10.3 MG/DL (ref 8.5–10.1)
CHLORIDE SERPL-SCNC: 108 MMOL/L (ref 98–112)
CO2 SERPL-SCNC: 26 MMOL/L (ref 21–32)
CREAT BLD-MCNC: 0.76 MG/DL (ref 0.55–1.02)
DEPRECATED RDW RBC AUTO: 44.7 FL (ref 35.1–46.3)
EOSINOPHIL # BLD AUTO: 0.19 X10(3) UL (ref 0–0.7)
EOSINOPHIL NFR BLD AUTO: 2.7 %
ERYTHROCYTE [DISTWIDTH] IN BLOOD BY AUTOMATED COUNT: 13.5 % (ref 11–15)
EST. AVERAGE GLUCOSE BLD GHB EST-MCNC: 203 MG/DL (ref 68–126)
GLOBULIN PLAS-MCNC: 3.9 G/DL (ref 2.8–4.4)
GLUCOSE BLD-MCNC: 68 MG/DL (ref 70–99)
GLUCOSE BLOOD: 99
HBA1C MFR BLD HPLC: 8.7 % (ref ?–5.7)
HCT VFR BLD AUTO: 41.5 % (ref 35–48)
HGB BLD-MCNC: 13.9 G/DL (ref 12–16)
IMM GRANULOCYTES # BLD AUTO: 0.01 X10(3) UL (ref 0–1)
IMM GRANULOCYTES NFR BLD: 0.1 %
INR BLD: 1.01 (ref 0.9–1.2)
LYMPHOCYTES # BLD AUTO: 2 X10(3) UL (ref 1–4)
LYMPHOCYTES NFR BLD AUTO: 28.9 %
M PROTEIN MFR SERPL ELPH: 7.8 G/DL (ref 6.4–8.2)
MCH RBC QN AUTO: 30.2 PG (ref 26–34)
MCHC RBC AUTO-ENTMCNC: 33.5 G/DL (ref 31–37)
MCV RBC AUTO: 90 FL (ref 80–100)
MONOCYTES # BLD AUTO: 0.59 X10(3) UL (ref 0.1–1)
MONOCYTES NFR BLD AUTO: 8.5 %
NEUTROPHILS # BLD AUTO: 4.05 X10 (3) UL (ref 1.5–7.7)
NEUTROPHILS # BLD AUTO: 4.05 X10(3) UL (ref 1.5–7.7)
NEUTROPHILS NFR BLD AUTO: 58.8 %
OSMOLALITY SERPL CALC.SUM OF ELEC: 296 MOSM/KG (ref 275–295)
PATIENT FASTING: YES
PLATELET # BLD AUTO: 240 10(3)UL (ref 150–450)
POTASSIUM SERPL-SCNC: 4.2 MMOL/L (ref 3.5–5.1)
PROTHROMBIN TIME: 13.1 SECONDS (ref 11.8–14.5)
RBC # BLD AUTO: 4.61 X10(6)UL (ref 3.8–5.3)
SODIUM SERPL-SCNC: 142 MMOL/L (ref 136–145)
TEST STRIP LOT #: NORMAL NUMERIC
WBC # BLD AUTO: 6.9 X10(3) UL (ref 4–11)

## 2019-09-23 PROCEDURE — 93005 ELECTROCARDIOGRAM TRACING: CPT

## 2019-09-23 PROCEDURE — 82962 GLUCOSE BLOOD TEST: CPT | Performed by: FAMILY MEDICINE

## 2019-09-23 PROCEDURE — 36415 COLL VENOUS BLD VENIPUNCTURE: CPT

## 2019-09-23 PROCEDURE — 99214 OFFICE O/P EST MOD 30 MIN: CPT | Performed by: FAMILY MEDICINE

## 2019-09-23 PROCEDURE — 85730 THROMBOPLASTIN TIME PARTIAL: CPT

## 2019-09-23 PROCEDURE — 85025 COMPLETE CBC W/AUTO DIFF WBC: CPT

## 2019-09-23 PROCEDURE — 83036 HEMOGLOBIN GLYCOSYLATED A1C: CPT

## 2019-09-23 PROCEDURE — 85610 PROTHROMBIN TIME: CPT

## 2019-09-23 PROCEDURE — 36416 COLLJ CAPILLARY BLOOD SPEC: CPT | Performed by: FAMILY MEDICINE

## 2019-09-23 PROCEDURE — 93010 ELECTROCARDIOGRAM REPORT: CPT | Performed by: FAMILY MEDICINE

## 2019-09-23 PROCEDURE — 80053 COMPREHEN METABOLIC PANEL: CPT

## 2019-09-23 NOTE — PROGRESS NOTES
9/23/2019  9:24 AM    Trudy Ramos is a 69year old female. Chief complaint(s): Patient presents with:  Pre-Op Exam: Dr. Adelina Singh    HPI:     Trudy Ramos primary complaint is regarding as above.      Patient is a 59-year-old female with long Family History   Problem Relation Age of Onset   • Dementia Mother 80        Alzheimer's Disease   • Cancer Brother 58        Stomach cancer   • Diabetes Brother    • Glaucoma Neg    • Macular degeneration Neg       Social History: Social History    [facility] (LEVEMIR FLEXTOUCH) 100 UNIT/ML Subcutaneous Solution Pen-injector Inject 28 Units into the skin nightly.  Disp: 12 pen Rfl: 3   Insulin Pen Needle (BD PEN NEEDLE ULTRAFINE) 29G X 12.7MM Does not apply Misc USE WITH INSULIN PEN AS DIRECTED ONCE DAILY Disp: Radiology: No results found.      ASSESSMENT/PLAN:   Assessment   Primary osteoarthritis of left knee  (primary encounter diagnosis)  Preoperative clearance  Uncontrolled type 2 diabetes mellitus with hyperglycemia (hcc)    MEDICATIONS:   CPM        LAB

## 2019-09-25 NOTE — PROGRESS NOTES
With [employer] #[id number], advised Dr Candace Holcomb note and verbalized understanding. OV made for tomorrow 9/26/19.       Notes recorded by Lam Pearson MD on 9/23/2019 at 7:22 PM CDT  Please call patient to set up a follow up appointment due to abnormal

## 2019-09-26 ENCOUNTER — OFFICE VISIT (OUTPATIENT)
Dept: FAMILY MEDICINE CLINIC | Facility: CLINIC | Age: 74
End: 2019-09-26
Payer: COMMERCIAL

## 2019-09-26 VITALS
HEART RATE: 112 BPM | HEIGHT: 64 IN | TEMPERATURE: 99 F | DIASTOLIC BLOOD PRESSURE: 79 MMHG | BODY MASS INDEX: 31.24 KG/M2 | WEIGHT: 183 LBS | SYSTOLIC BLOOD PRESSURE: 128 MMHG

## 2019-09-26 DIAGNOSIS — E11.65 UNCONTROLLED TYPE 2 DIABETES MELLITUS WITH HYPERGLYCEMIA (HCC): Primary | ICD-10-CM

## 2019-09-26 PROCEDURE — 99213 OFFICE O/P EST LOW 20 MIN: CPT | Performed by: FAMILY MEDICINE

## 2019-09-26 RX ORDER — METOPROLOL TARTRATE 50 MG/1
TABLET, FILM COATED ORAL
Qty: 270 TABLET | Refills: 1 | Status: SHIPPED | OUTPATIENT
Start: 2019-09-26 | End: 2020-01-21

## 2019-09-26 RX ORDER — DILTIAZEM HYDROCHLORIDE 120 MG/1
120 TABLET, FILM COATED ORAL DAILY
Qty: 30 TABLET | Refills: 5 | Status: SHIPPED | OUTPATIENT
Start: 2019-09-26 | End: 2020-01-21

## 2019-09-26 NOTE — PROGRESS NOTES
9/26/2019  1:48 PM    Luis Enrique Hardy is a 69year old female. Chief complaint(s): Patient presents with:  Test Results: discuss abnormal HgbA1c    HPI:     Luis Enrique Hardy primary complaint is regarding uncontrol diabetes.      Patient Aries lobato Type I (juvenile type) diabetes mellitus without mention of complication, not stated as uncontrolled    • Unspecified essential hypertension    • Visual floaters 2009      Past Surgical History:   Procedure Laterality Date   • ARTHROSCOPY SHOULDER WITH ROT tabs twice a day Disp: 270 tablet Rfl: 1   metFORMIN HCl 850 MG Oral Tab TAKE ONE TABLET BY MOUTH BID Disp: 180 tablet Rfl: 1   Insulin Lispro (HUMALOG KWIKPEN) 100 UNIT/ML Subcutaneous Solution Pen-injector Inject 3 Units into the skin 3 (three) times jose j Pulse 112   Temp 99 °F (37.2 °C) (Oral)   Ht 5' 4\" (1.626 m)   Wt 183 lb (83 kg)   Breastfeeding? No   BMI 31.41 kg/m²    HENT:   Head: Normocephalic. Eyes: Conjunctivae are normal.   Neck: Neck supple. Cardiovascular: Normal rate.     Pulmonary/Chest: UNIT/ML Subcutaneous Solution Pen-injector, Inject 28 Units into the skin nightly., Disp: 12 pen, Rfl: 3  •  Insulin Pen Needle (BD PEN NEEDLE ULTRAFINE) 29G X 12.7MM Does not apply Misc, USE WITH INSULIN PEN AS DIRECTED ONCE DAILY, Disp: 100 each, Rfl: 0

## 2019-09-27 ENCOUNTER — TELEPHONE (OUTPATIENT)
Dept: FAMILY MEDICINE CLINIC | Facility: CLINIC | Age: 74
End: 2019-09-27

## 2019-09-27 NOTE — TELEPHONE ENCOUNTER
Spoke with patient and daughter (TRUE verified). After reviewing the chart, explained metoprolol was a prescription refill. No changes in directions. They expressed understanding.  They indicated Dr. Rubi Shook wanted patient to check blood sugars over the nex

## 2019-09-27 NOTE — TELEPHONE ENCOUNTER
Pt is calling asking why was she prescribed medication Metoprolol Tartrate 50 MG Oral Tab. Please advise.

## 2019-09-30 RX ORDER — BLOOD SUGAR DIAGNOSTIC
STRIP MISCELLANEOUS
Qty: 200 STRIP | Refills: 0 | Status: CANCELLED | OUTPATIENT
Start: 2019-09-30

## 2019-09-30 RX ORDER — BLOOD SUGAR DIAGNOSTIC
STRIP MISCELLANEOUS
Qty: 200 STRIP | Refills: 3 | Status: SHIPPED | OUTPATIENT
Start: 2019-09-30 | End: 2020-06-17

## 2019-09-30 NOTE — TELEPHONE ENCOUNTER
Patient states she is out of her test strips     Current Outpatient Medications:                                                              LIZ CONTOUR NEXT TEST In Vitro Strip TEST TWICE DAILY Disp: 100 strip Rfl: 2

## 2019-10-01 ENCOUNTER — OFFICE VISIT (OUTPATIENT)
Dept: CARDIOLOGY CLINIC | Facility: CLINIC | Age: 74
End: 2019-10-01
Payer: COMMERCIAL

## 2019-10-01 VITALS
BODY MASS INDEX: 31.58 KG/M2 | DIASTOLIC BLOOD PRESSURE: 66 MMHG | HEIGHT: 64 IN | HEART RATE: 62 BPM | SYSTOLIC BLOOD PRESSURE: 106 MMHG | WEIGHT: 185 LBS

## 2019-10-01 DIAGNOSIS — I48.91 ATRIAL FIBRILLATION, UNSPECIFIED TYPE (HCC): Primary | ICD-10-CM

## 2019-10-01 PROCEDURE — 99214 OFFICE O/P EST MOD 30 MIN: CPT | Performed by: NURSE PRACTITIONER

## 2019-10-01 NOTE — PATIENT INSTRUCTIONS
1. Increase metoprolol to 100mg twice daily  2. Repeat echo in nov  3. May proceed with surgery  4.  Follow up with Dr. Ruben Gentile after echo

## 2019-10-01 NOTE — PROGRESS NOTES
Jeferson Jewell is a 69year old female. Patient presents with:  Surgical/procedure Clearance: SX will be performed 10/7, L knee     HPI:   Patient comes in today for follow-up prior to arthroscopic knee.  She sees Dr. Gisela Cardenas she has a history of atrial fib (20 mg total) by mouth daily with food. Disp: 90 tablet Rfl: 1   glimepiride 4 MG Oral Tab Take 1 tablet (4 mg total) by mouth 2 (two) times daily.  Disp: 16 tablet Rfl: 0   insulin detemir (LEVEMIR FLEXTOUCH) 100 UNIT/ML Subcutaneous Solution Pen-injector no apparent distress  SKIN: no rashes,no suspicious lesions  HEENT: atraumatic, normocephalic,ears and throat are clear  NECK: supple,no adenopathy,no bruits  LUNGS: clear to auscultation  CARDIO: Irregular rhythm  GI: good BS's,no masses, HSM or tendernes

## 2019-10-01 NOTE — TELEPHONE ENCOUNTER
Refill passed per [facility], [facility] protocol.   Refill Protocol Appointment Criteria  · Appointment scheduled in the past 12 months or in the next 3 months  Recent Outpatient Visits            4 days ago Uncontrolled type 2 diabetes mellitus with hyperglycemi

## 2019-10-02 ENCOUNTER — TELEPHONE (OUTPATIENT)
Dept: CARDIOLOGY CLINIC | Facility: CLINIC | Age: 74
End: 2019-10-02

## 2019-10-02 NOTE — TELEPHONE ENCOUNTER
Review chart for stroke hx, none found. Verified HIPAA and s/w pt dtr Mariama, no hx stroke. May hold xarelto for 2 days prior to knee procedure and rstart ASAP confirm with with ortho MD. States understanding.

## 2019-10-02 NOTE — TELEPHONE ENCOUNTER
CARD ECHO 2D DOPPLER (CPT=93306) [[id number]]  Order #: [id number] FUTURE   Priority: Routine  Class: EHV - No RFL   Resulting Agency: [facility]  Test ID: [id number]  Future Order Information    Expires on:10/01/2020            Expected by:10/01/2019   (

## 2019-10-03 NOTE — TELEPHONE ENCOUNTER
Attempted to call pt, spoke w/ daughter, Heather Osorio (under mohsen), informed her that per pt's insurance, no PA is required for test and she may proceed w/ test.  She verbalized understanding.

## 2019-10-03 NOTE — TELEPHONE ENCOUNTER
Called [employer] to initiate a PA for echocaridogram, per agent Nadeem ADEN, no pre-certification is required for test.  No reference number is generated, please use agents name, date and time that PA was completed. Date:10/3/19, time: 1:59pm. ([id number]).

## 2019-10-08 ENCOUNTER — TELEPHONE (OUTPATIENT)
Dept: OPHTHALMOLOGY | Facility: CLINIC | Age: 74
End: 2019-10-08

## 2019-10-08 NOTE — TELEPHONE ENCOUNTER
Camie Holter would like to confirm that Referral and notes have been sent to cataract specialist, as no one has contacted the pt. For scheduling?

## 2019-10-08 NOTE — TELEPHONE ENCOUNTER
[facility] calling, requesting referral packet for cataract consult. Fax# [phone].

## 2019-10-08 NOTE — TELEPHONE ENCOUNTER
refaxed information to [address] [place] [address] ophthalmology. I spoke with Susanne Garcia at their office and she says that she just received the information today. She is scheduled on 11/4/19.

## 2019-10-29 ENCOUNTER — NURSE ONLY (OUTPATIENT)
Dept: OPHTHALMOLOGY | Facility: CLINIC | Age: 74
End: 2019-10-29
Payer: COMMERCIAL

## 2019-10-29 DIAGNOSIS — H40.003 GLAUCOMA SUSPECT OF BOTH EYES: ICD-10-CM

## 2019-10-29 PROCEDURE — 92133 CPTRZD OPH DX IMG PST SGM ON: CPT | Performed by: OPHTHALMOLOGY

## 2019-10-29 PROCEDURE — 92083 EXTENDED VISUAL FIELD XM: CPT | Performed by: OPHTHALMOLOGY

## 2019-10-29 NOTE — PROGRESS NOTES
Aimee Marquez is a 69year old female.     HPI:     HPI     Here for a VF and OCT with no MD.     Last edited by Khadijah Lagos O.T. on 10/29/2019  4:26 PM. (History)        Patient History:  Past Medical History:   Diagnosis Date   • Cataract 2009   • C Social History: Social History    Tobacco Use      Smoking status: Former Smoker        Types: Cigarettes      Smokeless tobacco: Never Used      Tobacco comment: Quit 1990    Alcohol use: No    Drug use: No      Medications:  HYDROcodone-acetaminoph ASSESSMENT/PLAN:     Diagnoses and Plan:     Glaucoma suspect  Normal VF, OCT, OU. No orders of the defined types were placed in this encounter.       Meds This Visit:  Requested Prescriptions      No prescriptions requested or ordered in this encoun

## 2019-10-31 ENCOUNTER — OFFICE VISIT (OUTPATIENT)
Dept: PHYSICAL THERAPY | Facility: HOSPITAL | Age: 74
End: 2019-10-31
Attending: FAMILY MEDICINE
Payer: COMMERCIAL

## 2019-10-31 DIAGNOSIS — S83.282A ACUTE LATERAL MENISCUS TEAR OF LEFT KNEE, INITIAL ENCOUNTER: ICD-10-CM

## 2019-10-31 DIAGNOSIS — M23.42 LOOSE BODY IN KNEE, LEFT KNEE: ICD-10-CM

## 2019-10-31 DIAGNOSIS — S83.242A ACUTE MEDIAL MENISCUS TEAR OF LEFT KNEE, INITIAL ENCOUNTER: ICD-10-CM

## 2019-10-31 PROCEDURE — 97110 THERAPEUTIC EXERCISES: CPT

## 2019-10-31 PROCEDURE — 97161 PT EVAL LOW COMPLEX 20 MIN: CPT

## 2019-10-31 NOTE — PROGRESS NOTES
POST-OP KNEE EVALUATION:   Referring Physician: Dr. Antoinette Price  Diagnosis: Acute medial meniscus tear of left knee, initial encounter (L46.245Z)  Acute lateral meniscus tear of left knee, initial encounter (W22.153H)  Loose body in knee, left knee (M23.42) Pt marked \"no\" for all questions relating to personal safety on intake questionaire    ASSESSMENT:   Sushil Stevenson is a 69year old y/o female who presents to therapy today s/p arthroscopic sx resection of medial and lateral left knee meniscal tears on Patella Mobility/Accessory motion: good patellar left mobility slight restriction at left medial glide    Flexibility: WNL, min, mod, Severe limitations    Hamstrings: R min+; L mod-  Gastroc: R sev; L sev  Piriformis: R mod+; L mod+  Quads: R mod+; L mod Frequency / Duration: Patient will be seen for 1-2 x/week or a total of 10-12 visits over a 90 day period. Treatment will include: Manual Therapy; Therapeutic Exercises; Neuromuscular Re-education;  Therapeutic Activity; Gait Training; Patient education; H

## 2019-11-04 ENCOUNTER — APPOINTMENT (OUTPATIENT)
Dept: PHYSICAL THERAPY | Facility: HOSPITAL | Age: 74
End: 2019-11-04
Attending: FAMILY MEDICINE
Payer: COMMERCIAL

## 2019-11-04 ENCOUNTER — TELEPHONE (OUTPATIENT)
Dept: PHYSICAL THERAPY | Facility: HOSPITAL | Age: 74
End: 2019-11-04

## 2019-11-06 ENCOUNTER — TELEPHONE (OUTPATIENT)
Dept: PHYSICAL THERAPY | Facility: HOSPITAL | Age: 74
End: 2019-11-06

## 2019-11-12 ENCOUNTER — OFFICE VISIT (OUTPATIENT)
Dept: PHYSICAL THERAPY | Facility: HOSPITAL | Age: 74
End: 2019-11-12
Attending: FAMILY MEDICINE
Payer: COMMERCIAL

## 2019-11-12 PROCEDURE — 97110 THERAPEUTIC EXERCISES: CPT

## 2019-11-12 PROCEDURE — 97140 MANUAL THERAPY 1/> REGIONS: CPT

## 2019-11-12 NOTE — PROGRESS NOTES
Dx: Acute medial meniscus tear of left knee, initial encounter (Z05.132P)  Acute lateral meniscus tear of left knee, initial encounter (N77.763P)  Loose body in knee, left knee (M23.42)  S/p arthroscopic sx resection of medial and lateral meniscal tears  D and independence with gait on uneven surfaces such as grass   · Pt will be independent and compliant with comprehensive HEP to maintain progress achieved in PT  · Patient will be able to ambulate without antalgia or assistive devices and lower TUG score to

## 2019-11-13 ENCOUNTER — APPOINTMENT (OUTPATIENT)
Dept: PHYSICAL THERAPY | Facility: HOSPITAL | Age: 74
End: 2019-11-13
Attending: FAMILY MEDICINE
Payer: COMMERCIAL

## 2019-11-14 ENCOUNTER — OFFICE VISIT (OUTPATIENT)
Dept: PHYSICAL THERAPY | Facility: HOSPITAL | Age: 74
End: 2019-11-14
Attending: FAMILY MEDICINE
Payer: COMMERCIAL

## 2019-11-14 PROCEDURE — 97110 THERAPEUTIC EXERCISES: CPT

## 2019-11-14 NOTE — PROGRESS NOTES
Dx: Acute medial meniscus tear of left knee, initial encounter (F71.949D)  Acute lateral meniscus tear of left knee, initial encounter (J01.211E)  Loose body in knee, left knee (M23.42)  S/p arthroscopic sx resection of medial and lateral meniscal tears  D perform donning and doffing slacks and shoes  · Pt will improve quad strength to 4+/5 to ascend 1 flight of stairs reciprocally with decreased difficulty and improved safety  · Pt will demonstrate increased hip ER/ABD strength to 4+/5 to perform stepping a

## 2019-11-19 ENCOUNTER — OFFICE VISIT (OUTPATIENT)
Dept: PHYSICAL THERAPY | Facility: HOSPITAL | Age: 74
End: 2019-11-19
Attending: FAMILY MEDICINE
Payer: COMMERCIAL

## 2019-11-19 PROCEDURE — 97110 THERAPEUTIC EXERCISES: CPT

## 2019-11-19 NOTE — PROGRESS NOTES
Dx: Acute medial meniscus tear of left knee, initial encounter (S40.128X)  Acute lateral meniscus tear of left knee, initial encounter (Y08.641M)  Loose body in knee, left knee (M23.42)  S/p arthroscopic sx resection of medial and lateral meniscal tears  D lateral incision with pain in this area at end range flexion, closed chain with good tolerance. Slight c/o pain with squats, no increased pain after treatment.      Goals:  8-12 visits  · Pt will improve knee extension ROM to 0 deg to allow proper heel stri

## 2019-11-21 ENCOUNTER — OFFICE VISIT (OUTPATIENT)
Dept: PHYSICAL THERAPY | Facility: HOSPITAL | Age: 74
End: 2019-11-21
Attending: FAMILY MEDICINE
Payer: COMMERCIAL

## 2019-11-21 PROCEDURE — 97140 MANUAL THERAPY 1/> REGIONS: CPT

## 2019-11-21 PROCEDURE — 97110 THERAPEUTIC EXERCISES: CPT

## 2019-11-21 NOTE — PROGRESS NOTES
Dx: Acute medial meniscus tear of left knee, initial encounter (M48.921L)  Acute lateral meniscus tear of left knee, initial encounter (V00.814S)  Loose body in knee, left knee (M23.42)  S/p arthroscopic sx resection of medial and lateral meniscal tears  D massage L distal quad   Manual (4 min):  Scar mobilization of lateral portal Manual Therapy:  -Scar mobilization of lateral portal and patellar mobs Manual (10 min):  Scar mobilization  Patellar mobs  Foam roll L quad and ITB    HEP:  Reviewed, added self

## 2019-12-03 ENCOUNTER — OFFICE VISIT (OUTPATIENT)
Dept: PHYSICAL THERAPY | Facility: HOSPITAL | Age: 74
End: 2019-12-03
Attending: FAMILY MEDICINE
Payer: COMMERCIAL

## 2019-12-03 PROCEDURE — 97140 MANUAL THERAPY 1/> REGIONS: CPT

## 2019-12-03 PROCEDURE — 97110 THERAPEUTIC EXERCISES: CPT

## 2019-12-03 NOTE — PROGRESS NOTES
Dx: Acute medial meniscus tear of left knee, initial encounter (S73.529Y)  Acute lateral meniscus tear of left knee, initial encounter (L50.467E)  Loose body in knee, left knee (M23.42)  S/p arthroscopic sx resection of medial and lateral meniscal tears  D min):  Patellar mobs  Edema massage L thigh   HEP:             Assessment: Progress with closed chain activity limited by knee pain. Significant knee valgus bilaterally alters gait.      Goals:  8-12 visits  · Pt will improve knee extension ROM to 0 deg to

## 2019-12-06 ENCOUNTER — TELEPHONE (OUTPATIENT)
Dept: PHYSICAL THERAPY | Facility: HOSPITAL | Age: 74
End: 2019-12-06

## 2019-12-11 ENCOUNTER — NURSE TRIAGE (OUTPATIENT)
Dept: FAMILY MEDICINE CLINIC | Facility: CLINIC | Age: 74
End: 2019-12-11

## 2019-12-11 DIAGNOSIS — N39.0 URINARY TRACT INFECTION WITHOUT HEMATURIA, SITE UNSPECIFIED: Primary | ICD-10-CM

## 2019-12-11 NOTE — TELEPHONE ENCOUNTER
Patient requesting medication for Uti. States it burns and it feels warm when she pees. Has history of UTI.      Yi speaking   Transferring to triage

## 2019-12-11 NOTE — TELEPHONE ENCOUNTER
Action Requested: Summary for Provider     []  Critical Lab, Recommendations Needed  [x] Need Additional Advice  []   FYI    []   Need Orders  [x] Need Medications Sent to Pharmacy  []  Other     Language Line #[phone]    SUMMARY: Patient requesting Dr. Destiny Ferrell

## 2019-12-12 ENCOUNTER — APPOINTMENT (OUTPATIENT)
Dept: PHYSICAL THERAPY | Facility: HOSPITAL | Age: 74
End: 2019-12-12
Attending: FAMILY MEDICINE
Payer: COMMERCIAL

## 2019-12-17 ENCOUNTER — OFFICE VISIT (OUTPATIENT)
Dept: PHYSICAL THERAPY | Facility: HOSPITAL | Age: 74
End: 2019-12-17
Attending: FAMILY MEDICINE
Payer: COMMERCIAL

## 2019-12-17 PROCEDURE — 97110 THERAPEUTIC EXERCISES: CPT

## 2019-12-17 NOTE — PROGRESS NOTES
Dx: Acute medial meniscus tear of left knee, initial encounter (E22.741Z)  Acute lateral meniscus tear of left knee, initial encounter (U17.179N)  Loose body in knee, left knee (M23.42)  S/p arthroscopic sx resection of medial and lateral meniscal tears  D (blue, yellow, gray)  6\" fwd step up 2x10 (1 UE)  6\" lat step up 2x10 (1 UE)  4\" fwd step down 2x10 (BUEs)  BOSU fwd alt lunge 15x  Tilt board A/P, M/L taps 20x ea  Standing heel raise 2x15  Gastroc stretch on L3 slant x 2 min           Manual Therapy:

## 2019-12-23 ENCOUNTER — OFFICE VISIT (OUTPATIENT)
Dept: PHYSICAL THERAPY | Facility: HOSPITAL | Age: 74
End: 2019-12-23
Attending: FAMILY MEDICINE
Payer: COMMERCIAL

## 2019-12-23 PROCEDURE — 97110 THERAPEUTIC EXERCISES: CPT

## 2019-12-23 NOTE — PROGRESS NOTES
Dx: Acute medial meniscus tear of left knee, initial encounter (K62.786G)  Acute lateral meniscus tear of left knee, initial encounter (B60.289Y)  Loose body in knee, left knee (M23.42)  S/p arthroscopic sx resection of medial and lateral meniscal tears  D improve gait and stair negotiation. Difficulty descending stairs remains.      Goals:  8-12 visits  · Pt will improve knee extension ROM to 0 deg to allow proper heel strike during gait and terminal knee extension in stance  · Pt will improve left knee AROM

## 2019-12-31 ENCOUNTER — APPOINTMENT (OUTPATIENT)
Dept: PHYSICAL THERAPY | Facility: HOSPITAL | Age: 74
End: 2019-12-31
Attending: FAMILY MEDICINE
Payer: COMMERCIAL

## 2020-01-17 NOTE — TELEPHONE ENCOUNTER
Language Line #[phone]    Spoke with daughter Mariann Fears (TRUE verified) and confirmed patient is no longer c/o of any UTI symptoms.

## 2020-01-21 ENCOUNTER — OFFICE VISIT (OUTPATIENT)
Dept: FAMILY MEDICINE CLINIC | Facility: CLINIC | Age: 75
End: 2020-01-21
Payer: COMMERCIAL

## 2020-01-21 VITALS
WEIGHT: 184 LBS | DIASTOLIC BLOOD PRESSURE: 78 MMHG | TEMPERATURE: 99 F | BODY MASS INDEX: 31.41 KG/M2 | HEART RATE: 99 BPM | HEIGHT: 64 IN | SYSTOLIC BLOOD PRESSURE: 146 MMHG

## 2020-01-21 DIAGNOSIS — I10 ESSENTIAL HYPERTENSION: ICD-10-CM

## 2020-01-21 DIAGNOSIS — N30.00 ACUTE CYSTITIS WITHOUT HEMATURIA: Primary | ICD-10-CM

## 2020-01-21 DIAGNOSIS — M17.12 PRIMARY OSTEOARTHRITIS OF LEFT KNEE: ICD-10-CM

## 2020-01-21 LAB
BILIRUBIN: NEGATIVE
GLUCOSE (URINE DIPSTICK): 100 MG/DL
KETONES (URINE DIPSTICK): NEGATIVE MG/DL
MULTISTIX EXPIRATION DATE: NORMAL DATE
MULTISTIX LOT#: NORMAL NUMERIC
NITRITE, URINE: POSITIVE
OCCULT BLOOD: NEGATIVE
PH, URINE: 5.5 (ref 4.5–8)
PROTEIN (URINE DIPSTICK): NEGATIVE MG/DL
SPECIFIC GRAVITY: 1.02 (ref 1–1.03)
URINE-COLOR: YELLOW
UROBILINOGEN,SEMI-QN: 0.2 MG/DL (ref 0–1.9)

## 2020-01-21 PROCEDURE — 81003 URINALYSIS AUTO W/O SCOPE: CPT | Performed by: FAMILY MEDICINE

## 2020-01-21 PROCEDURE — 99214 OFFICE O/P EST MOD 30 MIN: CPT | Performed by: FAMILY MEDICINE

## 2020-01-21 RX ORDER — MELOXICAM 15 MG/1
15 TABLET ORAL DAILY
Qty: 90 TABLET | Refills: 1 | Status: SHIPPED | OUTPATIENT
Start: 2020-01-21 | End: 2021-04-22

## 2020-01-21 RX ORDER — CIPROFLOXACIN 500 MG/1
500 TABLET, FILM COATED ORAL 2 TIMES DAILY
Qty: 20 TABLET | Refills: 0 | Status: SHIPPED | OUTPATIENT
Start: 2020-01-21 | End: 2020-01-31

## 2020-01-21 RX ORDER — DILTIAZEM HYDROCHLORIDE 120 MG/1
120 TABLET, FILM COATED ORAL DAILY
Qty: 30 TABLET | Refills: 5 | Status: SHIPPED | OUTPATIENT
Start: 2020-01-21 | End: 2020-11-13

## 2020-01-21 RX ORDER — TEA TREE OIL 100 %
OIL (ML) TOPICAL
Qty: 180 CAPSULE | Refills: 3 | Status: ON HOLD | OUTPATIENT
Start: 2020-01-21 | End: 2021-06-19

## 2020-01-21 RX ORDER — METOPROLOL TARTRATE 50 MG/1
TABLET, FILM COATED ORAL
Qty: 270 TABLET | Refills: 1 | Status: SHIPPED | OUTPATIENT
Start: 2020-01-21 | End: 2020-11-13

## 2020-01-21 NOTE — PROGRESS NOTES
1/21/2020  11:01 AM    Jeferson Jewell is a 69year old female.     Chief complaint(s): Patient presents with:  Urinary Frequency: wakes up 3-4 x's at night   Knee Pain: left knee pain  Cough    HPI:     Jeferson Jewell primary complaint is regarding as above  And has no new complaints.  She did ask about arthritis medication and I recommended her to take acetaminophen and in addition to her other medications.     HISTORY:  Past Medical History:   Diagnosis Date   • Cataract 2009   • Cholelithiasis    • Cold wit History    Tobacco Use      Smoking status: Former Smoker        Types: Cigarettes      Smokeless tobacco: Never Used      Tobacco comment: Quit 1990    Alcohol use: No    Drug use: No       Immunizations:     Immunization History  Administered (BD PEN NEEDLE TERENCE U/F) 32G X 4 MM Does not apply Misc Use as directed daily 100 each 6   • Lancets Does not apply Misc Check blood sugar 2 times daily 200 each 3   • HYDROcodone-acetaminophen (NORCO) 5-325 MG Oral Tab Take 1-2 tablets by mouth every 6 (s Matias Corcoran, Tabby Edmondson   Results for orders placed or performed in visit on 01/21/20   URINALYSIS, AUTO, W/O SCOPE   Result Value Ref Range    Glucose Urine 100 mg/dL    Bilirubin NEGATIVE Negative    Ketones, UA NEGATIVE Negative mg/dL questions or concerns. Notify the doctor if there is a deterioration or worsening of the midical condition. Also, inform the doctor with any new symptoms or medications' side effects. FOLLOW-UP: Instructed to call if new or worsening symptoms develop.  Irvin Nagy MG Oral Tab 20 tablet 0     Sig: Take 1 tablet (500 mg total) by mouth 2 (two) times daily for 10 days.       RECOMMENDATIONS given include: avoid pseudoephedrine or other stimulants/decongestants in common cold remedies, decrease consumption of alcohol, pe

## 2020-01-22 ENCOUNTER — TELEPHONE (OUTPATIENT)
Dept: FAMILY MEDICINE CLINIC | Facility: CLINIC | Age: 75
End: 2020-01-22

## 2020-01-22 NOTE — TELEPHONE ENCOUNTER
Spoke with [employer] pharmacist and glucosamine is OTC, it is not covered by insurance. Called patient left message to call back.

## 2020-01-22 NOTE — TELEPHONE ENCOUNTER
Patient states she did not receive medication     Misc Natural Products (GLUCOSAMINE   CHONDROITIN VIT D3) Oral Cap    Also states she received medication and she had mentioned to Dr that she did not need any.      Metoprolol Tartrate 50 MG Oral Tab

## 2020-01-24 NOTE — PROGRESS NOTES
Discharge Summary  Pt has attended 8 visits in Physical Therapy. Assessment: Patient attended 8 visits with persistence L lateral knee pain and swelling. At time of last visit, patient reported minimal change in symptoms since surgery.  Patient did

## 2020-02-04 ENCOUNTER — HOSPITAL ENCOUNTER (OUTPATIENT)
Dept: CV DIAGNOSTICS | Facility: HOSPITAL | Age: 75
Discharge: HOME | End: 2020-02-04
Attending: NURSE PRACTITIONER
Payer: COMMERCIAL

## 2020-02-04 DIAGNOSIS — I48.91 ATRIAL FIBRILLATION, UNSPECIFIED TYPE (HCC): ICD-10-CM

## 2020-02-04 PROCEDURE — 93306 TTE W/DOPPLER COMPLETE: CPT | Performed by: NURSE PRACTITIONER

## 2020-02-12 RX ORDER — GLIMEPIRIDE 4 MG/1
TABLET ORAL
Qty: 180 TABLET | Refills: 0 | Status: SHIPPED | OUTPATIENT
Start: 2020-02-12 | End: 2020-06-12

## 2020-02-12 NOTE — TELEPHONE ENCOUNTER
Please review; protocol failed.     Requested Prescriptions     Pending Prescriptions Disp Refills   • GLIMEPIRIDE 4 MG Oral Tab [Pharmacy Med Name: GLIMEPIRIDE 4MG TABLETS] 180 tablet 0     Sig: TAKE 1 TABLET(4 MG) BY MOUTH TWICE DAILY   • BD PEN NEEDLE OR

## 2020-03-23 ENCOUNTER — TELEPHONE (OUTPATIENT)
Dept: OTHER | Age: 75
End: 2020-03-23

## 2020-03-23 RX ORDER — TEMAZEPAM 30 MG/1
30 CAPSULE ORAL NIGHTLY PRN
Qty: 30 CAPSULE | Refills: 0 | Status: SHIPPED | OUTPATIENT
Start: 2020-03-23 | End: 2020-04-08

## 2020-03-23 NOTE — TELEPHONE ENCOUNTER
Patient calling asking for Dr. Justina Cabrera to prescribe her medication to sleep.  She is requesting something other than melatonin as she stated, \"I had a natural pill Dr. Justina Cabrera advised me to take about a year ago, but honestly they did not help and to be

## 2020-04-08 ENCOUNTER — TELEPHONE (OUTPATIENT)
Dept: FAMILY MEDICINE CLINIC | Facility: CLINIC | Age: 75
End: 2020-04-08

## 2020-04-08 RX ORDER — TEMAZEPAM 30 MG/1
30 CAPSULE ORAL NIGHTLY PRN
Qty: 30 CAPSULE | Refills: 0 | Status: SHIPPED | OUTPATIENT
Start: 2020-04-08 | End: 2020-10-09

## 2020-06-06 DIAGNOSIS — I48.91 ATRIAL FIBRILLATION, UNSPECIFIED TYPE (HCC): Primary | ICD-10-CM

## 2020-06-09 ENCOUNTER — APPOINTMENT (OUTPATIENT)
Dept: LAB | Facility: HOSPITAL | Age: 75
End: 2020-06-09
Attending: INTERNAL MEDICINE
Payer: COMMERCIAL

## 2020-06-09 DIAGNOSIS — I48.91 ATRIAL FIBRILLATION, UNSPECIFIED TYPE (HCC): ICD-10-CM

## 2020-06-09 PROCEDURE — 80048 BASIC METABOLIC PNL TOTAL CA: CPT

## 2020-06-09 PROCEDURE — 36415 COLL VENOUS BLD VENIPUNCTURE: CPT

## 2020-06-09 RX ORDER — RIVAROXABAN 20 MG/1
TABLET, FILM COATED ORAL
Qty: 90 TABLET | Refills: 1 | Status: SHIPPED | OUTPATIENT
Start: 2020-06-09 | End: 2021-03-22

## 2020-06-09 NOTE — TELEPHONE ENCOUNTER
Needs appt, cbc good, cmp>yr. Order place. S/w dtr [facility], verified HIPAA, and they will get lab  Done today and appt made with MB.    Anticoagulant Medications  Protocol Criteria:  · Appointment scheduled with Cardiology in the past 6 months or in the n

## 2020-06-12 RX ORDER — GLIMEPIRIDE 4 MG/1
TABLET ORAL
Qty: 180 TABLET | Refills: 0 | Status: SHIPPED | OUTPATIENT
Start: 2020-06-12 | End: 2020-11-13

## 2020-06-17 RX ORDER — BLOOD SUGAR DIAGNOSTIC
STRIP MISCELLANEOUS
Qty: 200 STRIP | Refills: 3 | Status: SHIPPED | OUTPATIENT
Start: 2020-06-17 | End: 2020-11-27

## 2020-06-18 ENCOUNTER — TELEPHONE (OUTPATIENT)
Dept: FAMILY MEDICINE CLINIC | Facility: CLINIC | Age: 75
End: 2020-06-18

## 2020-06-18 NOTE — TELEPHONE ENCOUNTER
Patients daughter states medication was not received by pharmacy.  Asking if it can be re sent     Insulin Pen Needle (BD PEN NEEDLE ORIGINAL U/F) 29G X 12.7MM Does not apply Misc

## 2020-06-18 NOTE — TELEPHONE ENCOUNTER
Asya Wall I called and spoke to daughter and she stated that her mother does not need the needles she needs levemir pen refilled. Per daughter pt injects 25 units .  Asya Wall I have pended the medication for your review and approval. Pt will be out sesar

## 2020-06-18 NOTE — TELEPHONE ENCOUNTER
Daughter Nadir Grissom was called and informed her that the medication has been send to the pharmacy. Thanks

## 2020-06-25 ENCOUNTER — NURSE TRIAGE (OUTPATIENT)
Dept: FAMILY MEDICINE CLINIC | Facility: CLINIC | Age: 75
End: 2020-06-25

## 2020-06-25 ENCOUNTER — VIRTUAL PHONE E/M (OUTPATIENT)
Dept: FAMILY MEDICINE CLINIC | Facility: CLINIC | Age: 75
End: 2020-06-25
Payer: COMMERCIAL

## 2020-06-25 DIAGNOSIS — M54.50 CHRONIC BILATERAL LOW BACK PAIN WITHOUT SCIATICA: ICD-10-CM

## 2020-06-25 DIAGNOSIS — M47.817 DJD (DEGENERATIVE JOINT DISEASE), LUMBOSACRAL: ICD-10-CM

## 2020-06-25 DIAGNOSIS — M25.512 ACUTE PAIN OF BOTH SHOULDERS: ICD-10-CM

## 2020-06-25 DIAGNOSIS — I15.9 SECONDARY HYPERTENSION: Primary | ICD-10-CM

## 2020-06-25 DIAGNOSIS — M25.511 ACUTE PAIN OF BOTH SHOULDERS: ICD-10-CM

## 2020-06-25 DIAGNOSIS — G89.29 CHRONIC BILATERAL LOW BACK PAIN WITHOUT SCIATICA: ICD-10-CM

## 2020-06-25 PROCEDURE — 99214 OFFICE O/P EST MOD 30 MIN: CPT | Performed by: NURSE PRACTITIONER

## 2020-06-25 NOTE — TELEPHONE ENCOUNTER
Action Requested: Summary for Provider     []  Critical Lab, Recommendations Needed  [] Need Additional Advice  []   FYI    []   Need Orders  [] Need Medications Sent to Pharmacy  []  Other     SUMMARY: Patient requesting appt for today for symptoms of shayy

## 2020-06-25 NOTE — PROGRESS NOTES
HPI  Pt calling for telephone visit due to bilat shoulder that started yesterday. Has h/o low back pain and states that her back hurts. Was feeling cold but does not have a thermometer to check temperature. Has a slight headache.  Denies chest pain, sob stated as uncontrolled    • Unspecified essential hypertension    • Visual floaters 2009       .   Past Surgical History:   Procedure Laterality Date   • Arthroscopy, shoulder, surgi      2015 rotator cufff repain   • Arthroscopy, shoulder, surgical; w/rota tobacco: Never Used      Tobacco comment: Quit 1990    Substance and Sexual Activity      Alcohol use: No      Drug use: No      Sexual activity: Not on file    Lifestyle      Physical activity:        Days per week: Not on file        Minutes per session: MG Oral Tab TAKE 1 TABLET DAILY WITH FOOD 90 tablet 1   • temazepam 30 MG Oral Cap Take 1 capsule (30 mg total) by mouth nightly as needed for Sleep.  30 capsule 0   • Misc Natural Products (GLUCOSAMINE CHONDROITIN VIT D3) Oral Cap Take 2 tab po Q day 180 c htn         Relevant Medications    Diclofenac Sodium 1 % Transdermal Gel    Other Relevant Orders    PHYSIATRY - INTERNAL    Acute pain of both shoulders     Diclofenac gel qid  Refer physiatry           Relevant Medications    Diclofenac Sodium 1 % Trans with pt and pt is in agreement. All questions answered. Pt to call with questions or concerns. Encouraged to sign up for My Chart if not already registered.

## 2020-10-08 ENCOUNTER — OFFICE VISIT (OUTPATIENT)
Dept: CARDIOLOGY CLINIC | Facility: CLINIC | Age: 75
End: 2020-10-08
Payer: COMMERCIAL

## 2020-10-08 VITALS
TEMPERATURE: 97 F | HEART RATE: 79 BPM | SYSTOLIC BLOOD PRESSURE: 121 MMHG | HEIGHT: 62.8 IN | WEIGHT: 183.81 LBS | BODY MASS INDEX: 32.57 KG/M2 | DIASTOLIC BLOOD PRESSURE: 80 MMHG

## 2020-10-08 DIAGNOSIS — I48.91 ATRIAL FIBRILLATION, UNSPECIFIED TYPE (HCC): ICD-10-CM

## 2020-10-08 DIAGNOSIS — R53.83 OTHER FATIGUE: Primary | ICD-10-CM

## 2020-10-08 DIAGNOSIS — I34.0 NON-RHEUMATIC MITRAL REGURGITATION: ICD-10-CM

## 2020-10-08 PROCEDURE — 3074F SYST BP LT 130 MM HG: CPT | Performed by: INTERNAL MEDICINE

## 2020-10-08 PROCEDURE — 3079F DIAST BP 80-89 MM HG: CPT | Performed by: INTERNAL MEDICINE

## 2020-10-08 PROCEDURE — 99214 OFFICE O/P EST MOD 30 MIN: CPT | Performed by: INTERNAL MEDICINE

## 2020-10-08 PROCEDURE — 3008F BODY MASS INDEX DOCD: CPT | Performed by: INTERNAL MEDICINE

## 2020-10-08 NOTE — PROGRESS NOTES
[address] [place] [address] NOTE    Lynn Merrill is a 77year old female. Patient presents with: Follow - Up: Here with her granddaughter Yonny Magana. Hx of afib, mitral regurgitation.  With activity has been feeling more fatigued and getting some dyspnea with exe tablet 1   • Insulin Lispro (HUMALOG KWIKPEN) 100 UNIT/ML Subcutaneous Solution Pen-injector Inject 3 Units into the skin 3 (three) times daily with meals.  Sliding scale with meals only:  175-200 sugar--------2 units  201-250----------------4 units  251-30 Onset   • Dementia Mother 80        Alzheimer's Disease   • Cancer Brother 58        Stomach cancer   • Diabetes Brother    • Glaucoma Neg    • Macular degeneration Neg         REVIEW OF SYSTEMS:   GENERAL HEALTH: feels well otherwise  SKIN: denies any unu permanent atrial fibrillation some valve disease who has vague fatigue without angina or heart failure or clear evidence of accelerated A. fib rates. With history will check labs to look for other causes.   Patient will work on walking more to see if this

## 2020-10-08 NOTE — PATIENT INSTRUCTIONS
Fasting blood test    Try walking more to build endurance    If no improvement echo before the end of the year or just prior to follow-up visit

## 2020-10-08 NOTE — TELEPHONE ENCOUNTER
Patient seen in clinic today for follow up with Dr. Phyllistine Burkitt. She is requesting refill for temazepam to help her sleep. Routed refill request for EM RN Triage for follow up. Thanks.

## 2020-10-09 RX ORDER — TEMAZEPAM 30 MG/1
30 CAPSULE ORAL NIGHTLY PRN
Qty: 30 CAPSULE | Refills: 0 | Status: SHIPPED | OUTPATIENT
Start: 2020-10-09 | End: 2021-04-22

## 2020-10-13 ENCOUNTER — LAB ENCOUNTER (OUTPATIENT)
Dept: LAB | Facility: HOSPITAL | Age: 75
End: 2020-10-13
Attending: INTERNAL MEDICINE
Payer: COMMERCIAL

## 2020-10-13 DIAGNOSIS — I48.91 ATRIAL FIBRILLATION, UNSPECIFIED TYPE (HCC): ICD-10-CM

## 2020-10-13 DIAGNOSIS — I34.0 NON-RHEUMATIC MITRAL REGURGITATION: ICD-10-CM

## 2020-10-13 DIAGNOSIS — R53.83 OTHER FATIGUE: ICD-10-CM

## 2020-10-13 PROCEDURE — 80053 COMPREHEN METABOLIC PANEL: CPT

## 2020-10-13 PROCEDURE — 36415 COLL VENOUS BLD VENIPUNCTURE: CPT

## 2020-10-13 PROCEDURE — 85025 COMPLETE CBC W/AUTO DIFF WBC: CPT

## 2020-10-13 PROCEDURE — 80061 LIPID PANEL: CPT

## 2020-10-13 PROCEDURE — 84443 ASSAY THYROID STIM HORMONE: CPT

## 2020-11-13 ENCOUNTER — OFFICE VISIT (OUTPATIENT)
Dept: FAMILY MEDICINE CLINIC | Facility: CLINIC | Age: 75
End: 2020-11-13
Payer: COMMERCIAL

## 2020-11-13 VITALS
HEIGHT: 62.8 IN | SYSTOLIC BLOOD PRESSURE: 144 MMHG | BODY MASS INDEX: 31.18 KG/M2 | WEIGHT: 176 LBS | HEART RATE: 98 BPM | DIASTOLIC BLOOD PRESSURE: 90 MMHG

## 2020-11-13 DIAGNOSIS — R35.0 URINE FREQUENCY: ICD-10-CM

## 2020-11-13 DIAGNOSIS — I10 ESSENTIAL HYPERTENSION: ICD-10-CM

## 2020-11-13 DIAGNOSIS — N76.1 SUBACUTE VAGINITIS: ICD-10-CM

## 2020-11-13 DIAGNOSIS — E11.65 UNCONTROLLED TYPE 2 DIABETES MELLITUS WITH HYPERGLYCEMIA (HCC): Primary | ICD-10-CM

## 2020-11-13 PROCEDURE — 3077F SYST BP >= 140 MM HG: CPT | Performed by: FAMILY MEDICINE

## 2020-11-13 PROCEDURE — 83036 HEMOGLOBIN GLYCOSYLATED A1C: CPT | Performed by: FAMILY MEDICINE

## 2020-11-13 PROCEDURE — 3080F DIAST BP >= 90 MM HG: CPT | Performed by: FAMILY MEDICINE

## 2020-11-13 PROCEDURE — 99072 ADDL SUPL MATRL&STAF TM PHE: CPT | Performed by: FAMILY MEDICINE

## 2020-11-13 PROCEDURE — 36416 COLLJ CAPILLARY BLOOD SPEC: CPT | Performed by: FAMILY MEDICINE

## 2020-11-13 PROCEDURE — 3008F BODY MASS INDEX DOCD: CPT | Performed by: FAMILY MEDICINE

## 2020-11-13 PROCEDURE — 99214 OFFICE O/P EST MOD 30 MIN: CPT | Performed by: FAMILY MEDICINE

## 2020-11-13 PROCEDURE — 81003 URINALYSIS AUTO W/O SCOPE: CPT | Performed by: FAMILY MEDICINE

## 2020-11-13 RX ORDER — INSULIN DETEMIR 100 [IU]/ML
25 INJECTION, SOLUTION SUBCUTANEOUS NIGHTLY
Qty: 12 PEN | Refills: 3 | Status: SHIPPED | OUTPATIENT
Start: 2020-11-13 | End: 2020-12-15

## 2020-11-13 RX ORDER — INSULIN ASPART 100 [IU]/ML
3 INJECTION, SOLUTION INTRAVENOUS; SUBCUTANEOUS
Qty: 6 DEVICE | Refills: 3 | Status: SHIPPED | OUTPATIENT
Start: 2020-11-13 | End: 2021-04-22

## 2020-11-13 RX ORDER — DILTIAZEM HYDROCHLORIDE 120 MG/1
120 TABLET, FILM COATED ORAL DAILY
Qty: 30 TABLET | Refills: 5 | Status: SHIPPED | OUTPATIENT
Start: 2020-11-13 | End: 2021-02-13

## 2020-11-13 RX ORDER — GLIMEPIRIDE 4 MG/1
4 TABLET ORAL 2 TIMES DAILY
Qty: 180 TABLET | Refills: 1 | Status: SHIPPED | OUTPATIENT
Start: 2020-11-13 | End: 2021-04-22

## 2020-11-13 RX ORDER — METOPROLOL TARTRATE 50 MG/1
50 TABLET, FILM COATED ORAL 2 TIMES DAILY
Qty: 180 TABLET | Refills: 2 | Status: ON HOLD | OUTPATIENT
Start: 2020-11-13 | End: 2021-07-26

## 2020-11-13 RX ORDER — FLUCONAZOLE 150 MG/1
150 TABLET ORAL DAILY
Qty: 3 TABLET | Refills: 0 | Status: SHIPPED | OUTPATIENT
Start: 2020-11-13 | End: 2021-03-22

## 2020-11-13 NOTE — PROGRESS NOTES
11/13/2020  9:55 AM    Drea Skaggs is a 77year old female. Chief complaint(s): Patient presents with:   Other: urinary frequency at night up to 5x for 3 weeks  Other: itching in groin area    HPI:     Drea Skaggs primary complaint is regarding mul to preventative care, her last ophthalmology exam was in , 12 months ago. Opthalmic evaluation have shown none pathology.   Concurrent relative health problems include HTN. Sugar levels having been going up due to steroid injections.       HISTORY:  Past M Brother    • Glaucoma Neg    • Macular degeneration Neg       Social History: Social History    Tobacco Use      Smoking status: Former Smoker        Types: Cigarettes        Quit date:         Years since quittin.      Smokeless tobacco: Never U mouth 2 (two) times daily.  ) 270 tablet 1   • metFORMIN HCl 850 MG Oral Tab TAKE ONE TABLET BY MOUTH  tablet 1   • Insulin Lispro (HUMALOG KWIKPEN) 100 UNIT/ML Subcutaneous Solution Pen-injector Inject 3 Units into the skin 3 (three) times daily wi breath sounds normal.   Genitourinary:    Vaginal discharge (white) and erythema present. There is erythema in the vagina. Lymphadenopathy:   LEs   Skin: No rash noted.        LABORATORY RESULTS:   No results found for: Nany Dial Tab, Take 1 tablet (120 mg total) by mouth daily. , Disp: 30 tablet, Rfl: 5  •  Metoprolol Tartrate 50 MG Oral Tab, Take 1 tablet (50 mg total) by mouth 2 (two) times daily. , Disp: 180 tablet, Rfl: 2  •  fluconazole (DIFLUCAN) 150 MG Oral Tab, Take 1 tablet Pen-injector 12 pen 3     Sig: Inject 25 Units into the skin nightly. • insulin aspart (NOVOLOG) 100 UNIT/ML Subcutaneous Solution 6 Device 3     Sig: Inject 3 Units into the skin 3 (three) times daily before meals.    • glimepiride 4 MG Oral Tab 180 tabl and Elidia 97) [72902]      POC Glycohemoglobin [15709]      Urine Culture, Routine [E]        RECOMMENDATIONS given include: Please, call our office with any questions or concerns.  Notify Dr Artie Morin or the [facility] if there is a deterioration or w glimepiride 4 MG Oral Tab 180 tablet 1     Sig: Take 1 tablet (4 mg total) by mouth 2 (two) times daily. • dilTIAZem HCl 120 MG Oral Tab 30 tablet 5     Sig: Take 1 tablet (120 mg total) by mouth daily.    • Metoprolol Tartrate 50 MG Oral Tab 180 tablet 2

## 2020-11-16 RX ORDER — CIPROFLOXACIN 500 MG/1
500 TABLET, FILM COATED ORAL 2 TIMES DAILY
Qty: 20 TABLET | Refills: 0 | Status: SHIPPED | OUTPATIENT
Start: 2020-11-16 | End: 2020-11-26

## 2020-11-27 ENCOUNTER — TELEPHONE (OUTPATIENT)
Dept: FAMILY MEDICINE CLINIC | Facility: CLINIC | Age: 75
End: 2020-11-27

## 2020-11-27 RX ORDER — BLOOD SUGAR DIAGNOSTIC
STRIP MISCELLANEOUS
Qty: 100 EACH | Refills: 11 | Status: ON HOLD | OUTPATIENT
Start: 2020-11-27 | End: 2021-06-19

## 2020-11-27 RX ORDER — LANCETS 30 GAUGE
EACH MISCELLANEOUS
Qty: 100 EACH | Refills: 11 | Status: ON HOLD | OUTPATIENT
Start: 2020-11-27 | End: 2021-06-19

## 2020-11-27 NOTE — TELEPHONE ENCOUNTER
[facility] pharmacy, patient need CONTOUR NEXT Lancet and it is new to them.  Not is patient med list.

## 2020-12-15 ENCOUNTER — OFFICE VISIT (OUTPATIENT)
Dept: FAMILY MEDICINE CLINIC | Facility: CLINIC | Age: 75
End: 2020-12-15
Payer: COMMERCIAL

## 2020-12-15 VITALS
HEIGHT: 63 IN | SYSTOLIC BLOOD PRESSURE: 152 MMHG | WEIGHT: 176.19 LBS | BODY MASS INDEX: 31.22 KG/M2 | HEART RATE: 103 BPM | DIASTOLIC BLOOD PRESSURE: 91 MMHG

## 2020-12-15 DIAGNOSIS — I10 ESSENTIAL HYPERTENSION: ICD-10-CM

## 2020-12-15 DIAGNOSIS — E11.65 UNCONTROLLED TYPE 2 DIABETES MELLITUS WITH HYPERGLYCEMIA (HCC): Primary | ICD-10-CM

## 2020-12-15 DIAGNOSIS — N30.00 ACUTE CYSTITIS WITHOUT HEMATURIA: ICD-10-CM

## 2020-12-15 DIAGNOSIS — Z12.31 VISIT FOR SCREENING MAMMOGRAM: ICD-10-CM

## 2020-12-15 DIAGNOSIS — I48.21 PERMANENT ATRIAL FIBRILLATION (HCC): ICD-10-CM

## 2020-12-15 PROCEDURE — 99213 OFFICE O/P EST LOW 20 MIN: CPT | Performed by: FAMILY MEDICINE

## 2020-12-15 PROCEDURE — 3077F SYST BP >= 140 MM HG: CPT | Performed by: FAMILY MEDICINE

## 2020-12-15 PROCEDURE — 3008F BODY MASS INDEX DOCD: CPT | Performed by: FAMILY MEDICINE

## 2020-12-15 PROCEDURE — 3080F DIAST BP >= 90 MM HG: CPT | Performed by: FAMILY MEDICINE

## 2020-12-15 RX ORDER — SULFAMETHOXAZOLE AND TRIMETHOPRIM 800; 160 MG/1; MG/1
1 TABLET ORAL 2 TIMES DAILY
Qty: 20 TABLET | Refills: 0 | Status: SHIPPED | OUTPATIENT
Start: 2020-12-15 | End: 2020-12-25

## 2020-12-15 RX ORDER — INSULIN DETEMIR 100 [IU]/ML
INJECTION, SOLUTION SUBCUTANEOUS
Qty: 12 PEN | Refills: 3 | Status: SHIPPED | OUTPATIENT
Start: 2020-12-15 | End: 2021-02-13

## 2020-12-15 NOTE — PROGRESS NOTES
12/15/2020  11:07 AM    Amber Kumar is a 77year old female. Chief complaint(s): Patient presents with:  Diabetes: F/U  Urinary Frequency: wakes up to 4x's at night t urinate     HPI:     Amber Kumar primary complaint is regarding as above. Hypercholesterolemia    • Lipid screening 4/18/2014   • Meniscus tear 2012    Foreign body, meniscus tear   • Osteoporosis screening 11/29/2013    Dexa Scan   • Reflux     Takes OTC meds PRN \"Tums\"   • Type I (juvenile type) diabetes mellitus without men and older (94281)                          01/09/2018      Fluzone Vaccine Medicare ()                          10/08/2013  11/10/2015  12/15/2015      Influenza             10/13/2009  10/08/2013      Influenza Vaccine, Naeem Overall Free Take 1 tablet (15 mg total) by mouth daily.  90 tablet 1   • metFORMIN HCl 850 MG Oral Tab TAKE ONE TABLET BY MOUTH  tablet 1   • Insulin Lispro (HUMALOG KWIKPEN) 100 UNIT/ML Subcutaneous Solution Pen-injector Inject 3 Units into the skin 3 (three) t RESULTS:   No results found for: Dinesh Maki   Results for orders placed or performed in visit on 11/13/20   URINALYSIS, AUTO, W/O SCOPE   Result Value Ref Range    Glucose Urine 500 mg/dL    Bilirubin negative Negat Sulfamethoxazole-TMP -160 MG Oral Tab per tablet, Take 1 tablet by mouth 2 (two) times daily for 10 days. , Disp: 20 tablet, Rfl: 0  •  insulin detemir (LEVEMIR FLEXTOUCH) 100 UNIT/ML Subcutaneous Solution Pen-injector, 30 units qhs and 20 units Q am, sugar--------2 units 201-250----------------4 units 251-300----------------6 units 301-350----------------8 ubits, Disp: 6 pen, Rfl: 1  •  Insulin Pen Needle (BD PEN NEEDLE ULTRAFINE) 29G X 12.7MM Does not apply Misc, USE WITH INSULIN PEN AS DIRECTED ONCE ([facility]    MEDICATIONS:  •  Sulfamethoxazole-TMP -160 MG Oral Tab per tablet, Take 1 tablet by mouth 2 (two) times daily for 10 days. , Disp: 20 tablet, Rfl: 0  •  insulin detemir (LEVEMIR FLEXTOUCH) 100 UNIT/ML Subcutaneous Solution Pen-injector, 30 un meals only: 175-200 sugar--------2 units 201-250----------------4 units 251-300----------------6 units 301-350----------------8 ubits, Disp: 6 pen, Rfl: 1  •  Insulin Pen Needle (BD PEN NEEDLE ULTRAFINE) 29G X 12.7MM Does not apply Misc, USE WITH INSULIN P defined types were placed in this encounter.       Meds This Visit:  Requested Prescriptions     Signed Prescriptions Disp Refills   • Sulfamethoxazole-TMP -160 MG Oral Tab per tablet 20 tablet 0     Sig: Take 1 tablet by mouth 2 (two) times daily for

## 2020-12-21 RX ORDER — PEN NEEDLE, DIABETIC 29 G X1/2"
NEEDLE, DISPOSABLE MISCELLANEOUS
Qty: 100 EACH | Refills: 0 | Status: SHIPPED | OUTPATIENT
Start: 2020-12-21 | End: 2021-08-02

## 2020-12-21 NOTE — TELEPHONE ENCOUNTER
Miguel Smyth from pharmacy [facility]'s calling and states they still have not received the refill for the pin needles for the insulin and patient is out         Please advise   [phone]

## 2020-12-21 NOTE — TELEPHONE ENCOUNTER
•  Insulin Pen Needle (BD PEN NEEDLE ORIGINAL U/F) 29G X 12.7MM Does not apply Misc, USE AS DIRECTED DAILY, Disp: 100 each, Rfl: 0

## 2021-01-12 ENCOUNTER — OFFICE VISIT (OUTPATIENT)
Dept: FAMILY MEDICINE CLINIC | Facility: CLINIC | Age: 76
End: 2021-01-12
Payer: COMMERCIAL

## 2021-01-12 VITALS
HEART RATE: 71 BPM | DIASTOLIC BLOOD PRESSURE: 90 MMHG | WEIGHT: 179 LBS | BODY MASS INDEX: 31.71 KG/M2 | HEIGHT: 63 IN | TEMPERATURE: 98 F | SYSTOLIC BLOOD PRESSURE: 138 MMHG

## 2021-01-12 DIAGNOSIS — E11.65 UNCONTROLLED TYPE 2 DIABETES MELLITUS WITH HYPERGLYCEMIA (HCC): Primary | ICD-10-CM

## 2021-01-12 DIAGNOSIS — N39.490 OVERFLOW INCONTINENCE: ICD-10-CM

## 2021-01-12 LAB
GLUCOSE BLOOD: 236
TEST STRIP LOT #: NORMAL NUMERIC

## 2021-01-12 PROCEDURE — 3080F DIAST BP >= 90 MM HG: CPT | Performed by: FAMILY MEDICINE

## 2021-01-12 PROCEDURE — 36416 COLLJ CAPILLARY BLOOD SPEC: CPT | Performed by: FAMILY MEDICINE

## 2021-01-12 PROCEDURE — 3075F SYST BP GE 130 - 139MM HG: CPT | Performed by: FAMILY MEDICINE

## 2021-01-12 PROCEDURE — 99213 OFFICE O/P EST LOW 20 MIN: CPT | Performed by: FAMILY MEDICINE

## 2021-01-12 PROCEDURE — 3008F BODY MASS INDEX DOCD: CPT | Performed by: FAMILY MEDICINE

## 2021-01-12 PROCEDURE — 82962 GLUCOSE BLOOD TEST: CPT | Performed by: FAMILY MEDICINE

## 2021-01-12 RX ORDER — OXYBUTYNIN CHLORIDE 10 MG/1
10 TABLET, EXTENDED RELEASE ORAL DAILY
Qty: 30 TABLET | Refills: 1 | Status: ON HOLD | OUTPATIENT
Start: 2021-01-12 | End: 2021-06-19

## 2021-01-12 RX ORDER — INSULIN DEGLUDEC 200 U/ML
INJECTION, SOLUTION SUBCUTANEOUS
Qty: 6 PEN | Refills: 2 | Status: SHIPPED | OUTPATIENT
Start: 2021-01-12 | End: 2021-02-13

## 2021-01-12 NOTE — PROGRESS NOTES
1/12/2021  10:45 AM    Kobe Michaels is a 77year old female. Chief complaint(s): Patient presents with:  Diabetes: follow up  Urine incontinence  HPI:     Kobe Michaels primary complaint is regarding as above.      Patient Be Beauchamp JDTR screening 4/18/2014   • Meniscus tear 2012    Foreign body, meniscus tear   • Osteoporosis screening 11/29/2013    Dexa Scan   • Reflux     Takes OTC meds PRN \"Tums\"   • Type I (juvenile type) diabetes mellitus without mention of complication, not stated 01/09/2018      Fluzone Vaccine Medicare ()                          10/08/2013  11/10/2015  12/15/2015      Influenza             10/13/2009  10/08/2013      Influenza Vaccine, Preserv Free                          12/04/2007  10/13/2009 D3) Oral Cap Take 2 tab po Q day 180 capsule 3   • Meloxicam 15 MG Oral Tab Take 1 tablet (15 mg total) by mouth daily.  90 tablet 1   • metFORMIN HCl 850 MG Oral Tab TAKE ONE TABLET BY MOUTH  tablet 1   • Insulin Pen Needle (BD PEN NEEDLE ULTRAFINE) normal.   Skin: No rash noted.        LABORATORY RESULTS:   No results found for: Jitendra Memory   Results for orders placed or performed in visit on 01/12/21   GLUCOSE BLOOD TEST   Result Value Ref Range    GLUCOSE BLOO (120 mg total) by mouth daily. , Disp: 30 tablet, Rfl: 5  •  Metoprolol Tartrate 50 MG Oral Tab, Take 1 tablet (50 mg total) by mouth 2 (two) times daily. , Disp: 180 tablet, Rfl: 2  •  temazepam 30 MG Oral Cap, Take 1 capsule (30 mg total) by mouth nightly fasting glucose daily), return for training in administering insulin injections, adherence to an 1800 calorie ADA diet, a graduated exercise program, HgbA1C level checked quarterly, daily foot self-inspection, need for yearly flu shots, and avoid all sodas 1     Sig: Take 1 tablet (10 mg total) by mouth daily.        Imaging & Referrals:  None         Morales Orlando MD

## 2021-02-03 DIAGNOSIS — Z23 NEED FOR VACCINATION: ICD-10-CM

## 2021-02-13 ENCOUNTER — OFFICE VISIT (OUTPATIENT)
Dept: FAMILY MEDICINE CLINIC | Facility: CLINIC | Age: 76
End: 2021-02-13
Payer: COMMERCIAL

## 2021-02-13 VITALS
WEIGHT: 185.63 LBS | DIASTOLIC BLOOD PRESSURE: 79 MMHG | HEIGHT: 63 IN | HEART RATE: 82 BPM | BODY MASS INDEX: 32.89 KG/M2 | SYSTOLIC BLOOD PRESSURE: 126 MMHG

## 2021-02-13 DIAGNOSIS — E11.65 UNCONTROLLED TYPE 2 DIABETES MELLITUS WITH HYPERGLYCEMIA (HCC): Primary | ICD-10-CM

## 2021-02-13 LAB
GLUCOSE BLOOD: 239
TEST STRIP LOT #: NORMAL NUMERIC

## 2021-02-13 PROCEDURE — 3074F SYST BP LT 130 MM HG: CPT | Performed by: FAMILY MEDICINE

## 2021-02-13 PROCEDURE — 3078F DIAST BP <80 MM HG: CPT | Performed by: FAMILY MEDICINE

## 2021-02-13 PROCEDURE — 3008F BODY MASS INDEX DOCD: CPT | Performed by: FAMILY MEDICINE

## 2021-02-13 PROCEDURE — 99213 OFFICE O/P EST LOW 20 MIN: CPT | Performed by: FAMILY MEDICINE

## 2021-02-13 PROCEDURE — 36416 COLLJ CAPILLARY BLOOD SPEC: CPT | Performed by: FAMILY MEDICINE

## 2021-02-13 PROCEDURE — 82962 GLUCOSE BLOOD TEST: CPT | Performed by: FAMILY MEDICINE

## 2021-02-13 RX ORDER — INSULIN DEGLUDEC 200 U/ML
INJECTION, SOLUTION SUBCUTANEOUS
Qty: 6 PEN | Refills: 2 | Status: SHIPPED | OUTPATIENT
Start: 2021-02-13 | End: 2021-04-22

## 2021-02-13 RX ORDER — DILTIAZEM HYDROCHLORIDE 120 MG/1
120 TABLET, FILM COATED ORAL DAILY
Qty: 30 TABLET | Refills: 5 | Status: ON HOLD | OUTPATIENT
Start: 2021-02-13 | End: 2021-07-26

## 2021-02-13 NOTE — PROGRESS NOTES
2/13/2021  12:53 PM    Ernesto Soto is a 77year old female. Chief complaint(s): Patient presents with:  Diabetes: f/u unsure what insulin she is taking    HPI:     Ernesto Soto primary complaint is regarding Diabetes.      Patient Fabio Beasley mellitus without mention of complication, not stated as uncontrolled    • Unspecified essential hypertension    • Visual floaters 2009      Past Surgical History:   Procedure Laterality Date   • ARTHROSCOPY KNEE LEFT Left 10/7/2019    Performed by Chaka Mendieta, 12/04/2007  10/13/2009      Pneumococcal (Prevnar 13)                          08/03/2010  11/10/2015  12/15/2015      Pneumovax 23          08/03/2010      Medications (Active prior to today's visit):  Current Outpatient Medications   Medication Sig Dispe Units into the skin 3 (three) times daily before meals. 6 Device 3   • fluconazole (DIFLUCAN) 150 MG Oral Tab Take 1 tablet (150 mg total) by mouth daily.  3 tablet 0   • Insulin Lispro (HUMALOG KWIKPEN) 100 UNIT/ML Subcutaneous Solution Pen-injector Inject A&P  Poor complaint  LABORATORY & ORDERS: Blood test(s) ordered today ; Orders Placed This Encounter      POC Finger stick glucose [26053]    Additional orders include: take all medications as directed every day  MEDICATIONS:  •  dilTIAZem HCl 120 MG Oral 100 each, Rfl: 0  •  Lancets Does not apply Misc, Check blood sugar 2 times daily, Disp: 200 each, Rfl: 3  •  insulin aspart (NOVOLOG) 100 UNIT/ML Subcutaneous Solution, Inject 3 Units into the skin 3 (three) times daily before meals. , Disp: 6 Device, Rfl: to ADA and <6.5% according to AACE were discussed.          Orders This Visit:  Orders Placed This Encounter      POC Finger stick glucose [[id number]]      Meds This Visit:  Requested Prescriptions     Signed Prescriptions Disp Refills   • dilTIAZem HCl 120 MG

## 2021-02-18 ENCOUNTER — NURSE TRIAGE (OUTPATIENT)
Dept: FAMILY MEDICINE CLINIC | Facility: CLINIC | Age: 76
End: 2021-02-18

## 2021-02-18 RX ORDER — BLOOD-GLUCOSE METER
EACH MISCELLANEOUS
Qty: 1 KIT | Refills: 0 | Status: SHIPPED | OUTPATIENT
Start: 2021-02-18 | End: 2021-03-22

## 2021-02-18 RX ORDER — LANCETS
EACH MISCELLANEOUS
Qty: 300 EACH | Refills: 3 | Status: SHIPPED | OUTPATIENT
Start: 2021-02-18

## 2021-02-18 RX ORDER — BLOOD SUGAR DIAGNOSTIC
STRIP MISCELLANEOUS
Qty: 300 EACH | Refills: 3 | Status: SHIPPED | OUTPATIENT
Start: 2021-02-18 | End: 2021-08-02

## 2021-02-18 NOTE — TELEPHONE ENCOUNTER
Action Requested: Summary for Provider     []  Critical Lab, Recommendations Needed  [x] Need Additional Advice  []   FYI    []   Need Orders  [] Need Medications Sent to Pharmacy  []  Other     Language Betsy Coe #[id number]    SUMMARY: Patient reports Monday, she Information    Date Department Ordering/Authorizing   2/13/2021 Toby Martell MD             Reason for call: Dizziness   Onset: Data Unavailable                         Reason for Disposition  • Diabetes    Carmel

## 2021-02-19 NOTE — TELEPHONE ENCOUNTER
Call patient to continue present medication, to give it time for the medication to work, but needs to follow 1800 ADA diet. If She forgot  what this means then go back to see the dietitian. This is her medication regimen.     Current meds include : Not grisi

## 2021-02-19 NOTE — TELEPHONE ENCOUNTER
Dr. Maya Perez: please review patient glucose readings as indicated by Lourdes Haynes. Is there any medication dose adjustments needed? See message on how she is currently taking her medications. (does not match with her medication list)    Chart reviewed.    Will

## 2021-02-20 ENCOUNTER — HOSPITAL ENCOUNTER (EMERGENCY)
Facility: HOSPITAL | Age: 76
Discharge: HOME | End: 2021-02-20
Attending: EMERGENCY MEDICINE
Payer: MEDICARE

## 2021-02-20 VITALS
HEIGHT: 63 IN | HEART RATE: 105 BPM | DIASTOLIC BLOOD PRESSURE: 77 MMHG | OXYGEN SATURATION: 97 % | RESPIRATION RATE: 17 BRPM | WEIGHT: 180 LBS | TEMPERATURE: 97 F | BODY MASS INDEX: 31.89 KG/M2 | SYSTOLIC BLOOD PRESSURE: 143 MMHG

## 2021-02-20 DIAGNOSIS — N30.00 ACUTE CYSTITIS WITHOUT HEMATURIA: ICD-10-CM

## 2021-02-20 DIAGNOSIS — E16.2 HYPOGLYCEMIA: Primary | ICD-10-CM

## 2021-02-20 LAB
ANION GAP SERPL CALC-SCNC: 4 MMOL/L (ref 0–18)
BASOPHILS # BLD AUTO: 0.04 X10(3) UL (ref 0–0.2)
BASOPHILS NFR BLD AUTO: 0.4 %
BILIRUB UR QL: NEGATIVE
BUN BLD-MCNC: 17 MG/DL (ref 7–18)
BUN/CREAT SERPL: 25.8 (ref 10–20)
CALCIUM BLD-MCNC: 9.5 MG/DL (ref 8.5–10.1)
CHLORIDE SERPL-SCNC: 105 MMOL/L (ref 98–112)
CLARITY UR: CLEAR
CO2 SERPL-SCNC: 31 MMOL/L (ref 21–32)
COLOR UR: YELLOW
CREAT BLD-MCNC: 0.66 MG/DL
DEPRECATED RDW RBC AUTO: 45.3 FL (ref 35.1–46.3)
EOSINOPHIL # BLD AUTO: 0.08 X10(3) UL (ref 0–0.7)
EOSINOPHIL NFR BLD AUTO: 0.9 %
ERYTHROCYTE [DISTWIDTH] IN BLOOD BY AUTOMATED COUNT: 13.2 % (ref 11–15)
GLUCOSE BLD-MCNC: 105 MG/DL (ref 70–99)
GLUCOSE BLDC GLUCOMTR-MCNC: 109 MG/DL (ref 70–99)
GLUCOSE BLDC GLUCOMTR-MCNC: 114 MG/DL (ref 70–99)
GLUCOSE BLDC GLUCOMTR-MCNC: 158 MG/DL (ref 70–99)
GLUCOSE UR-MCNC: 50 MG/DL
HCT VFR BLD AUTO: 39.2 %
HGB BLD-MCNC: 12.7 G/DL
HGB UR QL STRIP.AUTO: NEGATIVE
HYALINE CASTS #/AREA URNS AUTO: 1 /LPF
IMM GRANULOCYTES # BLD AUTO: 0.02 X10(3) UL (ref 0–1)
IMM GRANULOCYTES NFR BLD: 0.2 %
KETONES UR-MCNC: NEGATIVE MG/DL
LYMPHOCYTES # BLD AUTO: 1.58 X10(3) UL (ref 1–4)
LYMPHOCYTES NFR BLD AUTO: 17.2 %
MCH RBC QN AUTO: 30.4 PG (ref 26–34)
MCHC RBC AUTO-ENTMCNC: 32.4 G/DL (ref 31–37)
MCV RBC AUTO: 93.8 FL
MONOCYTES # BLD AUTO: 0.62 X10(3) UL (ref 0.1–1)
MONOCYTES NFR BLD AUTO: 6.8 %
NEUTROPHILS # BLD AUTO: 6.84 X10 (3) UL (ref 1.5–7.7)
NEUTROPHILS # BLD AUTO: 6.84 X10(3) UL (ref 1.5–7.7)
NEUTROPHILS NFR BLD AUTO: 74.5 %
NITRITE UR QL STRIP.AUTO: NEGATIVE
OSMOLALITY SERPL CALC.SUM OF ELEC: 292 MOSM/KG (ref 275–295)
PH UR: 7 [PH] (ref 5–8)
PLATELET # BLD AUTO: 279 10(3)UL (ref 150–450)
POTASSIUM SERPL-SCNC: 3.4 MMOL/L (ref 3.5–5.1)
PROT UR-MCNC: NEGATIVE MG/DL
RBC # BLD AUTO: 4.18 X10(6)UL
RBC #/AREA URNS AUTO: 5 /HPF
SODIUM SERPL-SCNC: 140 MMOL/L (ref 136–145)
SP GR UR STRIP: 1.01 (ref 1–1.03)
UROBILINOGEN UR STRIP-ACNC: <2
WBC # BLD AUTO: 9.2 X10(3) UL (ref 4–11)
WBC #/AREA URNS AUTO: 15 /HPF

## 2021-02-20 PROCEDURE — 99284 EMERGENCY DEPT VISIT MOD MDM: CPT

## 2021-02-20 PROCEDURE — 85025 COMPLETE CBC W/AUTO DIFF WBC: CPT | Performed by: EMERGENCY MEDICINE

## 2021-02-20 PROCEDURE — 96365 THER/PROPH/DIAG IV INF INIT: CPT

## 2021-02-20 PROCEDURE — 87077 CULTURE AEROBIC IDENTIFY: CPT | Performed by: EMERGENCY MEDICINE

## 2021-02-20 PROCEDURE — 87086 URINE CULTURE/COLONY COUNT: CPT | Performed by: EMERGENCY MEDICINE

## 2021-02-20 PROCEDURE — 82962 GLUCOSE BLOOD TEST: CPT

## 2021-02-20 PROCEDURE — 87186 SC STD MICRODIL/AGAR DIL: CPT | Performed by: EMERGENCY MEDICINE

## 2021-02-20 PROCEDURE — 81001 URINALYSIS AUTO W/SCOPE: CPT | Performed by: EMERGENCY MEDICINE

## 2021-02-20 PROCEDURE — 80048 BASIC METABOLIC PNL TOTAL CA: CPT | Performed by: EMERGENCY MEDICINE

## 2021-02-20 RX ORDER — POTASSIUM CHLORIDE 20 MEQ/1
40 TABLET, EXTENDED RELEASE ORAL ONCE
Status: COMPLETED | OUTPATIENT
Start: 2021-02-20 | End: 2021-02-20

## 2021-02-20 RX ORDER — CEPHALEXIN 500 MG/1
500 CAPSULE ORAL 2 TIMES DAILY
Qty: 14 CAPSULE | Refills: 0 | Status: SHIPPED | OUTPATIENT
Start: 2021-02-20 | End: 2021-02-27

## 2021-02-20 NOTE — ED PROVIDER NOTES
Patient Seen in: [facility] [place] AND [facility] Emergency Department    History   Patient presents with:  Hypoglycemia    Stated Complaint: BS 64     HPI    67-year-old female with past medical history of hypertension, dyslipidemia, diabetes (on [place] in addition to • COLONOSCOPY N/A 1/12/2017    Performed by Brittany Palma MD at [address] [place] [address] ENDOSCOPY   • ELECTROCARDIOGRAM, COMPLETE  4/21/2014    Scanned to media tab   • HYSTERECTOMY  2014    TLH/BSO/ A&P repair/ uteroscral spine fixation   • OTHER SURGICAL HISTORY Right Tab,  Take 1 tablet (15 mg total) by mouth daily.    metFORMIN HCl 850 MG Oral Tab,  TAKE ONE TABLET BY MOUTH BID   Insulin Lispro (HUMALOG KWIKPEN) 100 UNIT/ML Subcutaneous Solution Pen-injector,  Inject 3 Units into the skin 3 (three) times daily with carla Constitutional: No distress. Nontoxic, well-appearing. HEENT: MMM. Head: Normocephalic. Eyes: No injection. Cardiovascular: RRR. Pulmonary/Chest: Effort normal. CTAB. Abdominal: Soft. No CVA/flank tenderness.   Musculoskeletal: No gross deformity physical exam differential diagnosis includes but is not limited to UTI, hypoglycemia, ARIELLE, electrolyte derangement.     Pulse ox: 99%:Normal on RA, as interpreted by myself    Evaluation for hypoglycemia preceded by urinary frequency/hesitancy with glycemi R-0

## 2021-02-20 NOTE — TELEPHONE ENCOUNTER
Advised patient's daughter (Brendon Rodriguez) of Dr. Don Ralph note. Patient's daughter verbalized understanding  Per daughter, patient was in the emergency room yesterday for a blood glucose reading of 56 mg/dl.    Per daughter, patient was very fatigued and co

## 2021-02-20 NOTE — TELEPHONE ENCOUNTER
Advised patient's daughter (Mirta Bowman) of Dr. Wynne Apo note. Patient's daughter verbalized understanding  Patient scheduled at 11 am on 2/22/21 for ER follow-up.

## 2021-02-20 NOTE — ED NOTES
Patient states prior to going to sleep she felt dizzy and felt unsteady. Denies falls. Spouse told ems, increased urination, urgency and frequency.   used during triage [id number]

## 2021-02-20 NOTE — ED INITIAL ASSESSMENT (HPI)
Patient arrived via ems, at home, family stated her breathing sounded funny and checked her sugar was 56. Triage . EMS started D10 250 ml bag.

## 2021-02-20 NOTE — TELEPHONE ENCOUNTER
Ok stop the amaryl for now until able to see Dr. Rasmussen Murders.   Should make appt for Monday and bring in readings

## 2021-02-22 ENCOUNTER — OFFICE VISIT (OUTPATIENT)
Dept: FAMILY MEDICINE CLINIC | Facility: CLINIC | Age: 76
End: 2021-02-22
Payer: MEDICARE

## 2021-02-22 VITALS
HEART RATE: 88 BPM | WEIGHT: 185 LBS | SYSTOLIC BLOOD PRESSURE: 138 MMHG | HEIGHT: 63 IN | BODY MASS INDEX: 32.78 KG/M2 | DIASTOLIC BLOOD PRESSURE: 78 MMHG

## 2021-02-22 DIAGNOSIS — E16.2 HYPOGLYCEMIA: ICD-10-CM

## 2021-02-22 DIAGNOSIS — E11.65 UNCONTROLLED TYPE 2 DIABETES MELLITUS WITH HYPERGLYCEMIA (HCC): Primary | ICD-10-CM

## 2021-02-22 PROCEDURE — 99213 OFFICE O/P EST LOW 20 MIN: CPT | Performed by: FAMILY MEDICINE

## 2021-02-22 NOTE — PROGRESS NOTES
2/22/2021  11:13 AM    Aimee Marquez is a 77year old female. Chief complaint(s): Patient presents with:  ER F/U: Hypoglycemia FBS this am was 32    HPI:     Aimee Marquez primary complaint is regarding Diabetes.      Patient Emi lobato 04 [facility] mention of complication, not stated as uncontrolled    • Unspecified essential hypertension    • Visual floaters 2009      Past Surgical History:   Procedure Laterality Date   • ARTHROSCOPY KNEE LEFT Left 10/7/2019    Performed by Adriana Ledezma MD a 10/13/2009      Pneumococcal (Prevnar 13)                          08/03/2010  11/10/2015  12/15/2015      Pneumovax 23          08/03/2010      Medications (Active prior to today's visit):  Current Outpatient Medications   Medication Sig Dispense Refill Vitro Strip USE TO TEST TWICE DAILY 100 each 11   • Lancets Does not apply Misc Test blood sugar 2 times daily. Lancets compatible with contour next.  100 each 11   • insulin aspart (NOVOLOG) 100 UNIT/ML Subcutaneous Solution Inject 3 Units into the skin 3 LABORATORY RESULTS:   No results found for: Leopold Hanson   Results for orders placed or performed during the hospital encounter of 22/24/38   BASIC METABOLIC PANEL (8)   Result Value Ref Range    Glucose 105 (H) Range    Urine Culture >100,000 CFU/ML Escherichia coli (A)        Susceptibility    Escherichia coli -  (no method available)     Ampicillin >=32 Resistant      Ampicillin + Sulbactam >=32 Resistant      Cefazolin 16 Sensitive      Ciprofloxacin 0.5 Sensi Degludec (TRESIBA FLEXTOUCH) 200 UNIT/ML Subcutaneous Solution Pen-injector, Use 35 units Q hs and 25 units Q am, Disp: 6 pen, Rfl: 2  •  Oxybutynin Chloride ER 10 MG Oral Tablet 24 Hr, Take 1 tablet (10 mg total) by mouth daily. , Disp: 30 tablet, Rfl: 1 skin 3 (three) times daily before meals. (Patient not taking: Reported on 2/22/2021 ), Disp: 6 Device, Rfl: 3  •  fluconazole (DIFLUCAN) 150 MG Oral Tab, Take 1 tablet (150 mg total) by mouth daily.  (Patient not taking: Reported on 2/22/2021 ), Disp: 3 tab glucoses ( mg/dl) and post meal glucoses (<140-160 mg/dl) Home glucose testing discussed. The A1c target of <7% according to ADA and <6.5% according to AACE were discussed.     Patient to call the office if her sugars goes less than 80 or more than 17

## 2021-02-22 NOTE — TELEPHONE ENCOUNTER
EBONIE Bhatti pt will see you today at 11am    Future Appointments   Date Time Provider Radha Greene   2/22/2021 11:00 AM Rodriguez Vazquez MD [facility] ADO

## 2021-02-23 ENCOUNTER — TELEPHONE (OUTPATIENT)
Dept: FAMILY MEDICINE CLINIC | Facility: CLINIC | Age: 76
End: 2021-02-23

## 2021-02-23 ENCOUNTER — HOSPITAL ENCOUNTER (EMERGENCY)
Facility: HOSPITAL | Age: 76
Discharge: HOME | End: 2021-02-23
Attending: EMERGENCY MEDICINE
Payer: MEDICARE

## 2021-02-23 VITALS
DIASTOLIC BLOOD PRESSURE: 70 MMHG | SYSTOLIC BLOOD PRESSURE: 132 MMHG | WEIGHT: 193 LBS | TEMPERATURE: 97 F | HEART RATE: 82 BPM | RESPIRATION RATE: 20 BRPM | HEIGHT: 63 IN | BODY MASS INDEX: 34.2 KG/M2 | OXYGEN SATURATION: 95 %

## 2021-02-23 DIAGNOSIS — Z79.4 TYPE 2 DIABETES MELLITUS WITH HYPOGLYCEMIA WITHOUT COMA, WITH LONG-TERM CURRENT USE OF INSULIN (HCC): Primary | ICD-10-CM

## 2021-02-23 DIAGNOSIS — E11.649 TYPE 2 DIABETES MELLITUS WITH HYPOGLYCEMIA WITHOUT COMA, WITH LONG-TERM CURRENT USE OF INSULIN (HCC): Primary | ICD-10-CM

## 2021-02-23 LAB
ANION GAP SERPL CALC-SCNC: 5 MMOL/L (ref 0–18)
BACTERIA UR QL AUTO: NEGATIVE /HPF
BASOPHILS # BLD AUTO: 0.04 X10(3) UL (ref 0–0.2)
BASOPHILS NFR BLD AUTO: 0.5 %
BILIRUB UR QL: NEGATIVE
BUN BLD-MCNC: 21 MG/DL (ref 7–18)
BUN/CREAT SERPL: 30 (ref 10–20)
CALCIUM BLD-MCNC: 9.7 MG/DL (ref 8.5–10.1)
CHLORIDE SERPL-SCNC: 105 MMOL/L (ref 98–112)
CLARITY UR: CLEAR
CO2 SERPL-SCNC: 31 MMOL/L (ref 21–32)
COLOR UR: YELLOW
CREAT BLD-MCNC: 0.7 MG/DL
DEPRECATED RDW RBC AUTO: 45.5 FL (ref 35.1–46.3)
EOSINOPHIL # BLD AUTO: 0.07 X10(3) UL (ref 0–0.7)
EOSINOPHIL NFR BLD AUTO: 0.9 %
ERYTHROCYTE [DISTWIDTH] IN BLOOD BY AUTOMATED COUNT: 13.1 % (ref 11–15)
GLUCOSE BLD-MCNC: 100 MG/DL (ref 70–99)
GLUCOSE BLDC GLUCOMTR-MCNC: 107 MG/DL (ref 70–99)
GLUCOSE UR-MCNC: NEGATIVE MG/DL
HCT VFR BLD AUTO: 40.8 %
HGB BLD-MCNC: 13.2 G/DL
HGB UR QL STRIP.AUTO: NEGATIVE
IMM GRANULOCYTES # BLD AUTO: 0.02 X10(3) UL (ref 0–1)
IMM GRANULOCYTES NFR BLD: 0.3 %
KETONES UR-MCNC: NEGATIVE MG/DL
LEUKOCYTE ESTERASE UR QL STRIP.AUTO: NEGATIVE
LYMPHOCYTES # BLD AUTO: 1.68 X10(3) UL (ref 1–4)
LYMPHOCYTES NFR BLD AUTO: 22 %
MCH RBC QN AUTO: 30.6 PG (ref 26–34)
MCHC RBC AUTO-ENTMCNC: 32.4 G/DL (ref 31–37)
MCV RBC AUTO: 94.4 FL
MONOCYTES # BLD AUTO: 0.45 X10(3) UL (ref 0.1–1)
MONOCYTES NFR BLD AUTO: 5.9 %
NEUTROPHILS # BLD AUTO: 5.37 X10 (3) UL (ref 1.5–7.7)
NEUTROPHILS # BLD AUTO: 5.37 X10(3) UL (ref 1.5–7.7)
NEUTROPHILS NFR BLD AUTO: 70.4 %
NITRITE UR QL STRIP.AUTO: NEGATIVE
OSMOLALITY SERPL CALC.SUM OF ELEC: 295 MOSM/KG (ref 275–295)
PH UR: 8 [PH] (ref 5–8)
PLATELET # BLD AUTO: 276 10(3)UL (ref 150–450)
POTASSIUM SERPL-SCNC: 3.8 MMOL/L (ref 3.5–5.1)
PROT UR-MCNC: 30 MG/DL
RBC # BLD AUTO: 4.32 X10(6)UL
RBC #/AREA URNS AUTO: 2 /HPF
SODIUM SERPL-SCNC: 141 MMOL/L (ref 136–145)
SP GR UR STRIP: 1.01 (ref 1–1.03)
UROBILINOGEN UR STRIP-ACNC: <2
WBC # BLD AUTO: 7.6 X10(3) UL (ref 4–11)
WBC #/AREA URNS AUTO: 1 /HPF

## 2021-02-23 PROCEDURE — 99285 EMERGENCY DEPT VISIT HI MDM: CPT

## 2021-02-23 PROCEDURE — 80048 BASIC METABOLIC PNL TOTAL CA: CPT | Performed by: EMERGENCY MEDICINE

## 2021-02-23 PROCEDURE — 82962 GLUCOSE BLOOD TEST: CPT

## 2021-02-23 PROCEDURE — 96374 THER/PROPH/DIAG INJ IV PUSH: CPT

## 2021-02-23 PROCEDURE — 93005 ELECTROCARDIOGRAM TRACING: CPT

## 2021-02-23 PROCEDURE — 93010 ELECTROCARDIOGRAM REPORT: CPT | Performed by: EMERGENCY MEDICINE

## 2021-02-23 PROCEDURE — 96361 HYDRATE IV INFUSION ADD-ON: CPT

## 2021-02-23 PROCEDURE — 85025 COMPLETE CBC W/AUTO DIFF WBC: CPT | Performed by: EMERGENCY MEDICINE

## 2021-02-23 PROCEDURE — 81001 URINALYSIS AUTO W/SCOPE: CPT | Performed by: EMERGENCY MEDICINE

## 2021-02-23 RX ORDER — DILTIAZEM HYDROCHLORIDE 5 MG/ML
10 INJECTION INTRAVENOUS ONCE
Status: COMPLETED | OUTPATIENT
Start: 2021-02-23 | End: 2021-02-23

## 2021-02-23 NOTE — ED NOTES
Patient received discharge instructions with follow-up instructionsand verbalized understanding. Patient ambulated out of ER in stable condition in no apparent distress.

## 2021-02-23 NOTE — TELEPHONE ENCOUNTER
Phone call made spoke with her daughter. Patient is in the ER due to hypoglycemia.    After long discussion about poor diet complaince and possibly not taking the original insulin dosage correctly and resulting in an  increased of insulin higher dosage than

## 2021-02-23 NOTE — TELEPHONE ENCOUNTER
Daughter, Sabrina Gonzalez contacted office. States her mom is in the ER. Daughter is upset with dosage change in Insulin. Sabrina Gonzalez asking to speak with pcp to help her understand why this was done.   States her father was with patient at the office visit yest

## 2021-02-23 NOTE — ED INITIAL ASSESSMENT (HPI)
Aishwarya Dejesus arrives via [facility] for hypoglycemia. Apparently she had low blood sugar at 3 am, took a glucose tab.  Family then checked on her around 6 and she was unresponsive, blood sugar 40.    125 ML d10 given , patient awake upon arrival    Recent

## 2021-02-23 NOTE — ED PROVIDER NOTES
Patient Seen in: [facility] AND [facility] Emergency Department      History   Patient presents with:  Hypoglycemia    Stated Complaint: hypogelycmia    HPI/Subjective:   HPI    71-year-old female with past medical history significant for diabetes on insulin, hi Dr. Madhavi Lester @ [address] [facility] 12/19/2019    Dr. Madhavi Lester @ [address] [place] [address]     • COLONOSCOPY N/A 1/12/2017    Performed by Guillermo Stallworth MD at [facility] ENDOSCOPY   • ELECTROCARDIOGRAM, COMPLETE  4/21/2014 sig cervical LAD   Neurological: Awake, alert. Normal reflexes. No cranial nerve deficit. Skin: Skin is warm and dry. No rash noted. No erythema.    Psychiatric:    ED Course     Labs Reviewed   BASIC METABOLIC PANEL (8) - Abnormal; Notable for the follo Levemir and 35 units in the evening. Patient was only taking 35 units at night and was not taking any morning dose of long-acting insulin.   After the prescription for Tresiba, patient started taking 25 units in the morning and since then has been having h

## 2021-02-24 NOTE — TELEPHONE ENCOUNTER
Pt's daughter-Mariama calling and states blood sugar was 38 at 7am this am. She had one glucose tablet, fruit and breakfast and took 5 Units of Novolog (order 3 Units TID).  She never re-checked the blood sugar, asked to check it while on the phone and sh

## 2021-03-01 ENCOUNTER — TELEPHONE (OUTPATIENT)
Dept: FAMILY MEDICINE CLINIC | Facility: CLINIC | Age: 76
End: 2021-03-01

## 2021-03-01 NOTE — TELEPHONE ENCOUNTER
Clinical staff, please follow up on this form and have Dr. Khushi Herbert sign the fax to Medicare. Thank you.

## 2021-03-01 NOTE — TELEPHONE ENCOUNTER
Pharmacy states CMN form needs to be filled so diabetic testing supplies can be covered by medicare.  Can call [phone] to complete

## 2021-03-01 NOTE — TELEPHONE ENCOUNTER
Patient's daughter, Karlos Peter, is calling to follow up on status of patient's CMN form. Daughter states pharmacy has still not received completed form. Please advise.

## 2021-03-05 NOTE — TELEPHONE ENCOUNTER
Patients daughter is calling to ask to refax this to the pharmacy. They never received the form.  Fax #[phone]

## 2021-03-06 NOTE — TELEPHONE ENCOUNTER
Rosanne from [facility] requesting to refax forms. pharmacy # [phone]  Thank you.

## 2021-03-11 NOTE — TELEPHONE ENCOUNTER
Phone call made to pharmacy to clarify. Per pharmacist patient was notified last night that form was completed however product was not available and was ordered. Patient will be notified once its ready for p/u.  Per pharmacist they should have glucometer ready by tomorrow after 3 pm. Per pharmacist patient was ok will notify daughter

## 2021-03-11 NOTE — TELEPHONE ENCOUNTER
Phone call made to daughter Jonelle Wright. Advised her that I spoke to pharmacist this morning and was told that form was completed already nothing else needed. However per pharmacist machine is not available at pharmacy but was ordered for pt and should be ready tomorrow. Daughter verbalized understanding and will contact pharmacy as well to verify.

## 2021-03-12 NOTE — TELEPHONE ENCOUNTER
Patient's daughter, Maya Dobbins, is calling to follow up and states she was advised by pharmacy that CMN was sent incorrectly. Please advise.

## 2021-03-13 NOTE — TELEPHONE ENCOUNTER
6th time this form has been faxed to [facility] and Medicare. Called pharmacy and now they state that form is still incomplete when on Thursday pharmacists said otherwise. Forms re-faxed again to Medicare and [facility] completed with all questions answered. Since its Saturday unable to verify with Medicare.

## 2021-03-15 NOTE — TELEPHONE ENCOUNTER
Patient's daughter stated section 9 on the CMN form needs to be filled out. She is requesting to  the form tomorrow.

## 2021-03-15 NOTE — TELEPHONE ENCOUNTER
Phone call made to Medicare, s/t John per john this is a refill to soon, in there system its showing that pt obtained test strips 3 days ago. Does not know from what pharmacy. Unable to relate that information. Spoke to pharmacist Parul Baker who stated the same thing that its populating as a refill to soon. Parul Baker will call medicare to see why its reflecting as a refill to soon. Spoke to daughter Cristofer Heart who keeps insisting that CMN form is not complete and that this should not be a refill too soon because pt was suppose to be getting a whole new glucometer. Advised daughter that I was just relating to her what medicare and pharmacy were telling me. Daughter stated she would call pharmacy and Medicare as well. Around 3:48 received call from Parul Baker stating that Medicare was telling him that CMN form was incomplete. Per Parul Baker requesting that I fax completed form to him. Form was faxed to pharmacy advised Parul Baker that this has been an on going issue since 2/22, per Parul Baker he will take care of it once he receives form.

## 2021-03-22 ENCOUNTER — OFFICE VISIT (OUTPATIENT)
Dept: FAMILY MEDICINE CLINIC | Facility: CLINIC | Age: 76
End: 2021-03-22
Payer: MEDICARE

## 2021-03-22 VITALS
HEART RATE: 85 BPM | HEIGHT: 63 IN | BODY MASS INDEX: 33.31 KG/M2 | SYSTOLIC BLOOD PRESSURE: 138 MMHG | DIASTOLIC BLOOD PRESSURE: 78 MMHG | WEIGHT: 188 LBS

## 2021-03-22 DIAGNOSIS — I10 ESSENTIAL HYPERTENSION: ICD-10-CM

## 2021-03-22 DIAGNOSIS — E11.65 UNCONTROLLED TYPE 2 DIABETES MELLITUS WITH HYPERGLYCEMIA (HCC): Primary | ICD-10-CM

## 2021-03-22 DIAGNOSIS — I48.21 PERMANENT ATRIAL FIBRILLATION (HCC): ICD-10-CM

## 2021-03-22 LAB
GLUCOSE BLOOD: 97
TEST STRIP LOT #: NORMAL NUMERIC

## 2021-03-22 PROCEDURE — 82962 GLUCOSE BLOOD TEST: CPT | Performed by: FAMILY MEDICINE

## 2021-03-22 PROCEDURE — 36416 COLLJ CAPILLARY BLOOD SPEC: CPT | Performed by: FAMILY MEDICINE

## 2021-03-22 PROCEDURE — 99214 OFFICE O/P EST MOD 30 MIN: CPT | Performed by: FAMILY MEDICINE

## 2021-03-22 NOTE — PROGRESS NOTES
3/22/2021  10:33 AM    Yanira Mclain is a 77year old female. Chief complaint(s): Patient presents with:  Diabetes  HTN  HPI:     Yanira Slot primary complaint is regarding as above.       Patient Trae Mclean a 77year old female is here to be Cold with flu    • Cup to disc asymmetry    • Diabetes mellitus ([facility] [address] 2011    Diabetes no retinopathy   • High blood pressure    • History of pregnancy      x3 (max 10 lbs), SAB x1   • Hypercholesterolemia    • Lipid screening 2014   • M No       Immunizations:     Immunization History  Administered            Date(s) Administered    FLU VAC High Dose 65 YRS & Older PRSV Free (06801)                          12/15/2015      FLUAD High Dose 72 yr and older (78815)                          0 capsule 0   • Diclofenac Sodium 1 % Transdermal Gel Apply 2 g topically 4 (four) times daily.  100 g 3   • Misc Natural Products (GLUCOSAMINE CHONDROITIN VIT D3) Oral Cap Take 2 tab po Q day 180 capsule 3   • Meloxicam 15 MG Oral Tab Take 1 tablet (15 mg to Conjunctiva/sclera: Conjunctivae normal.   Cardiovascular:      Rate and Rhythm: Normal rate. Rhythm irregular. Pulmonary:      Effort: Pulmonary effort is normal.      Breath sounds: Normal breath sounds.    Musculoskeletal:      Cervical back: Neck supp not apply Misc, Test blood sugar 2 times daily. Lancets compatible with contour next., Disp: 100 each, Rfl: 11  •  insulin aspart (NOVOLOG) 100 UNIT/ML Subcutaneous Solution, Inject 3 Units into the skin 3 (three) times daily before meals. , Disp: 6 Device, total) by mouth daily with food.       RECOMMENDATIONS: instructed in use of glucometer ( check fasting glucose multiple times a day), return for training in administering insulin injections, adherence to an 1800 calorie ADA diet,  5 pound weight loss, a gr Disp Refills   • Rivaroxaban (XARELTO) 20 MG Oral Tab 90 tablet 1     Sig: Take 1 tablet (20 mg total) by mouth daily with food.        Imaging & Referrals:  None         Morales Orlando MD

## 2021-04-22 ENCOUNTER — OFFICE VISIT (OUTPATIENT)
Dept: FAMILY MEDICINE CLINIC | Facility: CLINIC | Age: 76
End: 2021-04-22
Payer: MEDICARE

## 2021-04-22 VITALS
DIASTOLIC BLOOD PRESSURE: 76 MMHG | WEIGHT: 190 LBS | HEIGHT: 63 IN | SYSTOLIC BLOOD PRESSURE: 138 MMHG | HEART RATE: 118 BPM | BODY MASS INDEX: 33.66 KG/M2

## 2021-04-22 DIAGNOSIS — Z79.4 TYPE 2 DIABETES MELLITUS WITH DIABETIC POLYNEUROPATHY, WITH LONG-TERM CURRENT USE OF INSULIN (HCC): Primary | ICD-10-CM

## 2021-04-22 DIAGNOSIS — E11.42 TYPE 2 DIABETES MELLITUS WITH DIABETIC POLYNEUROPATHY, WITH LONG-TERM CURRENT USE OF INSULIN (HCC): Primary | ICD-10-CM

## 2021-04-22 DIAGNOSIS — M15.9 PRIMARY OSTEOARTHRITIS INVOLVING MULTIPLE JOINTS: ICD-10-CM

## 2021-04-22 PROCEDURE — 99213 OFFICE O/P EST LOW 20 MIN: CPT | Performed by: FAMILY MEDICINE

## 2021-04-22 RX ORDER — ACETAMINOPHEN 500 MG
500 TABLET ORAL EVERY 6 HOURS PRN
Qty: 60 TABLET | Refills: 3 | Status: SHIPPED | OUTPATIENT
Start: 2021-04-22

## 2021-04-22 RX ORDER — GABAPENTIN 100 MG/1
300 CAPSULE ORAL 3 TIMES DAILY
Qty: 270 CAPSULE | Refills: 1 | Status: ON HOLD | OUTPATIENT
Start: 2021-04-22 | End: 2021-06-19

## 2021-04-22 RX ORDER — INSULIN DEGLUDEC 200 U/ML
INJECTION, SOLUTION SUBCUTANEOUS
Qty: 6 PEN | Refills: 2 | Status: ON HOLD | OUTPATIENT
Start: 2021-04-22 | End: 2021-07-26

## 2021-04-22 NOTE — PROGRESS NOTES
4/22/2021  11:11 AM    Ernesto Soto is a 77year old female. Chief complaint(s): Patient presents with:  Diabetes: f/u  Leg Pain: c/o bilateral leg pain     HPI:     Ernesto Soto primary complaint is regarding as above.        Patient Ernesto Soto Osteoporosis screening 11/29/2013    Dexa Scan   • Reflux     Takes OTC meds PRN \"Tums\"   • Type I (juvenile type) diabetes mellitus without mention of complication, not stated as uncontrolled    • Unspecified essential hypertension    • Visual floaters Free (13842)                          12/15/2015      FLUAD High Dose 72 yr and older (09674)                          01/09/2018      Fluzone Vaccine Medicare ()                          10/08/2013  11/10/2015  12/15/2015      Influenza             1 glucose 3 times daily 300 each 3   • Glucose Blood (CONTOUR NEXT TEST) In Vitro Strip USE TO TEST TWICE DAILY 100 each 11   • Lancets Does not apply Misc Test blood sugar 2 times daily. Lancets compatible with contour next.  100 each 11   • Diclofenac Sodiu visit on 03/22/21   GLUCOSE BLOOD TEST   Result Value Ref Range    GLUCOSE BLOOD 97     Test Strip Lot # [id number] Numeric    Test Strip Expiration Date 08/16/2021 Date   A1c 7.2    EKG / Spirometry : -     Radiology: No results found.      ASSESSMENT/PLAN: each, Rfl: 0  •  OneTouch UltraSoft Lancets Does not apply Misc, Test glucose 3 times daily, Disp: 300 each, Rfl: 3  •  Glucose Blood (ONETOUCH ULTRA) In Vitro Strip, Test glucose 3 times daily, Disp: 300 each, Rfl: 3  •  Glucose Blood (CONTOUR NEXT TEST) cerebrovascular and peripheral vascular disease. The patient was counseled concerning the relationship between diabetes control and retinopathy, nephropathy, and neuropathy.  Advised as to the targets of pre-meal glucoses ( mg/dl) and post meal glucos

## 2021-05-06 ENCOUNTER — TELEPHONE (OUTPATIENT)
Dept: FAMILY MEDICINE CLINIC | Facility: CLINIC | Age: 76
End: 2021-05-06

## 2021-05-06 NOTE — TELEPHONE ENCOUNTER
Patient is requesting a call to discuss instructions for insulin medication. Please advise if patient needs to continue testing blood sugar 3 times a day.

## 2021-05-06 NOTE — TELEPHONE ENCOUNTER
Per [employer] #[id number], patient wanting to clarify Tresiba medication. Informed of order below.       Insulin Degludec (TRESIBA FLEXTOUCH) 200 UNIT/ML Subcutaneous Solution Pen-injector 6 pen 2 4/22/2021     Sig: Use 20 units Q hs and 5 units Q am

## 2021-06-15 ENCOUNTER — TELEPHONE (OUTPATIENT)
Dept: FAMILY MEDICINE CLINIC | Facility: CLINIC | Age: 76
End: 2021-06-15

## 2021-06-15 NOTE — TELEPHONE ENCOUNTER
Pts daughter Erleen Kussmaul who is on hippa would like to know the # for glass that are over the counter. Dr gave the number to patient so they can go to pharmacy an buy them but she lost the number for the prescription.  Please advise

## 2021-06-16 NOTE — TELEPHONE ENCOUNTER
Spoke to daughter Karla Mock whom stated that the patient saw Dr Jordan Pressley and he told her to buy OTC glasses not Dr Emma Pike. However pt cannot remember the number to the glasses he recommended. Message will be fwd to Dr Jordan Pressley to advise.

## 2021-06-19 ENCOUNTER — APPOINTMENT (OUTPATIENT)
Dept: CT IMAGING | Facility: HOSPITAL | Age: 76
DRG: 065 | End: 2021-06-19
Attending: EMERGENCY MEDICINE
Payer: MEDICARE

## 2021-06-19 ENCOUNTER — HOSPITAL ENCOUNTER (INPATIENT)
Facility: HOSPITAL | Age: 76
LOS: 3 days | Discharge: NURSING, REHAB OR LONG-TERM CARE | DRG: 065 | End: 2021-06-23
Attending: EMERGENCY MEDICINE | Admitting: HOSPITALIST
Payer: MEDICARE

## 2021-06-19 DIAGNOSIS — R11.2 NAUSEA AND VOMITING, INTRACTABILITY OF VOMITING NOT SPECIFIED, UNSPECIFIED VOMITING TYPE: ICD-10-CM

## 2021-06-19 DIAGNOSIS — I10 HYPERTENSION, UNSPECIFIED TYPE: Primary | ICD-10-CM

## 2021-06-19 DIAGNOSIS — R42 VERTIGO: ICD-10-CM

## 2021-06-19 PROCEDURE — 99223 1ST HOSP IP/OBS HIGH 75: CPT | Performed by: HOSPITALIST

## 2021-06-19 PROCEDURE — 70450 CT HEAD/BRAIN W/O DYE: CPT | Performed by: EMERGENCY MEDICINE

## 2021-06-19 RX ORDER — MECLIZINE HYDROCHLORIDE 25 MG/1
25 TABLET ORAL ONCE
Status: COMPLETED | OUTPATIENT
Start: 2021-06-19 | End: 2021-06-19

## 2021-06-19 RX ORDER — METOPROLOL TARTRATE 50 MG/1
50 TABLET, FILM COATED ORAL 2 TIMES DAILY
Status: DISCONTINUED | OUTPATIENT
Start: 2021-06-19 | End: 2021-06-20

## 2021-06-19 RX ORDER — ONDANSETRON 4 MG/1
4 TABLET, ORALLY DISINTEGRATING ORAL EVERY 4 HOURS PRN
Qty: 12 TABLET | Refills: 0 | Status: SHIPPED | OUTPATIENT
Start: 2021-06-19 | End: 2021-06-26

## 2021-06-19 RX ORDER — ONDANSETRON 2 MG/ML
4 INJECTION INTRAMUSCULAR; INTRAVENOUS EVERY 6 HOURS PRN
Status: DISCONTINUED | OUTPATIENT
Start: 2021-06-19 | End: 2021-06-23

## 2021-06-19 RX ORDER — SODIUM CHLORIDE 9 MG/ML
INJECTION, SOLUTION INTRAVENOUS CONTINUOUS
Status: ACTIVE | OUTPATIENT
Start: 2021-06-19 | End: 2021-06-21

## 2021-06-19 RX ORDER — ACETAMINOPHEN 500 MG
1000 TABLET ORAL EVERY 6 HOURS PRN
Status: DISCONTINUED | OUTPATIENT
Start: 2021-06-19 | End: 2021-06-23

## 2021-06-19 RX ORDER — DILTIAZEM HYDROCHLORIDE 120 MG/1
120 CAPSULE, EXTENDED RELEASE ORAL DAILY
Status: DISCONTINUED | OUTPATIENT
Start: 2021-06-20 | End: 2021-06-20

## 2021-06-19 RX ORDER — BUTALBITAL, ACETAMINOPHEN AND CAFFEINE 50; 325; 40 MG/1; MG/1; MG/1
1 TABLET ORAL EVERY 4 HOURS PRN
Status: DISCONTINUED | OUTPATIENT
Start: 2021-06-19 | End: 2021-06-23

## 2021-06-19 RX ORDER — DEXTROSE MONOHYDRATE 25 G/50ML
50 INJECTION, SOLUTION INTRAVENOUS
Status: DISCONTINUED | OUTPATIENT
Start: 2021-06-19 | End: 2021-06-23

## 2021-06-19 RX ORDER — MORPHINE SULFATE 4 MG/ML
2 INJECTION, SOLUTION INTRAMUSCULAR; INTRAVENOUS ONCE
Status: COMPLETED | OUTPATIENT
Start: 2021-06-19 | End: 2021-06-19

## 2021-06-19 RX ORDER — ONDANSETRON 2 MG/ML
4 INJECTION INTRAMUSCULAR; INTRAVENOUS ONCE
Status: COMPLETED | OUTPATIENT
Start: 2021-06-19 | End: 2021-06-19

## 2021-06-19 RX ORDER — DOCUSATE SODIUM 100 MG/1
100 CAPSULE, LIQUID FILLED ORAL 2 TIMES DAILY PRN
Status: DISCONTINUED | OUTPATIENT
Start: 2021-06-19 | End: 2021-06-23

## 2021-06-19 RX ORDER — POTASSIUM CHLORIDE 20 MEQ/1
40 TABLET, EXTENDED RELEASE ORAL ONCE
Status: COMPLETED | OUTPATIENT
Start: 2021-06-19 | End: 2021-06-19

## 2021-06-19 RX ORDER — NAPROXEN 250 MG/1
250 TABLET ORAL 2 TIMES DAILY PRN
Status: ON HOLD | COMMUNITY
End: 2021-06-23

## 2021-06-19 RX ORDER — ONDANSETRON 2 MG/ML
INJECTION INTRAMUSCULAR; INTRAVENOUS
Status: COMPLETED
Start: 2021-06-19 | End: 2021-06-19

## 2021-06-19 RX ORDER — METOCLOPRAMIDE HYDROCHLORIDE 5 MG/ML
10 INJECTION INTRAMUSCULAR; INTRAVENOUS EVERY 8 HOURS PRN
Status: DISCONTINUED | OUTPATIENT
Start: 2021-06-19 | End: 2021-06-22

## 2021-06-19 RX ORDER — MECLIZINE HYDROCHLORIDE 25 MG/1
25 TABLET ORAL 3 TIMES DAILY PRN
Qty: 20 TABLET | Refills: 0 | Status: SHIPPED | OUTPATIENT
Start: 2021-06-19

## 2021-06-19 RX ORDER — POLYETHYLENE GLYCOL 3350 17 G/17G
17 POWDER, FOR SOLUTION ORAL DAILY PRN
Status: DISCONTINUED | OUTPATIENT
Start: 2021-06-19 | End: 2021-06-23

## 2021-06-19 NOTE — ED INITIAL ASSESSMENT (HPI)
Pt brought in by [facility]. Per EMS, pt's family concerned about pt's blood sugar and blood pressure. Pt states that she has not been feeling well since yesterday. Pt is currently awake and alert, answering questions appropriately.

## 2021-06-19 NOTE — ED QUICK NOTES
Orders for admission, patient is aware of plan and ready to go upstairs. Any questions, please call ED RN susie  at extension [phone].    Patient presents with:  Hypertension  Hyperglycemia      Patient AO x 3  Ambulation: unsteady gait  Belongings: accompanyi

## 2021-06-19 NOTE — H&P
[address] [facility] [address]  Patient Status:  Observation    1945 MRN [id number]   Location [facility] Attending Whit Bro MD   Hosp Day # 0 PCP Mena Hill MD     Date:  2021  Date COLONOSCOPY N/A 1/12/2017    Procedure: COLONOSCOPY;  Surgeon: Oj Seymour MD;  Location: [address] [place] [address] ENDOSCOPY   • ELECTROCARDIOGRAM, COMPLETE  4/21/2014    Scanned to media tab   • HYSTERECTOMY  2014    TLH/BSO/ A&P repair/ uteroscral spine fixation   • OT MOUTH BID (Patient taking differently: 2 (two) times daily with meals.  Take one tablet by mouth twice daily )  OneTouch UltraSoft Lancets Does not apply Misc, Test glucose 3 times daily  Glucose Blood (ONETOUCH ULTRA) In Vitro Strip, Test glucose 3 times d CA 9.7 06/19/2021    ALB 3.4 10/13/2020    ALKPHO 110 10/13/2020    BILT 0.4 10/13/2020    TP 7.7 10/13/2020    AST 11 (L) 10/13/2020    ALT 24 10/13/2020    PTT 26.9 09/23/2019    INR 1.01 09/23/2019    PT 11.7 (L) 05/05/2015    TSH 1.340 10/13/2020

## 2021-06-19 NOTE — ED PROVIDER NOTES
Patient Seen in: [facility] AND [facility] Emergency Department    History   Patient presents with:  Hypertension  Hyperglycemia    Stated Complaint:     HPI  Pt c/o a 10/10 headache that began last night. Headache diffuse.   This is worse compared  to previous Kamala Leonel Right 5/27/2015    Procedure: ARTHROSCOPY SHOULDER WITH ROTATOR CUFF REPAIR;  Surgeon: Adriana Ledezma MD;  Location: [facility]       Medications :   Meclizine HCl 25 MG Oral Tab,  Take 1 tablet (25 mg total) by NEEDLE ULTRAFINE) 29G X 12.7MM Does not apply Misc,  USE WITH INSULIN PEN AS DIRECTED ONCE DAILY   Lancets Does not apply Misc,  Check blood sugar 2 times daily       Family History   Problem Relation Age of Onset   • Dementia Mother 80        Alzheimer's include tension headache vs. Migraine headache vs. Sinusitis vs. [facility]        ED Course     Labs Reviewed   BASIC METABOLIC PANEL (8) - Abnormal; Notable for the following components:       Result Value    Glucose 233 (*)     Potassium 3.2 (*)     BUN/CREA R type  Vertigo    Disposition:  Discharge    Follow-up:  Allegra Mederos MD  [address] [facility] [address]  [address] [facility]  [phone]            Medications Prescribed:  Current Discharge Medication List    START taking these medications    [facility].

## 2021-06-19 NOTE — ED QUICK NOTES
After attempting to discharge patient, daughter of patient voiced concerns about her mother's balance and dizziness. Er md wilson at bedside to reevaluate patient. Plan is to still discharge patient after medications ordered. Veverly Ivet provided to patient.

## 2021-06-20 ENCOUNTER — APPOINTMENT (OUTPATIENT)
Dept: MRI IMAGING | Facility: HOSPITAL | Age: 76
DRG: 065 | End: 2021-06-20
Attending: HOSPITALIST
Payer: MEDICARE

## 2021-06-20 ENCOUNTER — APPOINTMENT (OUTPATIENT)
Dept: CT IMAGING | Facility: HOSPITAL | Age: 76
DRG: 065 | End: 2021-06-20
Attending: Other
Payer: MEDICARE

## 2021-06-20 PROBLEM — I63.9 ACUTE EMBOLIC STROKE (HCC): Status: ACTIVE | Noted: 2021-06-20

## 2021-06-20 PROCEDURE — 70498 CT ANGIOGRAPHY NECK: CPT | Performed by: OTHER

## 2021-06-20 PROCEDURE — 99233 SBSQ HOSP IP/OBS HIGH 50: CPT | Performed by: HOSPITALIST

## 2021-06-20 PROCEDURE — 99223 1ST HOSP IP/OBS HIGH 75: CPT | Performed by: OTHER

## 2021-06-20 PROCEDURE — 70496 CT ANGIOGRAPHY HEAD: CPT | Performed by: OTHER

## 2021-06-20 PROCEDURE — 70551 MRI BRAIN STEM W/O DYE: CPT | Performed by: HOSPITALIST

## 2021-06-20 RX ORDER — HYDRALAZINE HYDROCHLORIDE 20 MG/ML
10 INJECTION INTRAMUSCULAR; INTRAVENOUS EVERY 2 HOUR PRN
Status: DISCONTINUED | OUTPATIENT
Start: 2021-06-20 | End: 2021-06-23

## 2021-06-20 RX ORDER — ASPIRIN 325 MG
325 TABLET, DELAYED RELEASE (ENTERIC COATED) ORAL ONCE
Status: COMPLETED | OUTPATIENT
Start: 2021-06-20 | End: 2021-06-20

## 2021-06-20 RX ORDER — ATORVASTATIN CALCIUM 80 MG/1
80 TABLET, FILM COATED ORAL NIGHTLY
Status: DISCONTINUED | OUTPATIENT
Start: 2021-06-20 | End: 2021-06-23

## 2021-06-20 RX ORDER — LABETALOL HYDROCHLORIDE 5 MG/ML
10 INJECTION, SOLUTION INTRAVENOUS EVERY 10 MIN PRN
Status: DISCONTINUED | OUTPATIENT
Start: 2021-06-20 | End: 2021-06-23

## 2021-06-20 RX ORDER — TRAMADOL HYDROCHLORIDE 50 MG/1
50 TABLET ORAL EVERY 6 HOURS PRN
Status: DISCONTINUED | OUTPATIENT
Start: 2021-06-20 | End: 2021-06-23

## 2021-06-20 NOTE — SLP NOTE
ADULT SWALLOWING EVALUATION    ASSESSMENT    ASSESSMENT/OVERALL IMPRESSION:    This BSE was ordered d/t stroke protocol. Pt on solid/thin liquids at home, with spouse. Pt denies any past swallowing difficulty. No PMH of dysphagia at [facility].  No CXR has been diet orders for solid/thin liquids/no straw/pills in applesauce. BSE results/recommendations discussed with Pt and family; good understanding. Swallowing precautions written on white board in Pt room.           PLAN:    To f/u x1-2 sessions/meals for dyspha Aspiration    Patient/Family Goals: to eat    SWALLOWING HISTORY  Current Diet Consistency: NPO      OBJECTIVE   ORAL MOTOR EXAMINATION  Dentition: Natural;Functional  Symmetry:  (slight (R) sided weakness)  Strength:  (slightly reduced on (R))     Range o

## 2021-06-20 NOTE — PLAN OF CARE
Patient alert and oriented. Patient is Burkinan speaking primarily. Neuro checks done q2 as ordered and NIH done. Speech therapy cleared the patient for a diet. Physical therapy to see. Plan for echo tomorrow.      Problem: Patient Centered Care  Goal: Ramu adequate comfort level or patient's stated pain goal  Description: INTERVENTIONS:  - Encourage pt to monitor pain and request assistance  - Assess pain using appropriate pain scale  - Administer analgesics based on type and severity of pain and evaluate re patient/family/discharge partner in discharge planning  - Arrange for needed discharge resources and transportation as appropriate  - Identify discharge learning needs (meds, wound care, etc)  - Arrange for interpreters to assist at discharge as needed  - Progressing     Problem: SKIN/TISSUE INTEGRITY - ADULT  Goal: Skin integrity remains intact  Description: INTERVENTIONS  - Assess and document risk factors for pressure ulcer development  - Assess and document skin integrity  - Monitor for areas of redness call for assistance with activity based on assessment  Outcome: Progressing  Goal: Achieves maximal functionality and self care  Description: INTERVENTIONS  - Monitor swallowing and airway patency with patient fatigue and changes in neurological status  -

## 2021-06-20 NOTE — PLAN OF CARE
notified patient and Daughter Kody Carpio of MRI results and plan to transfer to telemetry unit per orders from Dr. Taina Pizarro MD. Patient now transported downstairs for CTA brain and carotids,  Neuro assessment completed at 1105, to be completed q 2 hours for physical limitations  - Instruct pt to call for assistance with activity based on assessment  - Modify environment to reduce risk of injury  - Provide assistive devices as appropriate  - Consider OT/PT consult to assist with strengthening/mobility  - Encou Functional Mobility  Goal: Achieve highest/safest level of mobility/gait  Description: Interventions:  - Assess patient's functional ability and stability  - Promote increasing activity/tolerance for mobility and gait  - Educate and engage patient/family i

## 2021-06-20 NOTE — PROGRESS NOTES
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 Will allow for permissive hypertension. Start statin. Patient was non-compliant with taking her NOAC daily. Will ask neuro to see.   Transfer to

## 2021-06-20 NOTE — PROGRESS NOTES
[facility]    Progress Note    Aurora Cabezas Patient Status:  Observation    1945 MRN [id number]   Location [facility]5W Attending Jayant Zelaya MD   Hosp Day # 0 PCP Erlinda Valdez MD       Subjective:   Piter Ards ondansetron HCl **OR** Metoclopramide HCl, PEG 3350    Results:     Recent Labs   Lab 06/19/21  0627 06/20/21  0823   RBC 4.25 4.26   HGB 12.7 12.5   HCT 38.2 38.5   MCV 89.9 90.4   MCH 29.9 29.3   MCHC 33.2 32.5   RDW 12.9 13.4   NEPRELIM 10.64* 5.34   WB scale  -if MRI positive would transfer to CV stroke floor and proceed with echo, carotid imaging   -checked LDL - 107, if not stroke could try lifestyle modifications as her HDL is reassuring, but if she has acute stroke would need statin  -patient non-com

## 2021-06-20 NOTE — SLP NOTE
SPEECH/LANGUAGE/COGNITIVE EVALUATION - INPATIENT    Admission Date: 6/19/2021  Evaluation Date: 06/20/21    Reason for Referral: Stroke protocol    ASSESSMENT & PLAN   ASSESSMENT & IMPRESSION      MRI Brain 6/20/21:  CONCLUSION:     Acute left superior cer No    Interdisciplinary Communication: Discussed with RN    Patient, family and/or caregiver has been informed and has taken part in this evaluation and plan of treatment and have been advised and agree on the findings and goals.       FOLLOW UP  Treatment

## 2021-06-20 NOTE — PLAN OF CARE
Problem: Patient Centered Care  Goal: Patient preferences are identified and integrated in the patient's plan of care  Description: Interventions:  - What would you like us to know as we care for you?  I live at home with my   - Provide timely, com on type and severity of pain and evaluate response  - Implement non-pharmacological measures as appropriate and evaluate response  - Consider cultural and social influences on pain and pain management  - Manage/alleviate anxiety  - Utilize distraction and/ interpreters to assist at discharge as needed  - Consider post-discharge preferences of patient/family/discharge partner  - Complete POLST form as appropriate  - Assess patient's ability to be responsible for managing their own health  - Refer to [facility] document skin integrity  - Monitor for areas of redness and/or skin breakdown  - Initiate interventions, skin care algorithm/standards of care as needed  Outcome: Progressing     Problem: Impaired Functional Mobility  Goal: Achieve highest/safest level of

## 2021-06-20 NOTE — PLAN OF CARE
Problem: Patient/Family Goals  Goal: Patient/Family Short Term Goal  Description: Patient's Short Term Goal: no dizziness, headache    Interventions:   - monitor vital signs   -follow patient safety measures  -medications as per MD, neuro consult  - See retention protocol/standard of care  - Consider collaborating with pharmacy to review patient's medication profile  - Implement strategies to promote bladder emptying  Outcome: Progressing  Note: Rocephin started for UTI per Dr. Jose Velázquez       BP / Airam Ying

## 2021-06-21 ENCOUNTER — APPOINTMENT (OUTPATIENT)
Dept: CV DIAGNOSTICS | Facility: HOSPITAL | Age: 76
DRG: 065 | End: 2021-06-21
Attending: HOSPITALIST
Payer: MEDICARE

## 2021-06-21 PROCEDURE — 99233 SBSQ HOSP IP/OBS HIGH 50: CPT | Performed by: HOSPITALIST

## 2021-06-21 PROCEDURE — 93306 TTE W/DOPPLER COMPLETE: CPT | Performed by: HOSPITALIST

## 2021-06-21 RX ORDER — ASPIRIN 81 MG/1
81 TABLET ORAL ONCE
Status: COMPLETED | OUTPATIENT
Start: 2021-06-21 | End: 2021-06-21

## 2021-06-21 RX ORDER — CALCIUM CARBONATE 200(500)MG
1000 TABLET,CHEWABLE ORAL 2 TIMES DAILY PRN
Status: DISCONTINUED | OUTPATIENT
Start: 2021-06-21 | End: 2021-06-23

## 2021-06-21 RX ORDER — POLYETHYLENE GLYCOL 3350 17 G/17G
17 POWDER, FOR SOLUTION ORAL DAILY
Status: DISCONTINUED | OUTPATIENT
Start: 2021-06-21 | End: 2021-06-23

## 2021-06-21 RX ORDER — METOPROLOL TARTRATE 50 MG/1
50 TABLET, FILM COATED ORAL 2 TIMES DAILY
Status: DISCONTINUED | OUTPATIENT
Start: 2021-06-21 | End: 2021-06-23

## 2021-06-21 RX ORDER — FAMOTIDINE 20 MG/1
20 TABLET ORAL DAILY
Status: DISCONTINUED | OUTPATIENT
Start: 2021-06-21 | End: 2021-06-23

## 2021-06-21 RX ORDER — SODIUM CHLORIDE 9 MG/ML
INJECTION, SOLUTION INTRAVENOUS CONTINUOUS
Status: DISCONTINUED | OUTPATIENT
Start: 2021-06-21 | End: 2021-06-23

## 2021-06-21 RX ORDER — SENNA AND DOCUSATE SODIUM 50; 8.6 MG/1; MG/1
2 TABLET, FILM COATED ORAL DAILY
Status: DISCONTINUED | OUTPATIENT
Start: 2021-06-21 | End: 2021-06-23

## 2021-06-21 RX ORDER — DILTIAZEM HYDROCHLORIDE 120 MG/1
120 CAPSULE, EXTENDED RELEASE ORAL DAILY
Status: DISCONTINUED | OUTPATIENT
Start: 2021-06-21 | End: 2021-06-23

## 2021-06-21 NOTE — PLAN OF CARE
Patient alert and oriented. Remains on IV Fluids. Neuros done q2 her neurology. Tramadol and tylenol given for L sided headache. Educated patient to call for assistance, call light within reach.     Problem: Patient Centered Care  Goal: Patient preferences level or patient's stated pain goal  Description: INTERVENTIONS:  - Encourage pt to monitor pain and request assistance  - Assess pain using appropriate pain scale  - Administer analgesics based on type and severity of pain and evaluate response  - Impleme health  - Refer to Case Management Department for coordinating discharge planning if the patient needs post-hospital services based on physician/LIP order or complex needs related to functional status, cognitive ability or social support system  Outcome: P Problem: SKIN/TISSUE INTEGRITY - ADULT  Goal: Skin integrity remains intact  Description: INTERVENTIONS  - Assess and document risk factors for pressure ulcer development  - Assess and document skin integrity  - Monitor for areas of redness and/or skin b Progressing  Goal: Achieves maximal functionality and self care  Description: INTERVENTIONS  - Monitor swallowing and airway patency with patient fatigue and changes in neurological status  - Encourage and assist patient to increase activity and self care

## 2021-06-21 NOTE — CONSULTS
[facility]  [address]  [phone]          [place] [id number]    Report of Consultation    Krystle Rao Patient Status:  Inpatient     1945 MRN [id number] discovered she was profoundly vertiginous. Reports a room spinning sensation. Saturday morning her family was concerned. Patient was nauseated and vomiting. She could not sit upright.   The ED notes that the patient presented with 10 out of 10 headache family the mnemonic be fast.  Have them teach back to me. Daughter asked that I go through all of her medications and make sure she was seen in the right medications at home. Review and summation of prior records  1.      Prior stroke/TIAs (date, descri TLH/BSO/ A&P repair/ uteroscral spine fixation   • OTHER SURGICAL HISTORY Right 2012    Arthroscopy   • OTHER SURGICAL HISTORY  1995    Open cholecystectomy   • SHLDR ARTHROSCOP,PART ACROMIOPLAS Right 5/27/2015    Procedure: ARTHROSCOPY SHOULDER WITH ROTAT (Medical):       Lack of Transportation (Non-Medical):   Physical Activity:       Days of Exercise per Week:       Minutes of Exercise per Session:   Stress:       Feeling of Stress :   Social Connections:       Frequency of Communication with Friends and Decreased temperature sensation in her left V1-V3No pathological facial asymmetry. No flattening of the nasolabial fold. Arch Shmuel Hearing grossly intact. Tongue midline. No atrophy or fasiculations of the tongue noted. Palate and uvula elevate symmetrically.   Glo Sas Results   Component Value Date/Time    TRIG 87 06/19/2021 06:27 AM    HDL 53 06/19/2021 06:27 AM     (H) 06/19/2021 06:27 AM     Recent Labs   Lab 06/19/21  0627 06/20/21  0823   WBC 12.3* 8.5   HGB 12.7 12.5   HCT 38.2 38.5   .0 301.0     Re always concerning for cerebral edema leading to hydrocephalus and herniation. Low suspicion the patient will develop hydrocephalus or herniation given the size of her infarct.   All strokes will swell, but again low suspicion that this will be profoundly s (AIS).  11. Tx hyperthermia (temperature >38°C) and identify source  12.  Evidence indicates that persistent in-hospital hyperglycemia during the first 24 hours after AIS is associated with worse outcomes than normoglycemia and thus, it is reasonable to lynette

## 2021-06-21 NOTE — CONSULTS
Physician Medicine & Rehabilitation Consult Note         SUBJECTIVE:    Chief Complaint: To assess rehabilitation needs following Mobility and ADL dysfunction s/p Hypertension, unspecified type as per request of Dr. Alondra Bowman. Amaris Childers     History of Present Illn 4/18/2014   • Meniscus tear 2012    Foreign body, meniscus tear   • Osteoporosis screening 11/29/2013    Dexa Scan   • Reflux     Takes OTC meds PRN \"Tums\"   • Type I (juvenile type) diabetes mellitus without mention of complication, not stated as uncont glucose **OR** Glucose-Vitamin C, docusate sodium, ondansetron HCl **OR** Metoclopramide HCl, PEG 3350      Seasonal                OTHER (SEE COMMENTS)    Comment:Trees, pollen, hayfever     Family History   Problem Relation Age of Onset   • Dementia Moth conjunctiva  Cardiovascular: warm, well perfused, RRR  Pulm: breathing comfortably on room air, no respiratory distress, bilateral lungs clear in all lung fields  GI: abdomen soft, non distended, central obesity  Skin: no rashes  Extremities: no lower extr rapid ventricular response (HCC)     Non-rheumatic mitral regurgitation     Patellofemoral pain syndrome, unspecified laterality     Bilateral primary osteoarthritis of knee     Lumbar stenosis with neurogenic claudication     Chronic bilateral low back pa at least minimum assistance.  Physical Therapy , Speech Therapy  and Occupational Therapy   · This patient should be able to tolerate at least 3 hours per day of skilled therapy, at least 5 days a week, in one or more of the functional domains documented ab

## 2021-06-21 NOTE — OCCUPATIONAL THERAPY NOTE
OCCUPATIONAL THERAPY EVALUATION - INPATIENT     Room Number: 360/496-Q  Evaluation Date: 6/21/2021  Type of Evaluation: Initial       Physician Order: IP Consult to Occupational Therapy  Reason for Therapy: ADL/IADL Dysfunction and Discharge Planning    OC coordinate grasp/release, manipualtion, and fine motor control/bimanual hand tasks. Education Provided: DC recommendations, Safety, POC, DC recommendations, forced use of L hand    General Observations:   The patient's Approx Degree of Impairment: 50.11 SURGICAL; W/ROTATOR CUFF REPAIR Right 5/27/2015    Procedure: ARTHROSCOPY SHOULDER WITH ROTATOR CUFF REPAIR;  Surgeon: Kike Frey MD;  Location: [facility]   • CATARACT EXTRACTION W/  INTRAOCULAR LENS IMPLANT Left 12/12/2019    Dr. Jaci Valladares @  COORDINATION  Gross Motor:Impaired  Fine Motor: Impaired     ACTIVITIES OF DAILY LIVING ASSESSMENT  AM-PAC ‘6-Clicks’ Inpatient Daily Activity Short Form  How much help from another person does the patient currently need…  -   Putting on and taking off

## 2021-06-21 NOTE — CM/SW NOTE
MDO for Home Health eval    Tentative referrals sent in aidin, need to follow up with choice list pending acceptances. PT/OT eval still pending. Plan: Likely home with home health, need to follow up with choice list, will need final orders/f2f.  PT/

## 2021-06-21 NOTE — PROGRESS NOTES
[facility]    Progress Note    Sherryle Simmerfrank Patient Status:  Observation    1945 MRN [id number]   Location [facility]5W Attending Charmayne Cranker, MD   Hosp Day # 1 PCP Sarah Tello MD       Subjective:   James Aquino hydrALAzine HCl, Labetalol HCl, traMADol HCl, acetaminophen, Butalbital-APAP-Caffeine, glucose **OR** Glucose-Vitamin C **OR** dextrose **OR** glucose **OR** Glucose-Vitamin C, docusate sodium, ondansetron HCl **OR** Metoclopramide HCl, PEG 3350    Results changes. A preliminary report was issued by the [address] [place] [address] Radiology teleradiology service. There are no major discrepancies.     Dictated by (CST): Anders Ramirez MD on 6/19/2021 at 7:41 AM     Finalized by (CST): Anders Ramirez MD on 6/19/2021 at 7:48 AM or vascular malformation. VERTEBRAL ARTERIES: RIGHT:  No significant stenosis. No visible aneurysm or vascular malformation. LEFT:  No significant stenosis. No visible aneurysm or vascular malformation. BASILAR ARTERY:  No significant stenosis.    No hemodynamically significant stenosis. The left superior cerebellar artery appears to be grossly patent but appears slightly asymmetrically smaller than the right in may demonstrate some subtle thready flow.     Dictated by (CST): Isaac Lakhani MD on 6/ positive for left superior cerebellar artery stroke   -Hold xarelto,  given 6/20, ASA 81mg given today- resume Xarelto when ok with Neuro  -permissive HTN but HR elevated with a-fib has been challenging     UTI, recurrent and becoming more resistant

## 2021-06-21 NOTE — PHYSICAL THERAPY NOTE
PHYSICAL THERAPY EVALUATION - INPATIENT     Room Number: 520/850-P  Evaluation Date: 6/21/2021  Type of Evaluation: Initial   Physician Order: PT Eval and Treat    Presenting Problem: left SCA cerebellar CVA   Reason for Therapy: Mobility Dysfunction and RECOMMENDATIONS  PT Discharge Recommendations: Acute rehabilitation    PLAN  PT Treatment Plan: Bed mobility; Body mechanics; Patient education; Family education;Gait training;Neuromuscular re-educate;Range of motion;Stoop training;Transfer training;Balance t Location: [address] [place] [address] ENDOSCOPY   • ELECTROCARDIOGRAM, COMPLETE  4/21/2014    Scanned to media tab   • HYSTERECTOMY  2014    TLH/BSO/ A&P repair/ uteroscral spine fixation   • OTHER SURGICAL HISTORY Right 2012    Arthroscopy   • OTHER SURGICAL HISTORY  1995    Ope Standardized Score (AM-PAC Scale): 36.74   CMS Modifier (G-Code): CL    FUNCTIONAL ABILITY STATUS  Gait Assessment   Gait Assistance:  Moderate assistance (x2)  Distance (ft): stand pivot transfer   Assistive Device: None (bilateral HHA )             Bed

## 2021-06-21 NOTE — PLAN OF CARE
Problem: Patient Centered Care  Goal: Patient preferences are identified and integrated in the patient's plan of care  Description: Interventions:  - What would you like us to know as we care for you?  I live at home with my   - Provide timely, com on type and severity of pain and evaluate response  - Implement non-pharmacological measures as appropriate and evaluate response  - Consider cultural and social influences on pain and pain management  - Manage/alleviate anxiety  - Utilize distraction and/ cognitive ability or social support system  Outcome: Progressing     Problem: Altered Communication/Language Barrier  Goal: Patient/Family is able to understand and participate in their care  Description: Interventions:  - Assess communication ability and integrity  - Monitor for areas of redness and/or skin breakdown  - Initiate interventions, skin care algorithm/standards of care as needed  Outcome: Progressing     Problem: Impaired Functional Mobility  Goal: Achieve highest/safest level of mobility/gait and assist patient to increase activity and self care with guidance from PT/OT  - Encourage visually impaired, hearing impaired and aphasic patients to use assistive/communication devices  Outcome: Progressing     A+Ox4, room air, c/o dizziness, fatigue.

## 2021-06-21 NOTE — CM/SW NOTE
06/21/21 1500   CM/SW Referral Data   Referral Source Physician   Reason for Referral Discharge planning   Informant Children   Patient Info   Patient's Mental Status Alert;Oriented   Patient Communication Issues Language barrier   Patient's Home Enviro

## 2021-06-22 ENCOUNTER — APPOINTMENT (OUTPATIENT)
Dept: CT IMAGING | Facility: HOSPITAL | Age: 76
DRG: 065 | End: 2021-06-22
Attending: Other
Payer: MEDICARE

## 2021-06-22 PROCEDURE — 70450 CT HEAD/BRAIN W/O DYE: CPT | Performed by: OTHER

## 2021-06-22 PROCEDURE — 99233 SBSQ HOSP IP/OBS HIGH 50: CPT | Performed by: HOSPITALIST

## 2021-06-22 PROCEDURE — 99233 SBSQ HOSP IP/OBS HIGH 50: CPT | Performed by: OTHER

## 2021-06-22 RX ORDER — POTASSIUM CHLORIDE 1.5 G/1.77G
40 POWDER, FOR SOLUTION ORAL EVERY 4 HOURS
Status: COMPLETED | OUTPATIENT
Start: 2021-06-22 | End: 2021-06-22

## 2021-06-22 RX ORDER — OLANZAPINE 5 MG/1
5 TABLET, ORALLY DISINTEGRATING ORAL DAILY
Status: DISCONTINUED | OUTPATIENT
Start: 2021-06-22 | End: 2021-06-23

## 2021-06-22 RX ORDER — MAGNESIUM OXIDE 400 MG (241.3 MG MAGNESIUM) TABLET
1 TABLET NIGHTLY PRN
Status: DISCONTINUED | OUTPATIENT
Start: 2021-06-22 | End: 2021-06-23

## 2021-06-22 NOTE — PLAN OF CARE
Problem: Patient Centered Care  Goal: Patient preferences are identified and integrated in the patient's plan of care  Description: Interventions:  - What would you like us to know as we care for you?  I live at home with my   - Provide timely, com on type and severity of pain and evaluate response  - Implement non-pharmacological measures as appropriate and evaluate response  - Consider cultural and social influences on pain and pain management  - Manage/alleviate anxiety  - Utilize distraction and/ cognitive ability or social support system  Outcome: Progressing     Problem: Altered Communication/Language Barrier  Goal: Patient/Family is able to understand and participate in their care  Description: Interventions:  - Assess communication ability and integrity  - Monitor for areas of redness and/or skin breakdown  - Initiate interventions, skin care algorithm/standards of care as needed  Outcome: Progressing     Problem: Impaired Functional Mobility  Goal: Achieve highest/safest level of mobility/gait and changes in neurological status  - Encourage and assist patient to increase activity and self care with guidance from PT/OT  - Encourage visually impaired, hearing impaired and aphasic patients to use assistive/communication devices  Outcome: Progressin

## 2021-06-22 NOTE — PROGRESS NOTES
[facility]    Progress Note    [facility] Patient Status:  Observation    1945 MRN [id number]   Location [facility]5W Attending Zoe Whitley MD   Hosp Day # 2 PCP Tanvi Gonzalez MD       Subjective:   Nadiya Arellano Subcutaneous TID CC and HS   • cefTRIAXone  1 g Intravenous Q24H       Current PRN Inpatient Meds:      Calcium Carbonate Antacid, hydrALAzine HCl, Labetalol HCl, traMADol HCl, acetaminophen, Butalbital-APAP-Caffeine, glucose **OR** Glucose-Vitamin C **OR* Gentamicin <=1 Sensitive      Meropenem <=0.25 Sensitive      Levofloxacin 1 Sensitive      Nitrofurantoin <=16 Sensitive      Piperacillin + Tazobactam <=4 Sensitive      Trimethoprim/Sulfa <=20 Sensitive        Imaging/EKG:   CT BRAIN OR HEAD (08496) INTRACRANIAL ARTERIES:  DISTAL INTERNAL CAROTIDS:  No significant stenosis. No visible aneurysm or vascular malformation. ANTERIOR CEREBRALS: No significant stenosis. No visible aneurysm or vascular malformation.  MIDDLE CEREBRALS:  No significant steno significant mucosal thickening or fluid. ORBITS: Limited views are grossly unremarkable. OTHER: Negative. CONCLUSION:   Left cerebellar hemisphere infarct as noted on preceding MRI. No evidence of acute intracranial hemorrhage.   Mild associated loca on 6/20/2021 at 2:17 PM                   Lab Results   Component Value Date    DDIMER 1.19 (H) 08/10/2018    DDIMER 0.57 (H) 01/14/2011       Assessment and Plan:   Vertigo, gait abnormality, generalized weakness   Acute stroke  H/O a-fib and inconsistent

## 2021-06-22 NOTE — CM/SW NOTE
BPCI Bundled Advanced Medicare Program    Plan of care reviewed for care coordination and discharge planning. Noted pt falls under  BPCI/Medicare program, with DRG Stroke ([address])    [address] [facility] Proposal:  [address] [place] [address]   Pt s/p Acute superior cerebella

## 2021-06-22 NOTE — PROGRESS NOTES
Brief neurology progress note    Recommend scheduled olanzapine 5 mg daily to help with nausea and vomiting. Rate control for A. fib takes precedence over permissive hypertension. Okay to begin to gradually lower blood pressure.     Blood sugars are florinda

## 2021-06-22 NOTE — DIABETES ED
Patient educated regarding diabetes diet basics. Discussed carbohydrate foods, portion sizes, and food label reading. Handouts given in Croatian and initial meal plan provided. Family in room.  Reviewed meals planned at home and how to decrease carb portion

## 2021-06-22 NOTE — OCCUPATIONAL THERAPY NOTE
OCCUPATIONAL THERAPY TREATMENT NOTE - INPATIENT        Room Number: 737/826-P    Presenting Problem: cva    Problem List  Principal Problem:    Hypertension, unspecified type  Active Problems:    Hypertension    Nausea and vomiting, intractability of vomit training; Endurance training;Functional transfer training;ADL training;Balance activities    SUBJECTIVE  Pt reporting decreased dizziness w/ activity this session    OBJECTIVE  Precautions:  needed    WEIGHT BEARING RESTRICTION  Weight Bearing Re

## 2021-06-22 NOTE — CM/SW NOTE
SHAWNA spoke with patient's daughter, Sue Rosario, introduced self and role. SHAWNA notified Sue Rosario that [facility] acute rehab list is available for review.  Sue Rosario states that she is on her way to the hospital and requested that SW leave the list in patient room so

## 2021-06-22 NOTE — PHYSICAL THERAPY NOTE
PHYSICAL THERAPY TREATMENT NOTE - INPATIENT     Room Number: 483/690-D       Presenting Problem: L cerebellar CVA    Problem List  Principal Problem:    Hypertension, unspecified type  Active Problems:    Hypertension    Nausea and vomiting, intractability Treatment Plan: Bed mobility; Endurance; Patient education;Gait training;Strengthening;Family education;Transfer training;Balance training    SUBJECTIVE  \"I feel better than yesterday. \"     OBJECTIVE  Precautions:  needed;Bed/chair alarm (Kristal 7/1/2021  Patient Goal Patient's self-stated goal is: to feel better   Goal #1 Patient is able to demonstrate supine - sit EOB @ level: supervision     Goal #1   Current Status Min A   Goal #2 Patient is able to demonstrate transfers Sit to/from Stand at a

## 2021-06-22 NOTE — PLAN OF CARE
Patient alert and oriented, on RA. Remains on IV Fluids. Up with 2 assist and RC to the bathroom. Complaints of dizziness when getting up. Educated patient to call for assistance, call light within reach.     Problem: Patient Centered Care  Goal: Patient pr adequate comfort level or patient's stated pain goal  Description: INTERVENTIONS:  - Encourage pt to monitor pain and request assistance  - Assess pain using appropriate pain scale  - Administer analgesics based on type and severity of pain and evaluate re their own health  - Refer to Case Management Department for coordinating discharge planning if the patient needs post-hospital services based on physician/LIP order or complex needs related to functional status, cognitive ability or social support system Progressing     Problem: SKIN/TISSUE INTEGRITY - ADULT  Goal: Skin integrity remains intact  Description: INTERVENTIONS  - Assess and document risk factors for pressure ulcer development  - Assess and document skin integrity  - Monitor for areas of redness assessment  Outcome: Progressing  Goal: Achieves maximal functionality and self care  Description: INTERVENTIONS  - Monitor swallowing and airway patency with patient fatigue and changes in neurological status  - Encourage and assist patient to increase ac

## 2021-06-23 VITALS
BODY MASS INDEX: 33 KG/M2 | HEART RATE: 99 BPM | TEMPERATURE: 98 F | WEIGHT: 186.13 LBS | DIASTOLIC BLOOD PRESSURE: 88 MMHG | RESPIRATION RATE: 20 BRPM | OXYGEN SATURATION: 98 % | SYSTOLIC BLOOD PRESSURE: 148 MMHG

## 2021-06-23 PROCEDURE — 99239 HOSP IP/OBS DSCHRG MGMT >30: CPT | Performed by: HOSPITALIST

## 2021-06-23 RX ORDER — ATORVASTATIN CALCIUM 80 MG/1
80 TABLET, FILM COATED ORAL NIGHTLY
Qty: 30 TABLET | Refills: 0 | Status: SHIPPED | OUTPATIENT
Start: 2021-06-23 | End: 2021-07-19

## 2021-06-23 RX ORDER — CIPROFLOXACIN 500 MG/1
500 TABLET, FILM COATED ORAL 2 TIMES DAILY
Qty: 14 TABLET | Refills: 0 | Status: SHIPPED | OUTPATIENT
Start: 2021-06-23 | End: 2021-06-30

## 2021-06-23 NOTE — PROGRESS NOTES
[facility]  [facility], [address] [place] [address]  [phone]        INPATIENT STROKE NEUROLOGY   FOLLOW UP PROGRESS NOTE  [address] Patient Status:  Inpatient     1945 MRN [id number] period. c. Please avoid the use of atenolol as this medication is known to cause blood pressure variability. d. PRN hydralazine and labetalol for sustained pressures outside of goal  2. Neuro checks Q4. Telemetry. NIH stroke scale per protocol.   3. Blood (36.8 °C) 97.8 °F (36.6 °C)   TempSrc: Oral Oral Oral Oral   SpO2: 95% 95% 94% 96%   Weight:          Exam:  - General: appears stated age and no distress  - CV: symmetric pulses    Carotids:   - Pulmonary: no sign of respiratory distress.     Neurologic Ex cefTRIAXone  1 g Intravenous Q24H       PRNS: melatonin, Calcium Carbonate Antacid, hydrALAzine HCl, Labetalol HCl, traMADol HCl, acetaminophen, Butalbital-APAP-Caffeine, glucose **OR** Glucose-Vitamin C **OR** dextrose **OR** glucose **OR** Glucose-Vitami RDW-SD 44.1 35.1 - 46.3 fL    RDW 13.4 11.0 - 15.0 %    .0 150.0 - 450.0 10(3)uL    Neutrophil Absolute Prelim 5.34 1.50 - 7.70 x10 (3) uL    Neutrophil Absolute 5.34 1.50 - 7.70 x10(3) uL    Lymphocyte Absolute 2.24 1.00 - 4.00 x10(3) uL    Monocyt Calculated Osmolality 286 275 - 295 mOsm/kg    GFR, Non- 93 >=60    GFR, -American 107 >=60    ALT 45 13 - 56 U/L    AST 23 15 - 37 U/L    Alkaline Phosphatase 146 (H) 55 - 142 U/L    Bilirubin, Total 0.5 0.1 - 2.0 mg/dL    Total Pro subacute infarction of the left cerebellar hemisphere, in the vascular territory supplied by the left appear cerebral artery. 2. Chronic left occipital infarct in the vascular distribution of the left posterior cerebral artery.   3. Senescent changes of pa

## 2021-06-23 NOTE — PLAN OF CARE
Pt vss overnight,  no c/o pain. Neuro checks q4 hr until 8 AM- then q shift. NIH daily, accucheck ACHS. IV rocephin and 0.9 NS for UTI. Plan for Acute rehab on discharge when cleared.    Problem: Patient Centered Care  Goal: Patient preferences are identifi patient's stated pain goal  Description: INTERVENTIONS:  - Encourage pt to monitor pain and request assistance  - Assess pain using appropriate pain scale  - Administer analgesics based on type and severity of pain and evaluate response  - Implement non-ph partner in discharge planning  - Arrange for needed discharge resources and transportation as appropriate  - Identify discharge learning needs (meds, wound care, etc)  - Arrange for interpreters to assist at discharge as needed  - Consider post-discharge p SKIN/TISSUE INTEGRITY - ADULT  Goal: Skin integrity remains intact  Description: INTERVENTIONS  - Assess and document risk factors for pressure ulcer development  - Assess and document skin integrity  - Monitor for areas of redness and/or skin breakdown  - Progressing  Goal: Achieves maximal functionality and self care  Description: INTERVENTIONS  - Monitor swallowing and airway patency with patient fatigue and changes in neurological status  - Encourage and assist patient to increase activity and self care

## 2021-06-23 NOTE — CM/SW NOTE
Nara Lan can accept today after 12:30pm    Plan: Matthew Rule 1300. PCS completed. [address] room [phone], report [phone].       SANTO Paredes, [place]

## 2021-06-23 NOTE — DISCHARGE SUMMARY
[facility] HOSP - [facility]    Discharge Summary    Sushil Stevenson Patient Status:  Inpatient    1945 MRN [id number]   Location [facility]5W Attending Rosa Velázquez MD   Hosp Day # 3 PCP Gary Emanuel MD     Date of Admission: 2021 extremities.     Hospital Course:    Vertigo, gait abnormality, generalized weakness   Acute stroke  H/O a-fib and inconsistently takes her NOAC  -MRI brain  -PT OT eval rec acute rehab - SW on board  -NIH scale  -MRI positive for left superior cerebellar a food. dilTIAZem HCl 120 MG Oral Tab  Take 1 tablet (120 mg total) by mouth daily. Metoprolol Tartrate 50 MG Oral Tab  Take 1 tablet (50 mg total) by mouth 2 (two) times daily.     Insulin Degludec (TRESIBA FLEXTOUCH) 200 UNIT/ML Subcutaneous Solution Prescription details   metFORMIN HCl 850 MG Tabs  Commonly known as: GLUCOPHAGE  What changed:   · when to take this  · additional instructions      TAKE ONE TABLET BY MOUTH BID   Quantity: 180 tablet  Refills: 1     metoprolol tartrate 50 MG Tabs  Commonl naproxen 250 MG Tabs  Commonly known as: NAPROSYN              Where to Get Your Medications      These medications were sent to [address], [facility] [place], [phone], [phone]

## 2021-06-23 NOTE — PLAN OF CARE
Pt is aox3, is cleared for dc by neuro, plan is for baldev this afternoon. Pt & daughter updated regarding POC. Iv & tel removed, report called to [facility]. Pt being transferred via [employer].    Problem: Patient Centered Care  Goal: Patient preferences a or patient's stated pain goal  Description: INTERVENTIONS:  - Encourage pt to monitor pain and request assistance  - Assess pain using appropriate pain scale  - Administer analgesics based on type and severity of pain and evaluate response  - Implement non in discharge planning  - Arrange for needed discharge resources and transportation as appropriate  - Identify discharge learning needs (meds, wound care, etc)  - Arrange for interpreters to assist at discharge as needed  - Consider post-discharge preferenc INTEGRITY - ADULT  Goal: Skin integrity remains intact  Description: INTERVENTIONS  - Assess and document risk factors for pressure ulcer development  - Assess and document skin integrity  - Monitor for areas of redness and/or skin breakdown  - Initiate in functionality and self care  Description: INTERVENTIONS  - Monitor swallowing and airway patency with patient fatigue and changes in neurological status  - Encourage and assist patient to increase activity and self care with guidance from PT/OT  - Encourag

## 2021-07-07 ENCOUNTER — TELEPHONE (OUTPATIENT)
Dept: FAMILY MEDICINE CLINIC | Facility: CLINIC | Age: 76
End: 2021-07-07

## 2021-07-07 DIAGNOSIS — I63.9 CEREBELLAR INFARCT (HCC): Primary | ICD-10-CM

## 2021-07-07 NOTE — TELEPHONE ENCOUNTER
Lashawn Austin, would like to know if the doctor can authorize an order for home health, nursing, PT and OT. Lashawn Austin, states that a verbal order is ok also.

## 2021-07-07 NOTE — TELEPHONE ENCOUNTER
I called [address] and [facility] who stated the reason is    Left superior cerebellar infarct    She is faxing over the hospital discharge papers.

## 2021-07-08 NOTE — TELEPHONE ENCOUNTER
Spoke to [facility] [employer] at [place] at [facility] NIXON, informed of message.

## 2021-07-16 ENCOUNTER — OFFICE VISIT (OUTPATIENT)
Dept: NEUROLOGY | Facility: CLINIC | Age: 76
End: 2021-07-16
Payer: MEDICARE

## 2021-07-16 VITALS
WEIGHT: 186 LBS | DIASTOLIC BLOOD PRESSURE: 80 MMHG | HEART RATE: 78 BPM | HEIGHT: 63 IN | SYSTOLIC BLOOD PRESSURE: 110 MMHG | BODY MASS INDEX: 32.96 KG/M2

## 2021-07-16 DIAGNOSIS — I63.542 ACUTE STROKE DUE TO OCCLUSION OF LEFT CEREBELLAR ARTERY (HCC): Primary | ICD-10-CM

## 2021-07-16 PROCEDURE — 99214 OFFICE O/P EST MOD 30 MIN: CPT | Performed by: OTHER

## 2021-07-16 PROCEDURE — 1111F DSCHRG MED/CURRENT MED MERGE: CPT | Performed by: OTHER

## 2021-07-16 RX ORDER — AMOXICILLIN 250 MG
2 CAPSULE ORAL NIGHTLY
COMMUNITY
Start: 2021-07-09 | End: 2021-12-20

## 2021-07-16 RX ORDER — DILTIAZEM HYDROCHLORIDE 120 MG/1
120 CAPSULE, COATED, EXTENDED RELEASE ORAL DAILY
COMMUNITY
Start: 2021-07-09 | End: 2021-08-02

## 2021-07-16 RX ORDER — FAMOTIDINE 20 MG/1
20 TABLET ORAL DAILY
COMMUNITY
Start: 2021-07-10 | End: 2021-12-20

## 2021-07-16 NOTE — PATIENT INSTRUCTIONS
• Cont antithrombotics,  xarelto and high intensity statin, atorvastatin. • Home BP monitoring. Pt instructed to check BP at home in the AM and PM, and bring values to future clinic visits. BP goal is for normotension, < 130/80.   • HbA1c goal <7.0  • LDL- a Stroke     A sudden feeling of weakness on one side of your body may be a sign that you are having a stroke. During a stroke, blood stops flowing to part of the brain. This can damage areas in the brain that control the rest of the body.  A stroke can h the time the symptoms first appeared. Angel last reviewed this educational content on 3/1/2020  © 1886-5868 [employer]. All rights reserved. This information is not intended as a substitute for professional medical care.  Always follow yo professional's instructions. Cambios de humor y depresión después de un ataque cerebral  Después de un ataque cerebral, un paciente puede sentir emociones repentinas o extremas. También es común sentir tristeza y deprimirse.  Estos sentimientos puede your body may be a sign that you are having a stroke. Bienvenido un accidente cerebrovascular, la rosa m abhishek de circular a angel parte del cerebro. [place] puede causar daños en áreas del cerebro que controlan el palma del cuerpo.  Un accidente cerebrovascular p es por “arm weakness”, que significa debilidad en un brazo. Un brazo está débil o entumecido. Cuando la persona levanta ambos brazos al mismo tiempo, kun puede caerse. · S es por “speech difficulty” que significa dificultades para hablar.  Puede notar que loved one to see a psychiatrist or psychologist if he or she has severe depression. · Help the person stay active. Play games, watch TV, take a walk, or listen to music together. · Ask friends to visit if the person is willing to see them.   · Don't disco

## 2021-07-16 NOTE — PROGRESS NOTES
[facility]  [address]  [phone]        Neurology Clinic Follow Up Note    Chief Complaint:  Stroke (6/19/21 Stroke f/u- Patient present today with daughter as .  Patient states she bottle from march  Of xarelto and she had 20 in the bottle       ROS: Pertinent positive and negatives per HPI. All others were reviewed and negative.          Medications:  Current Outpatient Medications   Medication Instructions   • acetaminophen (TYLENO intact. Phrase length and rate are normal.  Could not name Dedo lelo but otherwise no anomia. A few paraphasic errors. No neologisms, or obvious neglect. Some R/L confusion.   - Cranial Nerves: No gaze preference.  Visual fields: Full but patient had marcio Dysarthria: 0   11. Extinction and Inattention: 0    Total:   2       MRS: 1    Most recent lab results:   Reviewed and assessed  No results found for this or any previous visit (from the past 39 hour(s)).     Diagnostic studies:  Performed an independent v dairy products and  decreased consumption of saturated fat.    •          The patient's plan is changed on 07/16/21    Education Provided  Education/Instructions given to: patient and daughter  Barriers to Learning: None  Content: Refer to note above   Eval

## 2021-07-19 ENCOUNTER — OFFICE VISIT (OUTPATIENT)
Dept: FAMILY MEDICINE CLINIC | Facility: CLINIC | Age: 76
End: 2021-07-19
Payer: MEDICARE

## 2021-07-19 VITALS
HEART RATE: 50 BPM | TEMPERATURE: 98 F | SYSTOLIC BLOOD PRESSURE: 116 MMHG | DIASTOLIC BLOOD PRESSURE: 70 MMHG | HEIGHT: 63 IN | BODY MASS INDEX: 32.96 KG/M2 | WEIGHT: 186 LBS

## 2021-07-19 DIAGNOSIS — E78.00 PURE HYPERCHOLESTEROLEMIA: ICD-10-CM

## 2021-07-19 DIAGNOSIS — I48.21 PERMANENT ATRIAL FIBRILLATION (HCC): ICD-10-CM

## 2021-07-19 DIAGNOSIS — I63.9 CEREBELLAR INFARCT (HCC): Primary | ICD-10-CM

## 2021-07-19 PROCEDURE — 99214 OFFICE O/P EST MOD 30 MIN: CPT | Performed by: FAMILY MEDICINE

## 2021-07-19 RX ORDER — ROSUVASTATIN CALCIUM 40 MG/1
40 TABLET, COATED ORAL NIGHTLY
Qty: 90 TABLET | Refills: 3 | Status: SHIPPED | OUTPATIENT
Start: 2021-07-19 | End: 2021-08-02

## 2021-07-19 NOTE — PROGRESS NOTES
7/19/2021  1:07 PM    Selena Dow is a 77year old female. Chief complaint(s): Patient presents with:  Hospital F/U: CVA - 6/23    HPI:     Selena Dow primary complaint is regarding CVA.      Patient is a 43-year-old female with long history of di :   oral hypoglycemic include:  Metformin 1000 mg BID  insulin/injectable :  Treshiba 14 units qhs, Low acting insulin on a sliding scale. Hypoglycemia severity is applicable.  she reports home blood glucose readings have been  (#s) and believes  ha 2014    TLH/BSO/ A&P repair/ uteroscral spine fixation   • OTHER SURGICAL HISTORY Right 2012    Arthroscopy   • OTHER SURGICAL HISTORY  1995    Open cholecystectomy   • SHLDR ARTHROSCOP,PART ACROMIOPLAS Right 5/27/2015    Procedure: ARTHROSCOPY SHOULDER WI Capsule SR 24 Hr Take 120 mg by mouth daily. • Riboflavin 400 MG Oral Cap Take 400 mg by mouth daily.  For migraine prophylaxis 90 capsule 3   • Insulin Degludec (TRESIBA FLEXTOUCH) 200 UNIT/ML Subcutaneous Solution Pen-injector Use 20 units Q hs and 5 Constitutional: Negative for appetite change and fever. Eyes: Negative for visual disturbance. Respiratory: Negative for shortness of breath. Cardiovascular: Negative for chest pain.    Gastrointestinal: Negative for abdominal pain, nausea and vomi mouth daily. , Disp: , Rfl:   •  Riboflavin 400 MG Oral Cap, Take 400 mg by mouth daily.  For migraine prophylaxis, Disp: 90 capsule, Rfl: 3  •  Insulin Degludec (TRESIBA FLEXTOUCH) 200 UNIT/ML Subcutaneous Solution Pen-injector, Use 20 units Q hs and 5 unit on 7/19/2021 ), Disp: 100 g, Rfl: 3         Refills Given today:    Requested Prescriptions     Signed Prescriptions Disp Refills   • Rosuvastatin Calcium (CRESTOR) 40 MG Oral Tab 90 tablet 3     Sig: Take 1 tablet (40 mg total) by mouth nightly.    Stop At

## 2021-07-21 ENCOUNTER — APPOINTMENT (OUTPATIENT)
Dept: GENERAL RADIOLOGY | Facility: HOSPITAL | Age: 76
DRG: 690 | End: 2021-07-21
Attending: EMERGENCY MEDICINE
Payer: MEDICARE

## 2021-07-21 ENCOUNTER — TELEPHONE (OUTPATIENT)
Dept: FAMILY MEDICINE CLINIC | Facility: CLINIC | Age: 76
End: 2021-07-21

## 2021-07-21 ENCOUNTER — HOSPITAL ENCOUNTER (INPATIENT)
Facility: HOSPITAL | Age: 76
LOS: 4 days | Discharge: HOME HEALTH | DRG: 690 | End: 2021-07-26
Attending: EMERGENCY MEDICINE | Admitting: HOSPITALIST
Payer: MEDICARE

## 2021-07-21 ENCOUNTER — NURSE TRIAGE (OUTPATIENT)
Dept: FAMILY MEDICINE CLINIC | Facility: CLINIC | Age: 76
End: 2021-07-21

## 2021-07-21 ENCOUNTER — APPOINTMENT (OUTPATIENT)
Dept: CT IMAGING | Facility: HOSPITAL | Age: 76
DRG: 690 | End: 2021-07-21
Attending: EMERGENCY MEDICINE
Payer: MEDICARE

## 2021-07-21 DIAGNOSIS — I48.91 ATRIAL FIBRILLATION WITH RAPID VENTRICULAR RESPONSE (HCC): ICD-10-CM

## 2021-07-21 DIAGNOSIS — Z79.01 ANTICOAGULATED: ICD-10-CM

## 2021-07-21 DIAGNOSIS — N30.00 ACUTE CYSTITIS WITHOUT HEMATURIA: ICD-10-CM

## 2021-07-21 DIAGNOSIS — G45.9 TIA (TRANSIENT ISCHEMIC ATTACK): Primary | ICD-10-CM

## 2021-07-21 DIAGNOSIS — E87.1 HYPONATREMIA: ICD-10-CM

## 2021-07-21 LAB
ALBUMIN SERPL-MCNC: 3 G/DL (ref 3.4–5)
ALP LIVER SERPL-CCNC: 89 U/L
ALT SERPL-CCNC: 21 U/L
ANION GAP SERPL CALC-SCNC: 8 MMOL/L (ref 0–18)
AST SERPL-CCNC: 23 U/L (ref 15–37)
BASOPHILS # BLD AUTO: 0.02 X10(3) UL (ref 0–0.2)
BASOPHILS NFR BLD AUTO: 0.2 %
BILIRUB DIRECT SERPL-MCNC: 0.1 MG/DL (ref 0–0.2)
BILIRUB SERPL-MCNC: 0.4 MG/DL (ref 0.1–2)
BUN BLD-MCNC: 11 MG/DL (ref 7–18)
BUN/CREAT SERPL: 17.5 (ref 10–20)
CALCIUM BLD-MCNC: 9.4 MG/DL (ref 8.5–10.1)
CHLORIDE SERPL-SCNC: 96 MMOL/L (ref 98–112)
CO2 SERPL-SCNC: 27 MMOL/L (ref 21–32)
CREAT BLD-MCNC: 0.63 MG/DL
DEPRECATED RDW RBC AUTO: 40.7 FL (ref 35.1–46.3)
EOSINOPHIL # BLD AUTO: 0 X10(3) UL (ref 0–0.7)
EOSINOPHIL NFR BLD AUTO: 0 %
ERYTHROCYTE [DISTWIDTH] IN BLOOD BY AUTOMATED COUNT: 12.8 % (ref 11–15)
GLUCOSE BLD-MCNC: 232 MG/DL (ref 70–99)
GLUCOSE BLDC GLUCOMTR-MCNC: 271 MG/DL (ref 70–99)
HCT VFR BLD AUTO: 34.9 %
HGB BLD-MCNC: 12 G/DL
IMM GRANULOCYTES # BLD AUTO: 0.03 X10(3) UL (ref 0–1)
IMM GRANULOCYTES NFR BLD: 0.3 %
LYMPHOCYTES # BLD AUTO: 1.27 X10(3) UL (ref 1–4)
LYMPHOCYTES NFR BLD AUTO: 12.4 %
M PROTEIN MFR SERPL ELPH: 7.4 G/DL (ref 6.4–8.2)
MCH RBC QN AUTO: 29.9 PG (ref 26–34)
MCHC RBC AUTO-ENTMCNC: 34.4 G/DL (ref 31–37)
MCV RBC AUTO: 86.8 FL
MONOCYTES # BLD AUTO: 1.16 X10(3) UL (ref 0.1–1)
MONOCYTES NFR BLD AUTO: 11.3 %
NEUTROPHILS # BLD AUTO: 7.78 X10 (3) UL (ref 1.5–7.7)
NEUTROPHILS # BLD AUTO: 7.78 X10(3) UL (ref 1.5–7.7)
NEUTROPHILS NFR BLD AUTO: 75.8 %
OSMOLALITY SERPL CALC.SUM OF ELEC: 279 MOSM/KG (ref 275–295)
PLATELET # BLD AUTO: 175 10(3)UL (ref 150–450)
POTASSIUM SERPL-SCNC: 3.7 MMOL/L (ref 3.5–5.1)
RBC # BLD AUTO: 4.02 X10(6)UL
SODIUM SERPL-SCNC: 131 MMOL/L (ref 136–145)
WBC # BLD AUTO: 10.3 X10(3) UL (ref 4–11)

## 2021-07-21 PROCEDURE — 70498 CT ANGIOGRAPHY NECK: CPT | Performed by: EMERGENCY MEDICINE

## 2021-07-21 PROCEDURE — 70450 CT HEAD/BRAIN W/O DYE: CPT | Performed by: EMERGENCY MEDICINE

## 2021-07-21 PROCEDURE — 71045 X-RAY EXAM CHEST 1 VIEW: CPT | Performed by: EMERGENCY MEDICINE

## 2021-07-21 PROCEDURE — 99223 1ST HOSP IP/OBS HIGH 75: CPT | Performed by: HOSPITALIST

## 2021-07-21 PROCEDURE — 70496 CT ANGIOGRAPHY HEAD: CPT | Performed by: EMERGENCY MEDICINE

## 2021-07-21 NOTE — TELEPHONE ENCOUNTER
Action Requested: Summary for Provider     []  Critical Lab, Recommendations Needed  [] Need Additional Advice  [x]   FYI    []   Need Orders  [] Need Medications Sent to Pharmacy  []  Other     SUMMARY: Spoke with pt's  and [facility] RN while

## 2021-07-21 NOTE — TELEPHONE ENCOUNTER
Spoke with pt daughter who states for the last two evenings pt had a spike in her blood sugar running in the 260's. Per daughter pt has never had spikes in her blood sugar like this and didn't need the fast acting in rehab.   Per daughter she followed the

## 2021-07-21 NOTE — ED INITIAL ASSESSMENT (HPI)
Care assumed from EMS. Pt recently discharged from SNF s/p stroke, residual L sided weakness. Per EMS, pt has had generalized weakness and hyperglycemia since discharge from SNF. Pt c/o HA. Pt is aox4.

## 2021-07-21 NOTE — TELEPHONE ENCOUNTER
Patient's daughter would like a call back to discuss patient's insulin instructions and discuss her sugar levels. Please advise.

## 2021-07-22 PROBLEM — N30.00 ACUTE CYSTITIS WITHOUT HEMATURIA: Status: ACTIVE | Noted: 2021-07-22

## 2021-07-22 PROBLEM — E87.1 HYPONATREMIA: Status: ACTIVE | Noted: 2021-07-22

## 2021-07-22 PROBLEM — Z79.01 ANTICOAGULATED: Status: ACTIVE | Noted: 2021-07-22

## 2021-07-22 LAB
BILIRUB UR QL: NEGATIVE
COLOR UR: YELLOW
GLUCOSE BLDC GLUCOMTR-MCNC: 118 MG/DL (ref 70–99)
GLUCOSE BLDC GLUCOMTR-MCNC: 143 MG/DL (ref 70–99)
GLUCOSE BLDC GLUCOMTR-MCNC: 145 MG/DL (ref 70–99)
GLUCOSE BLDC GLUCOMTR-MCNC: 182 MG/DL (ref 70–99)
GLUCOSE BLDC GLUCOMTR-MCNC: 222 MG/DL (ref 70–99)
GLUCOSE UR-MCNC: 150 MG/DL
NITRITE UR QL STRIP.AUTO: POSITIVE
PH UR: 6 [PH] (ref 5–8)
PROT UR-MCNC: 30 MG/DL
SP GR UR STRIP: 1.02 (ref 1–1.03)
UROBILINOGEN UR STRIP-ACNC: <2

## 2021-07-22 PROCEDURE — 99233 SBSQ HOSP IP/OBS HIGH 50: CPT | Performed by: HOSPITALIST

## 2021-07-22 RX ORDER — PHENAZOPYRIDINE HYDROCHLORIDE 100 MG/1
100 TABLET, FILM COATED ORAL
Status: COMPLETED | OUTPATIENT
Start: 2021-07-22 | End: 2021-07-24

## 2021-07-22 RX ORDER — METOPROLOL TARTRATE 50 MG/1
50 TABLET, FILM COATED ORAL
Status: DISCONTINUED | OUTPATIENT
Start: 2021-07-22 | End: 2021-07-24

## 2021-07-22 RX ORDER — HYDRALAZINE HYDROCHLORIDE 20 MG/ML
10 INJECTION INTRAMUSCULAR; INTRAVENOUS EVERY 4 HOURS PRN
Status: DISCONTINUED | OUTPATIENT
Start: 2021-07-22 | End: 2021-07-26

## 2021-07-22 RX ORDER — METOPROLOL TARTRATE 50 MG/1
75 TABLET, FILM COATED ORAL NIGHTLY
Status: ON HOLD | COMMUNITY
End: 2021-07-26

## 2021-07-22 RX ORDER — ONDANSETRON 2 MG/ML
4 INJECTION INTRAMUSCULAR; INTRAVENOUS EVERY 6 HOURS PRN
Status: DISCONTINUED | OUTPATIENT
Start: 2021-07-22 | End: 2021-07-26

## 2021-07-22 RX ORDER — ASPIRIN 81 MG/1
324 TABLET, CHEWABLE ORAL ONCE
Status: COMPLETED | OUTPATIENT
Start: 2021-07-22 | End: 2021-07-22

## 2021-07-22 RX ORDER — SENNA AND DOCUSATE SODIUM 50; 8.6 MG/1; MG/1
2 TABLET, FILM COATED ORAL NIGHTLY
Status: DISCONTINUED | OUTPATIENT
Start: 2021-07-22 | End: 2021-07-26

## 2021-07-22 RX ORDER — ZOLPIDEM TARTRATE 5 MG/1
5 TABLET ORAL NIGHTLY PRN
Status: DISCONTINUED | OUTPATIENT
Start: 2021-07-22 | End: 2021-07-26

## 2021-07-22 RX ORDER — CLOPIDOGREL BISULFATE 75 MG/1
300 TABLET ORAL ONCE
Status: COMPLETED | OUTPATIENT
Start: 2021-07-22 | End: 2021-07-22

## 2021-07-22 RX ORDER — DILTIAZEM HYDROCHLORIDE 120 MG/1
120 CAPSULE, EXTENDED RELEASE ORAL DAILY
Status: DISCONTINUED | OUTPATIENT
Start: 2021-07-22 | End: 2021-07-26

## 2021-07-22 RX ORDER — HYDROCODONE BITARTRATE AND ACETAMINOPHEN 5; 325 MG/1; MG/1
1 TABLET ORAL EVERY 6 HOURS PRN
Status: DISCONTINUED | OUTPATIENT
Start: 2021-07-22 | End: 2021-07-26

## 2021-07-22 RX ORDER — FAMOTIDINE 20 MG/1
20 TABLET ORAL DAILY
Status: DISCONTINUED | OUTPATIENT
Start: 2021-07-22 | End: 2021-07-26

## 2021-07-22 RX ORDER — ACETAMINOPHEN 500 MG
500 TABLET ORAL EVERY 6 HOURS PRN
Status: DISCONTINUED | OUTPATIENT
Start: 2021-07-22 | End: 2021-07-26

## 2021-07-22 RX ORDER — DEXTROSE MONOHYDRATE 25 G/50ML
50 INJECTION, SOLUTION INTRAVENOUS
Status: DISCONTINUED | OUTPATIENT
Start: 2021-07-22 | End: 2021-07-26

## 2021-07-22 RX ORDER — ROSUVASTATIN CALCIUM 20 MG/1
40 TABLET, COATED ORAL NIGHTLY
Status: DISCONTINUED | OUTPATIENT
Start: 2021-07-22 | End: 2021-07-26

## 2021-07-22 RX ORDER — METOPROLOL TARTRATE 50 MG/1
50 TABLET, FILM COATED ORAL 2 TIMES DAILY
Status: DISCONTINUED | OUTPATIENT
Start: 2021-07-22 | End: 2021-07-22

## 2021-07-22 RX ORDER — SODIUM CHLORIDE 9 MG/ML
INJECTION, SOLUTION INTRAVENOUS CONTINUOUS
Status: DISCONTINUED | OUTPATIENT
Start: 2021-07-22 | End: 2021-07-23

## 2021-07-22 NOTE — PROGRESS NOTES
[facility]    Progress Note    Verna Zapata Patient Status:  Inpatient    1945 MRN [id number]   Location [facility] 1W Attending Marleen Gavin MD   Hosp Day # 0 PCP Tanvi Gonzalez MD       Subjective:     Still ve Continue Xarelto for now (might need to change if confirmed recurrent embolic stroke)     Acute UTI  Patient started on Rocephin 1 g IV piggyback every 24 hours, will continue same, cultures pending.  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 Follow-up CTA of the head suggested in 6-12 months to demonstrate stability based on clinical discretion and protocol.   Otherwise, no high-grade stenosi

## 2021-07-22 NOTE — ED QUICK NOTES
Pt A&Ox3, reg resp. Pt reports increased weakness today along w/ some right sided weakness, pt's daughter reports left sided weakness from previous cva. Pt placed on cardiac monitor, call light within reach. Pt and family aware of plan of care.

## 2021-07-22 NOTE — CM/SW NOTE
CM self referred case finding for DC planning. Pt recently discharged from [facility] s/p Lt CVA. Pt w/hx [facility]. Pt verified address and lives w/spouse. PT/OT orders in and pending to see pt.     CM/SW to remain available for support and/or dis

## 2021-07-22 NOTE — TELEPHONE ENCOUNTER
Spoke to Pt's daughter , stated Pt was admitted to hosp Dx-UTI/TIA, pt need to see urologist, nurse advised no openings til October but if Dr Don Ralph reach out to Dr Amanda Wayne ,more than likely he will see Pt sooner

## 2021-07-22 NOTE — ED QUICK NOTES
Orders for admission, patient is aware of plan and ready to go upstairs. Any questions, please call ED RN Naya Dillard  at extension [phone].    Type of COVID test sent:n/a vaccinated  COVID Suspicion level: Low    Titratable drug(s) infusing:n/a  Rate:n/a    LOC detailed exam negative

## 2021-07-22 NOTE — ED PROVIDER NOTES
Patient Seen in: [facility] AND [facility] Emergency Department    History   Patient presents with:  Fatigue  Hyperglycemia    Stated Complaint: Weakness/malaise    HPI    71-year-old female with past medical history of hypertension, atrial fibrillation with histo 1/12/2017    Procedure: COLONOSCOPY;  Surgeon: Jah Amaya MD;  Location: [address] [place] [address] ENDOSCOPY   • ELECTROCARDIOGRAM, COMPLETE  4/21/2014    Scanned to media tab   • HYSTERECTOMY  2014    TLH/BSO/ A&P repair/ uteroscral spine fixation   • OTHER SURGICAL HIS DAILY   Metoprolol Tartrate 50 MG Oral Tab,  Take 1 tablet (50 mg total) by mouth 2 (two) times daily. Patient taking differently: Take 50 mg by mouth 2 (two) times daily.  2 tabs daily    Diclofenac Sodium 1 % Transdermal Gel,  Apply 2 g topically 4 (four distress. HEENT: MMM. Head: Normocephalic. Atraumatic. Eyes: No injection. No photophobia. Neck: Neck supple. No meningismus. Cardiovascular: Irregularly irregular. Pulmonary/Chest: Effort normal. CTAB. Abdominal: Soft. Nontender.   Musculoskeletal: -----------         ------                     CBC W/ DIFFERENTIAL[[id number]]          Abnormal            Final result                 Please view results for these tests on the individual orders.    RAINBOW DRAW LAVENDER   RAINBOW DRAW LIGHT GREEN   RAINB For Exam (entered by Technologist):  Generalized left side weakness, recent TIA.   Other Notes (entered by Technologist): [phone]  plain brain/cta head/neck    Additional Information (per Vision Radiologist):      CT head noncontrast    Comparison: 6/2 07/21/2021/DT:  07/22/2021  -------------------------------------end of section----------------------------------------             MDM     DIFFERENTIAL DIAGNOSIS: After history and physical exam differential diagnosis includes but is not limited to CVA, T intervention, complex decision making, and/or extensive discussions with the patient, family, and clinical staff.     We recommend that you schedule follow up care with a primary care provider within the next three months to obtain basic health screening in

## 2021-07-22 NOTE — ED QUICK NOTES
Pt aox3 denies any c/o at this time. Daughter reports that medication is not infusing. Pump functioning but noted IV to be dislodged where medication was infusing.       22g IV in R hand dc'd on patients side.      20g IVB that was in pt's L hand dc'd due t

## 2021-07-22 NOTE — H&P
[address] Patient Status:  Emergency    1945 MRN [id number]   Location [address] Attending Larinda Osgood, MD   Hosp Day # 0 PCP Polly Vann MD     Date:  9 • CATARACT EXTRACTION W/  INTRAOCULAR LENS IMPLANT Left 12/12/2019    Dr. Og Terry @ [facility]   • CATARACT EXTRACTION W/  INTRAOCULAR LENS IMPLANT Right 12/19/2019    Dr. Og Terry @ [address] [place] [address]     • COLONOSCOPY N/A 1/12/2017    Procedure: COLONOSCOPY Needle (BD PEN NEEDLE ULTRAFINE) 29G X 12.7MM Does not apply Misc   No No   Sig: USE WITH INSULIN PEN AS DIRECTED ONCE DAILY   Lancets Does not apply Misc   No No   Sig: Check blood sugar 2 times daily   Meclizine HCl 25 MG Oral Tab   No No   Sig: Take 1 t Negative. Psychiatric:  Negative. ROS reviewed as documented in chart    Physical Exam:  Temp:  [99 °F (37.2 °C)] 99 °F (37.2 °C)  Pulse:  [105-122] 105  Resp:  [11] 11  BP: (132-164)/(77-93) 132/77    General:  Alert and oriented.   Diffuse skin problem medication if heart rate continues to remain elevated will start Cardizem drip. Continue Xarelto    Acute UTI  Patient started on Rocephin 1 g IV piggyback every 24 hours, will continue same, cultures pending.     Hyperlipidemia  Continue statin    Prophyl

## 2021-07-23 ENCOUNTER — TELEPHONE (OUTPATIENT)
Dept: FAMILY MEDICINE CLINIC | Facility: CLINIC | Age: 76
End: 2021-07-23

## 2021-07-23 ENCOUNTER — APPOINTMENT (OUTPATIENT)
Dept: CT IMAGING | Facility: HOSPITAL | Age: 76
DRG: 690 | End: 2021-07-23
Attending: UROLOGY
Payer: MEDICARE

## 2021-07-23 LAB
ALBUMIN SERPL-MCNC: 2.5 G/DL (ref 3.4–5)
ANION GAP SERPL CALC-SCNC: 8 MMOL/L (ref 0–18)
BASOPHILS # BLD AUTO: 0.03 X10(3) UL (ref 0–0.2)
BASOPHILS NFR BLD AUTO: 0.4 %
BUN BLD-MCNC: 7 MG/DL (ref 7–18)
BUN/CREAT SERPL: 14.3 (ref 10–20)
CALCIUM BLD-MCNC: 9 MG/DL (ref 8.5–10.1)
CHLORIDE SERPL-SCNC: 103 MMOL/L (ref 98–112)
CO2 SERPL-SCNC: 29 MMOL/L (ref 21–32)
CREAT BLD-MCNC: 0.49 MG/DL
DEPRECATED RDW RBC AUTO: 41.8 FL (ref 35.1–46.3)
EOSINOPHIL # BLD AUTO: 0.01 X10(3) UL (ref 0–0.7)
EOSINOPHIL NFR BLD AUTO: 0.1 %
ERYTHROCYTE [DISTWIDTH] IN BLOOD BY AUTOMATED COUNT: 12.8 % (ref 11–15)
EST. AVERAGE GLUCOSE BLD GHB EST-MCNC: 189 MG/DL (ref 68–126)
GLUCOSE BLD-MCNC: 93 MG/DL (ref 70–99)
GLUCOSE BLDC GLUCOMTR-MCNC: 111 MG/DL (ref 70–99)
GLUCOSE BLDC GLUCOMTR-MCNC: 115 MG/DL (ref 70–99)
GLUCOSE BLDC GLUCOMTR-MCNC: 183 MG/DL (ref 70–99)
GLUCOSE BLDC GLUCOMTR-MCNC: 227 MG/DL (ref 70–99)
HAV IGM SER QL: 1.8 MG/DL (ref 1.6–2.6)
HBA1C MFR BLD HPLC: 8.2 % (ref ?–5.7)
HCT VFR BLD AUTO: 35 %
HGB BLD-MCNC: 11.8 G/DL
IMM GRANULOCYTES # BLD AUTO: 0.03 X10(3) UL (ref 0–1)
IMM GRANULOCYTES NFR BLD: 0.4 %
INR BLD: 1.46 (ref 0.9–1.2)
LYMPHOCYTES # BLD AUTO: 1.45 X10(3) UL (ref 1–4)
LYMPHOCYTES NFR BLD AUTO: 20.9 %
MCH RBC QN AUTO: 29.9 PG (ref 26–34)
MCHC RBC AUTO-ENTMCNC: 33.7 G/DL (ref 31–37)
MCV RBC AUTO: 88.6 FL
MONOCYTES # BLD AUTO: 0.88 X10(3) UL (ref 0.1–1)
MONOCYTES NFR BLD AUTO: 12.7 %
NEUTROPHILS # BLD AUTO: 4.53 X10 (3) UL (ref 1.5–7.7)
NEUTROPHILS # BLD AUTO: 4.53 X10(3) UL (ref 1.5–7.7)
NEUTROPHILS NFR BLD AUTO: 65.5 %
OSMOLALITY SERPL CALC.SUM OF ELEC: 288 MOSM/KG (ref 275–295)
PHOSPHATE SERPL-MCNC: 2.5 MG/DL (ref 2.5–4.9)
PLATELET # BLD AUTO: 178 10(3)UL (ref 150–450)
POTASSIUM SERPL-SCNC: 3.1 MMOL/L (ref 3.5–5.1)
POTASSIUM SERPL-SCNC: 3.6 MMOL/L (ref 3.5–5.1)
PROTHROMBIN TIME: 17.5 SECONDS (ref 11.8–14.5)
RBC # BLD AUTO: 3.95 X10(6)UL
SODIUM SERPL-SCNC: 140 MMOL/L (ref 136–145)
TSI SER-ACNC: 0.65 MIU/ML (ref 0.36–3.74)
WBC # BLD AUTO: 6.9 X10(3) UL (ref 4–11)

## 2021-07-23 PROCEDURE — 99223 1ST HOSP IP/OBS HIGH 75: CPT | Performed by: OTHER

## 2021-07-23 PROCEDURE — 74176 CT ABD & PELVIS W/O CONTRAST: CPT | Performed by: UROLOGY

## 2021-07-23 PROCEDURE — 99223 1ST HOSP IP/OBS HIGH 75: CPT | Performed by: UROLOGY

## 2021-07-23 PROCEDURE — 99233 SBSQ HOSP IP/OBS HIGH 50: CPT | Performed by: HOSPITALIST

## 2021-07-23 RX ORDER — MAGNESIUM OXIDE 400 MG (241.3 MG MAGNESIUM) TABLET
400 TABLET ONCE
Status: COMPLETED | OUTPATIENT
Start: 2021-07-23 | End: 2021-07-23

## 2021-07-23 RX ORDER — POTASSIUM CHLORIDE 14.9 MG/ML
20 INJECTION INTRAVENOUS ONCE
Status: COMPLETED | OUTPATIENT
Start: 2021-07-23 | End: 2021-07-23

## 2021-07-23 NOTE — PLAN OF CARE
Problem: Patient Centered Care  Goal: Patient preferences are identified and integrated in the patient's plan of care  Description: Interventions:  - What would you like us to know as we care for you?  Sami speaking  - Provide timely, complete, and acc

## 2021-07-23 NOTE — CONSULTS
[facility]    Report of Consultation    Drew Joy Patient Status:  Inpatient    1945 MRN [id number]   Location [facility] 3W/SW Attending Xiomy Rodriguez MD   Hosp Day # 1 PCP Nano Montgomery MD     Date of denies history of any urological surgeries.   Care everywhere in [facility] shows that patient has seen urogynecologist Dr. Yulissa Lockwood; that Dr. Marva Meléndez performed Arden Pedro in the past  6/2/2014 Dr. Yulissa Lockwood performed at [address] [place] [address] vaginal wall suspension with AP repair;  ROTATOR CUFF REPAIR;  Surgeon: Shawna Caban MD;  Location: [facility]      Family History   Problem Relation Age of Onset   • Dementia Mother 80        Alzheimer's Disease   • Cancer Brother 58        Stomach cancer   • Diabetes Brother    • Gl g, Intravenous, Q24H  insulin detemir (LEVEMIR) 100 UNIT/ML flextouch 14 Units, 14 Units, Subcutaneous, Daily  Insulin Aspart Pen (NOVOLOG) 100 UNIT/ML flexpen 4 Units, 4 Units, Subcutaneous, TID CC  Phenazopyridine HCl (PYRIDIUM) tab 100 mg, 100 mg, Oral, supraclavicular or inguinal  adenopathy is noted  Respiratory: normal to inspection lungs are clear to auscultation bilaterally normal respiratory effort  Cardiac: Irregularly irregular rhythm ; normal S, S2 ;   Murmurs--none  Abdomen: soft, non-tender, no Negative Negative   BLOODURINE Negative Small*  --   --   --  Negative Negative Negative Moderate*   NITRITE Positive* Negative POSITIVE POSITIVE positive Negative Negative Negative Positive*   UROBILINOGEN <2.0 <2.0  --   --   --  <2.0 <2.0 <2.0 <2.0   LE Atrial fibrillation   -Right bundle branch block.    ABNORMAL   When compared with ECG of 07/21/2021 18:26:51   No significant changes have occurred   Electronically signed on 07/22/2021 at 10:45 by Sofiya Lee MD    Review of previous records:   Positive E continuing IV ceftriaxone pending sensitivities on 7/21/2021 urine culture positive for E. Coli    2. Recommend bladder scans for postvoid residuals--I write order    3. CT of abdomen pelvis without contrast    4.   I recommend infectious disease consult

## 2021-07-23 NOTE — TELEPHONE ENCOUNTER
She is presently admitted at hospital. Let the [facility] make recommendations and call consultants as they see need.

## 2021-07-23 NOTE — TELEPHONE ENCOUNTER
Spoke with pt's daughter per LETI BISWAS verified  She was informed of MD recommendation and stated understanding. She stated had a stroke last month and was in rehab hosp.    Last week pt had f/u with Dr Irvin Bowman, previous recommendation to pt, she need to s

## 2021-07-23 NOTE — TELEPHONE ENCOUNTER
Evens Osullivan with [facility] is requesting a copy of the appointment notes from 7/19. Evens Osullivan will also be faxing a physician face to face encounter that needs to be filled out and faxed back.      Fax [phone]

## 2021-07-23 NOTE — PHYSICAL THERAPY NOTE
PHYSICAL THERAPY EVALUATION - INPATIENT     Room Number: 330/330-A  Evaluation Date: 7/23/2021  Type of Evaluation: Initial   Physician Order: PT Eval and Treat    Presenting Problem: TIA  Reason for Therapy: Mobility Dysfunction and Discharge Planning Patient will benefit from continued IP PT services to address these deficits in preparation for discharge.     DISCHARGE RECOMMENDATIONS  PT Discharge Recommendations: 24 hour care/supervision;Home with home health PT (continued home health )    PLAN  PT 1/12/2017    Procedure: COLONOSCOPY;  Surgeon: Drake Siemens, MD;  Location: [address] [place] [address] ENDOSCOPY   • ELECTROCARDIOGRAM, COMPLETE  4/21/2014    Scanned to media tab   • HYSTERECTOMY  2014    TLH/BSO/ A&P repair/ uteroscral spine fixation   • OTHER SURGICAL HIS Little   How much help from another person does the patient currently need. ..   -   Moving to and from a bed to a chair (including a wheelchair)?: A Little   -   Need to walk in hospital room?: A Little   -   Climbing 3-5 steps with a railing?: A Little

## 2021-07-23 NOTE — PROGRESS NOTES
[facility]    Progress Note    Lori Oliva Patient Status:  Inpatient    1945 MRN [id number]   Location [facility] 1W Attending Sam Echeverria MD   Hosp Day # 1 PCP Rachel Osorio MD       Subjective:     Still wi hold  -now use sliding scale coverage as needed, check A1c.     Atrial fibrillation with rapid ventricular response  -resumed home medication   -off Cardizem drip.   -Continue Xarelto for now (might need to change if confirmed recurrent embolic stroke)    by (CST): Haydee Nichole MD on 7/22/2021 at 8:51 AM     Finalized by (CST): Haydee Nichole MD on 7/22/2021 at 8:54 AM          XR CHEST AP PORTABLE  (CPT=71045)    Result Date: 7/22/2021  CONCLUSION:   No acute cardiopulmonary abnormality.   Preliminary report

## 2021-07-23 NOTE — OCCUPATIONAL THERAPY NOTE
OCCUPATIONAL THERAPY EVALUATION - INPATIENT     Room Number: 330/330-A  Evaluation Date: 7/23/2021  Type of Evaluation: Initial  Presenting Problem: generalized weakness, hyperglycemia    Physician Order: IP Consult to Occupational Therapy  Reason for Ther return home with home health therapy for home safety evaluation and assist from family. The patient's Approx Degree of Impairment: 42.8% has been calculated based on documentation in the [facility] '6 clicks' Inpatient Daily Activity Short Form.   Research s SHOULDER, SURGI      2015 rotator cufff repain   • ARTHROSCOPY, SHOULDER, SURGICAL; W/ROTATOR CUFF REPAIR Right 5/27/2015    Procedure: ARTHROSCOPY SHOULDER WITH ROTATOR CUFF REPAIR;  Surgeon: Juan Muñoz MD;  Location: [facility]   • CATARACT stimuli  Orientation Level:  oriented x4  Following Commands:  follows all commands and directions without difficulty      RANGE OF MOTION   Upper extremity ROM is within functional limits     STRENGTH ASSESSMENT  Upper extremity strength is within functio

## 2021-07-24 LAB
BASOPHILS # BLD AUTO: 0.04 X10(3) UL (ref 0–0.2)
BASOPHILS NFR BLD AUTO: 0.7 %
DEPRECATED RDW RBC AUTO: 42.5 FL (ref 35.1–46.3)
EOSINOPHIL # BLD AUTO: 0.06 X10(3) UL (ref 0–0.7)
EOSINOPHIL NFR BLD AUTO: 1.1 %
ERYTHROCYTE [DISTWIDTH] IN BLOOD BY AUTOMATED COUNT: 12.9 % (ref 11–15)
GLUCOSE BLDC GLUCOMTR-MCNC: 121 MG/DL (ref 70–99)
GLUCOSE BLDC GLUCOMTR-MCNC: 179 MG/DL (ref 70–99)
GLUCOSE BLDC GLUCOMTR-MCNC: 181 MG/DL (ref 70–99)
GLUCOSE BLDC GLUCOMTR-MCNC: 198 MG/DL (ref 70–99)
HAV IGM SER QL: 2 MG/DL (ref 1.6–2.6)
HCT VFR BLD AUTO: 35.7 %
HGB BLD-MCNC: 11.9 G/DL
IMM GRANULOCYTES # BLD AUTO: 0.02 X10(3) UL (ref 0–1)
IMM GRANULOCYTES NFR BLD: 0.4 %
LYMPHOCYTES # BLD AUTO: 1.15 X10(3) UL (ref 1–4)
LYMPHOCYTES NFR BLD AUTO: 21.2 %
MCH RBC QN AUTO: 29.9 PG (ref 26–34)
MCHC RBC AUTO-ENTMCNC: 33.3 G/DL (ref 31–37)
MCV RBC AUTO: 89.7 FL
MONOCYTES # BLD AUTO: 0.69 X10(3) UL (ref 0.1–1)
MONOCYTES NFR BLD AUTO: 12.7 %
NEUTROPHILS # BLD AUTO: 3.46 X10 (3) UL (ref 1.5–7.7)
NEUTROPHILS # BLD AUTO: 3.46 X10(3) UL (ref 1.5–7.7)
NEUTROPHILS NFR BLD AUTO: 63.9 %
PHOSPHATE SERPL-MCNC: 2.4 MG/DL (ref 2.5–4.9)
PLATELET # BLD AUTO: 204 10(3)UL (ref 150–450)
RBC # BLD AUTO: 3.98 X10(6)UL
WBC # BLD AUTO: 5.4 X10(3) UL (ref 4–11)

## 2021-07-24 PROCEDURE — 99233 SBSQ HOSP IP/OBS HIGH 50: CPT | Performed by: HOSPITALIST

## 2021-07-24 RX ORDER — VANCOMYCIN HYDROCHLORIDE 125 MG/1
125 CAPSULE ORAL DAILY
Status: DISCONTINUED | OUTPATIENT
Start: 2021-07-24 | End: 2021-07-26

## 2021-07-24 NOTE — CONSULTS
[facility]  [address]  [phone] [address]    Report of Consultation  Matt Dobbins Patient Status:  Inpatient     1945 MRN [id number] right-sided pyelonephritis. She is on antibiotics. I called that Silvioistan and reviewed the history and reassured her that my suspicion for stroke was low. Explained the same to the patient. Review and summation of prior records  1.      ROS:  Pertinent pos WITH ROTATOR CUFF REPAIR;  Surgeon: Kike Frey MD;  Location: [facility]       Family History   Problem Relation Age of Onset   • Dementia Mother 80        Alzheimer's Disease   • Cancer Brother 58        Stomach cancer   • Diabetes Brother Communication with Friends and Family:       Frequency of Social Gatherings with Friends and Family:       Attends Voodoo Services:       Active Member of Clubs or Organizations:       Attends Club or Organization Meetings:       Marital Status:   Intim L4 S1   R 2+ 2+  2+    L 2+ 2+  2+       - Sensory:   Light touch: intact  Temperature: intact head CT CTA of the head and neck  Vibration: intact    - Cerebellum: No truncal ataxia. No titubations. Left-sided dysmetria.   She has dysmetria on finger-to-no 850 MG Oral Tab TAKE ONE TABLET BY MOUTH BID   • metoprolol tartrate (LOPRESSOR) 50 mg, Oral, 2 times daily   • metoprolol tartrate (LOPRESSOR) 75 mg, Oral, Nightly   • OneTouch UltraSoft Lancets Does not apply Misc Test glucose 3 times daily   • Aron Anion Gap 8 0 - 18 mmol/L    BUN 11 7 - 18 mg/dL    Creatinine 0.63 0.55 - 1.02 mg/dL    BUN/CREA Ratio 17.5 10.0 - 20.0    Calcium, Total 9.4 8.5 - 10.1 mg/dL    Calculated Osmolality 279 275 - 295 mOsm/kg    GFR, Non- 88 >=60    GFR, Afr Urobilinogen Urine <2.0 <2.0    Nitrite Urine Positive (A) Negative    Leukocyte Esterase Urine Small (A) Negative    WBC Urine 21-50 (A) 0 - 5 /HPF    RBC Urine 3-5 (A) 0 - 2 /HPF    Bacteria Urine 1+ (A) None Seen /HPF   Urine Culture, Routine    Collect mIU/mL   CBC W/ DIFFERENTIAL    Collection Time: 07/23/21  5:22 AM   Result Value Ref Range    WBC 6.9 4.0 - 11.0 x10(3) uL    RBC 3.95 3.80 - 5.30 x10(6)uL    HGB 11.8 (L) 12.0 - 16.0 g/dL    HCT 35.0 35.0 - 48.0 %    MCV 88.6 80.0 - 100.0 fL    MCH 29.9 intracranial abnormality. Remote left posterior circulation infarct, evolved since 6/22/2021. Preliminary report was submitted by the [facility] teleradiologist and there are no significant discrepancies.      Dictated by (CST): Jaci Clark MD on 7/22/2021 a Otherwise, no high-grade stenosis or focal vascular cut off within the neck or intracranial vasculature. Preliminary report was submitted by the [facility] teleradiologist and there are no significant discrepancies.      Dictated by (CST): Veronica Solis MD on 7 BP monitoring. Pt instructed to check BP at home in the AM and PM, and bring values to future clinic visits. BP goal is for normotension, < 130/80. HbA1c goal <7.0  LDL-C goal  <70 mg/dL.    Frequent exercise as per AHA/ASA recs (30 min of aerobic exercise

## 2021-07-24 NOTE — PLAN OF CARE
Urine cultures came back positive for ESBL today. Contact isolation initiated. Infectious disease consulted.  Patient up the chair for meals and   Problem: Diabetes/Glucose Control  Goal: Glucose maintained within prescribed range  Description: INTERVENTION Instruct patient/family to call for assistance with activity based on assessment  7/24/2021 1459 by Jonas Grant, RN  Outcome: Progressing  7/24/2021 1459 by Jonas Grant, RN  Outcome: Progressing  Goal: Achieves maximal functionality and self care  Vicente Weiss

## 2021-07-24 NOTE — PLAN OF CARE
Pt alert. Hx. Cva resudiual to the left side (weakness). C/o of right sided weakness. Neuro WNL. Doing post void bladder scan to see if pt retaining. Ct of abd done. Urology consulted . Will continue to monitor.   Problem: Diabetes/Glucose Control  Goal: Gl functionality and self care  Description: INTERVENTIONS  - Monitor swallowing and airway patency with patient fatigue and changes in neurological status  - Encourage and assist patient to increase activity and self care with guidance from PT/OT  - Encourag

## 2021-07-24 NOTE — TELEPHONE ENCOUNTER
Progress notes for 7/19 and 20 [facility] #[id number] signed by Dr. Shae Ramirez and faxed to number below [phone] also faxed to number on order [phone] with both confirmations received.

## 2021-07-24 NOTE — PROGRESS NOTES
[facility]    Progress Note    Lynn Merrill Patient Status:  Inpatient    1945 MRN [id number]   Location [facility] Attending Angella Gomez MD   Hosp Day # 2 PCP Morales Orlando MD       Subjective:     Fever re discharge  -now use Levemir, AC insulin plus sliding scale coverage as needed,      Atrial fibrillation with rapid ventricular response  -resumed home medication   -off Cardizem drip.   -Continue Xarelto   -Increase dose of metoprolol as rate still variabl calculus identified. There is mild circumferential urinary bladder wall thickening. As a constellation, these findings suggest cystitis with right ascending urinary tract infection/pyelonephritis. 2. No left hydronephrosis or urinary tract calculus.  3. C

## 2021-07-25 LAB
GLUCOSE BLDC GLUCOMTR-MCNC: 134 MG/DL (ref 70–99)
GLUCOSE BLDC GLUCOMTR-MCNC: 150 MG/DL (ref 70–99)
GLUCOSE BLDC GLUCOMTR-MCNC: 263 MG/DL (ref 70–99)
GLUCOSE BLDC GLUCOMTR-MCNC: 265 MG/DL (ref 70–99)
PHOSPHATE SERPL-MCNC: 3.1 MG/DL (ref 2.5–4.9)

## 2021-07-25 PROCEDURE — 99233 SBSQ HOSP IP/OBS HIGH 50: CPT | Performed by: HOSPITALIST

## 2021-07-25 RX ORDER — SULFAMETHOXAZOLE AND TRIMETHOPRIM 800; 160 MG/1; MG/1
1 TABLET ORAL 2 TIMES DAILY
Qty: 24 TABLET | Refills: 0 | Status: SHIPPED | OUTPATIENT
Start: 2021-07-25 | End: 2021-08-06

## 2021-07-25 NOTE — PROGRESS NOTES
[address] [place] [address] Patient Status:  Inpatient    1945 MRN [id number]   Location [facility] 3W/SW Attending Rufina Ramirez MD   Hosp Day # 3 PCP MD Russel Chiu (degenerative joint disease), lumbosacral     Chronic pain of left knee     Facet arthropathy, lumbar     Bodies, loose, joint, knee, left     Effusion of left knee joint     Type 2 diabetes mellitus without retinopathy (HCC)     Floaters, bilateral     Ac

## 2021-07-25 NOTE — PROGRESS NOTES
[facility]    Progress Note    Phuc Moment Patient Status:  Inpatient    1945 MRN [id number]   Location [facility] Attending Blu Aquino MD   Hosp Day # 3 PCP Blaine Barnhart MD       Subjective:     Fever re 8.2  -oral hypoglycemics on hold--> restart on discharge  -now use Levemir, AC insulin plus sliding scale coverage as needed,      Atrial fibrillation with rapid ventricular response  -resumed home medication   -off Cardizem drip.   -Continue Xarelto   -In 08/10/2018    MG 2.0 07/24/2021    PHOS 3.1 07/25/2021    TROP 0.01 08/10/2018       CT ABDOMEN+PELVIS KIDNEYSTONE 2D RNDR(NO IV,NO ORAL)(CPT=74176)    Result Date: 7/23/2021  CONCLUSION:  1.  Right perinephric and periureteric inflammatory stranding with s

## 2021-07-26 ENCOUNTER — TELEPHONE (OUTPATIENT)
Dept: SURGERY | Facility: CLINIC | Age: 76
End: 2021-07-26

## 2021-07-26 VITALS
SYSTOLIC BLOOD PRESSURE: 122 MMHG | OXYGEN SATURATION: 96 % | DIASTOLIC BLOOD PRESSURE: 82 MMHG | BODY MASS INDEX: 32 KG/M2 | RESPIRATION RATE: 16 BRPM | HEART RATE: 105 BPM | TEMPERATURE: 98 F | WEIGHT: 179.5 LBS

## 2021-07-26 DIAGNOSIS — N39.0 RECURRENT UTI: Primary | ICD-10-CM

## 2021-07-26 LAB
GLUCOSE BLDC GLUCOMTR-MCNC: 186 MG/DL (ref 70–99)
GLUCOSE BLDC GLUCOMTR-MCNC: 247 MG/DL (ref 70–99)

## 2021-07-26 PROCEDURE — 99233 SBSQ HOSP IP/OBS HIGH 50: CPT | Performed by: UROLOGY

## 2021-07-26 PROCEDURE — 99239 HOSP IP/OBS DSCHRG MGMT >30: CPT | Performed by: HOSPITALIST

## 2021-07-26 RX ORDER — METOPROLOL TARTRATE 75 MG/1
75 TABLET, FILM COATED ORAL
Qty: 60 TABLET | Refills: 0 | Status: SHIPPED | OUTPATIENT
Start: 2021-07-26 | End: 2021-08-02

## 2021-07-26 RX ORDER — INSULIN DEGLUDEC 200 U/ML
14 INJECTION, SOLUTION SUBCUTANEOUS
Status: SHIPPED | COMMUNITY
Start: 2021-07-26 | End: 2022-01-05

## 2021-07-26 RX ORDER — VANCOMYCIN HYDROCHLORIDE 125 MG/1
125 CAPSULE ORAL DAILY
Qty: 12 CAPSULE | Refills: 0 | Status: SHIPPED | OUTPATIENT
Start: 2021-07-27 | End: 2021-08-08

## 2021-07-26 NOTE — PLAN OF CARE
Problem: NEUROLOGICAL - ADULT  Goal: Achieves stable or improved neurological status  Description: INTERVENTIONS  - Assess for and report changes in neurological status  - Initiate measures to prevent increased intracranial pressure  - Maintain blood p

## 2021-07-26 NOTE — TELEPHONE ENCOUNTER
Urology staff,    Please call daughter Rosa Elena us [phone] and schedule office cystoscopy, no biopsy for 3 weeks or 4 weeks from now;  1. Patient to continue rifaximin/Xarelto and NOT stop it  2.   Should eat breakfast and lunch that day since we are no

## 2021-07-26 NOTE — DISCHARGE SUMMARY
[facility]    Discharge Summary    Ernesto Soto Patient Status:  Inpatient    1945 MRN [id number]   Location [facility] 3W/SW Attending Yvan Cruz MD   Hosp Day # 4 PCP Anaid Delgado MD     Date of Admission: Bodies, loose, joint, knee, left     Effusion of left knee joint     Type 2 diabetes mellitus without retinopathy (HCC)     Floaters, bilateral     Acute pain of both shoulders     Hypertension, unspecified type     Nausea and vomiting, intractability of v to normal, L side weakness is improving  Pt and family stresses compliance with medications  -on xarelto  -statins  -Neurology has been consulted, d/w Dr. Danya Lacy no need for further work-up  -CTA head and neck negative for acute findings, consider need UROLOGY  Why: or as directed   Contact information:  [address]             Zachary Sena DO In 1 month.     Specialty: NEUROLOGY  Contact information:  [address] [place] [address]  [phone] * OneTouch UltraSoft Lancets Misc  Test glucose 3 times daily     Meclizine HCl 25 MG Tabs  Commonly known as: ANTIVERT  Take 1 tablet (25 mg total) by mouth 3 (three) times daily as needed.      OneTouch Ultra Strp  Generic drug: Glucose Blood  Test gluc minutes spent on preparation and coordination of this discharge

## 2021-07-26 NOTE — PROGRESS NOTES
Julio César Brown is a 77year old female.     HPI:   Patient presents with:  Fatigue  Hyperglycemia    History is obtained from patient; from review of records     71-year-old female, atrial fibrillation, hypertension, insulin-dependent diabetes mellitus, rec suspension with AP repair; Dr. Cabrera Chew note states that sling was NOT performed       HISTORY:  Past Medical History:   Diagnosis Date   • Cataract 2009   • Cholelithiasis    • Cold with flu    • Cup to disc asymmetry 2009   • Diabetes mellitus ([facility] 2009 • Diabetes Brother    • Glaucoma Neg    • Macular degeneration Neg       Social History: Social History    Tobacco Use      Smoking status: Former Smoker        Types: Cigarettes        Quit date:         Years since quittin.5      Smokeless tob insulin detemir (LEVEMIR) 100 UNIT/ML flextouch 14 Units, 14 Units, Subcutaneous, Daily  •  Insulin Aspart Pen (NOVOLOG) 100 UNIT/ML flexpen 4 Units, 4 Units, Subcutaneous, TID CC    Allergies:    Seasonal                OTHER (SEE COMMENTS)    Comment:Tio Negative Negative  --   --   --  Negative Negative 20 * Trace*   BILUR Negative Negative  --   --   --  Negative Negative Negative Negative   BLOODURINE Negative Small*  --   --   --  Negative Negative Negative Moderate*   NITRITE Positive* Negative POSITI the right renal collecting system. No obstructing calculus identified. There is mild circumferential urinary bladder wall thickening. As a constellation, these   findings suggest cystitis with right ascending urinary tract infection/pyelonephritis.   2. Destin Cohn MD       Review of previous records:   Positive E. coli urine cultures on 7/21/2029 and 6/19/2021 and 2/20/2021 and 11/30/2020 and 1/21/2020         Care everywhere in [facility] shows that patient has seen urogynecologist Dr. Salvatore Fowler; that Dr. Dwaine Marie on rivaroxaban) to rule out any anatomical abnormalities of the bladder which might predispose to recurrent E. coli UTI. I explained to them the benefits, risks and alternatives and they understand and want to proceed.   While evaluating patient, I coordin

## 2021-07-26 NOTE — PROGRESS NOTES
[address] [place] [address] Patient Status:  Inpatient    1945 MRN [id number]   Location [facility] 3W/SW Attending Dotty Dozier MD   Hosp Day # 4 PCP MD Aurora Balderas lumbosacral     Chronic pain of left knee     Facet arthropathy, lumbar     Bodies, loose, joint, knee, left     Effusion of left knee joint     Type 2 diabetes mellitus without retinopathy (HCC)     Floaters, bilateral     Acute pain of both shoulders

## 2021-07-26 NOTE — CM/SW NOTE
07/26/21 1100   CM/SW Referral Data   Referral Source    Reason for Referral Discharge planning;Readmission   Informant Spouse; Other  (daughter Russel Brambila)   Patient Info   Patient's Mental Status Alert;Oriented   Patient's [address]

## 2021-07-26 NOTE — DISCHARGE PLANNING
Patient's daughter Karla Mock was provided with discharge instructions, education, and follow up information by telephone. Printed prescriptions were called in to patient's pharmacy [facility] in [place].  Prescriptions were already sent electronically to pa

## 2021-07-26 NOTE — CM/SW NOTE
07/26/21 1600   Discharge disposition   Expected discharge disposition Home-Health   Name of Enedina Gill 336  ([address] [facility])   Discharge transportation Private car

## 2021-07-26 NOTE — PLAN OF CARE
Problem: Patient Centered Care  Goal: Patient preferences are identified and integrated in the patient's plan of care  Description: Interventions:  - What would you like us to know as we care for you?  Polish speaking  Problem: Patient/Family Goals  Goal with activity based on assessment  Outcome: Progressing  Goal: Achieves maximal functionality and self care  Description: INTERVENTIONS  - Monitor swallowing and airway patency with patient fatigue and changes in neurological status  - Encourage and assist

## 2021-07-27 ENCOUNTER — TELEPHONE (OUTPATIENT)
Dept: FAMILY MEDICINE CLINIC | Facility: CLINIC | Age: 76
End: 2021-07-27

## 2021-07-27 NOTE — TELEPHONE ENCOUNTER
Called pt's daughter, verified pts name and  I proceeded to assist in scheduling pt for in office cystoscopy per Dr. Verna Mckeon I also reviewed the message going over the instructions for the procedure which she understood.  She is aware that they need to rowan

## 2021-07-27 NOTE — TELEPHONE ENCOUNTER
Applied monitoring devices (EKG, BP, and pulse ox) onto Patient.  Call light within reach.    [facility] order [id number] signed by Dr. Ortiz Tan and faxed to [phone] with confirmation received.

## 2021-07-28 ENCOUNTER — PATIENT OUTREACH (OUTPATIENT)
Dept: CASE MANAGEMENT | Age: 76
End: 2021-07-28

## 2021-07-28 DIAGNOSIS — N39.0 E. COLI URINARY TRACT INFECTION: ICD-10-CM

## 2021-07-28 DIAGNOSIS — M17.0 BILATERAL PRIMARY OSTEOARTHRITIS OF KNEE: ICD-10-CM

## 2021-07-28 DIAGNOSIS — G45.9 TIA (TRANSIENT ISCHEMIC ATTACK): ICD-10-CM

## 2021-07-28 DIAGNOSIS — I48.91 ATRIAL FIBRILLATION, NEW ONSET (HCC): ICD-10-CM

## 2021-07-28 DIAGNOSIS — N30.00 ACUTE CYSTITIS WITHOUT HEMATURIA: ICD-10-CM

## 2021-07-28 DIAGNOSIS — Z02.9 ENCOUNTERS FOR UNSPECIFIED ADMINISTRATIVE PURPOSE: ICD-10-CM

## 2021-07-28 DIAGNOSIS — I48.91 ATRIAL FIBRILLATION WITH RAPID VENTRICULAR RESPONSE (HCC): ICD-10-CM

## 2021-07-28 DIAGNOSIS — B96.20 E. COLI URINARY TRACT INFECTION: ICD-10-CM

## 2021-07-28 DIAGNOSIS — E11.9 TYPE 2 DIABETES MELLITUS WITHOUT RETINOPATHY (HCC): ICD-10-CM

## 2021-07-28 PROCEDURE — 1111F DSCHRG MED/CURRENT MED MERGE: CPT

## 2021-07-28 NOTE — PROGRESS NOTES
Initial Post Discharge Follow Up   Discharge Date: 7/26/21  Contact Date: 7/28/2021    Consent Verification:  Assessment Completed With: Patient  Spouse: Elissa Has received per patient?  written  HIPAA Verified?   Yes    Discharge Dx:    TIA (tr this time.    • Do you have any pain since discharge?  yes   If Yes:  o Where: knee pain bilat, right worse than right   Rating on pain scale 1-10, 10 being the worst pain you have ever experienced: not rated  o Alleviating factors: Voltaren   o Aggravating 2 tablets by mouth nightly. • dilTIAZem 120 MG Oral Capsule SR 24 Hr Take 120 mg by mouth daily. • Riboflavin 400 MG Oral Cap Take 400 mg by mouth daily.  For migraine prophylaxis 90 capsule 3   • Meclizine HCl 25 MG Oral Tab Take 1 tablet (25 mg show missing anything? yes  • Are there any reasons that keep you from taking your medication as prescribed? No  Are you having any concerns with constipation?  No   NCM reviewed medications with Nicholas Warren patient's  as the  felt she seemed confus Clinic, [facility], [address] [facility]  [address], [address] [place] [id number]

## 2021-08-02 ENCOUNTER — OFFICE VISIT (OUTPATIENT)
Dept: FAMILY MEDICINE CLINIC | Facility: CLINIC | Age: 76
End: 2021-08-02
Payer: MEDICARE

## 2021-08-02 VITALS
DIASTOLIC BLOOD PRESSURE: 74 MMHG | SYSTOLIC BLOOD PRESSURE: 110 MMHG | WEIGHT: 167 LBS | HEIGHT: 64 IN | BODY MASS INDEX: 28.51 KG/M2 | HEART RATE: 81 BPM

## 2021-08-02 DIAGNOSIS — I63.9 CEREBELLAR INFARCT (HCC): Primary | ICD-10-CM

## 2021-08-02 DIAGNOSIS — I48.21 PERMANENT ATRIAL FIBRILLATION (HCC): ICD-10-CM

## 2021-08-02 DIAGNOSIS — N10 ACUTE PYELONEPHRITIS: ICD-10-CM

## 2021-08-02 LAB
APPEARANCE: CLEAR
BILIRUBIN: NEGATIVE
GLUCOSE (URINE DIPSTICK): NEGATIVE MG/DL
KETONES (URINE DIPSTICK): NEGATIVE MG/DL
LEUKOCYTES: NEGATIVE
MULTISTIX LOT#: NORMAL NUMERIC
NITRITE, URINE: NEGATIVE
OCCULT BLOOD: NEGATIVE
PH, URINE: 6 (ref 4.5–8)
PROTEIN (URINE DIPSTICK): NEGATIVE MG/DL
SPECIFIC GRAVITY: 1.01 (ref 1–1.03)
URINE-COLOR: YELLOW
UROBILINOGEN,SEMI-QN: 0.2 MG/DL (ref 0–1.9)

## 2021-08-02 PROCEDURE — 81003 URINALYSIS AUTO W/O SCOPE: CPT | Performed by: FAMILY MEDICINE

## 2021-08-02 PROCEDURE — 99496 TRANSJ CARE MGMT HIGH F2F 7D: CPT | Performed by: FAMILY MEDICINE

## 2021-08-02 PROCEDURE — 1111F DSCHRG MED/CURRENT MED MERGE: CPT | Performed by: FAMILY MEDICINE

## 2021-08-02 RX ORDER — PEN NEEDLE, DIABETIC 29 G X1/2"
NEEDLE, DISPOSABLE MISCELLANEOUS
Qty: 100 EACH | Refills: 0 | Status: SHIPPED | OUTPATIENT
Start: 2021-08-02 | End: 2021-12-11

## 2021-08-02 RX ORDER — METOPROLOL TARTRATE 75 MG/1
75 TABLET, FILM COATED ORAL
Qty: 60 TABLET | Refills: 0 | Status: SHIPPED | OUTPATIENT
Start: 2021-08-02 | End: 2021-09-13

## 2021-08-02 RX ORDER — ROSUVASTATIN CALCIUM 40 MG/1
40 TABLET, COATED ORAL NIGHTLY
Qty: 90 TABLET | Refills: 3 | Status: SHIPPED | OUTPATIENT
Start: 2021-08-02

## 2021-08-02 RX ORDER — BLOOD SUGAR DIAGNOSTIC
STRIP MISCELLANEOUS
Qty: 300 EACH | Refills: 3 | Status: SHIPPED | OUTPATIENT
Start: 2021-08-02

## 2021-08-02 RX ORDER — DILTIAZEM HYDROCHLORIDE 120 MG/1
120 CAPSULE, COATED, EXTENDED RELEASE ORAL DAILY
Qty: 90 CAPSULE | Refills: 2 | Status: SHIPPED | OUTPATIENT
Start: 2021-08-02

## 2021-08-02 NOTE — PROGRESS NOTES
8/2/2021  11:15 AM    Jeffrey Briones is a 77year old female. Chief complaint(s): Patient presents with:  TCM (Transition Of Care Management): [facility] f/u 7/26    HPI:     Jeffrey Briones primary complaint is regarding multiple comlaints.      Rui Thurman and/or Mortality: moderate    Overall Risk:   moderate    Patient seen within 7 days from date of discharge. Patient is a 71-year-old female with history of hypertension, atrial fibrillation and recently developed a stroke / TIA about three weeks ago. uteroscral spine fixation   • OTHER SURGICAL HISTORY Right 2012    Arthroscopy   • OTHER SURGICAL HISTORY  1995    Open cholecystectomy   • SHLDR ARTHROSCOP,PART ACROMIOPLAS Right 5/27/2015    Procedure: ARTHROSCOPY SHOULDER WITH ROTATOR CUFF REPAIR;  Surg • Metoprolol Tartrate 75 MG Oral Tab Take 75 mg by mouth 2x Daily(Beta Blocker). 60 tablet 0   • Rivaroxaban (XARELTO) 20 MG Oral Tab Take 1 tablet (20 mg total) by mouth daily with food.  90 tablet 1   • rosuvastatin (CRESTOR) 40 MG Oral Tab Take 1 table Comment:Trees, pollen, hayfever      ROS:   Review of Systems   Constitutional: Positive for fatigue and fever. Negative for appetite change. Eyes: Negative for visual disturbance. Respiratory: Negative for shortness of breath.     Cardiovascular: Negat Ketones, UA Negative Negative - Trace mg/dL    Spec Gravity 1.015 1.005 - 1.030    Blood Urine Negative Negative    PH Urine 6.0 5.0 - 8.0    Protein Urine Negative Negative - Trace mg/dL    Urobilinogen Urine 0.2 0.2 - 1.0 mg/dL    Nitrite Urine Negative 400 mg by mouth daily. For migraine prophylaxis, Disp: 90 capsule, Rfl: 3  •  metFORMIN HCl 850 MG Oral Tab, TAKE ONE TABLET BY MOUTH BID (Patient taking differently: 1,000 mg 2 (two) times daily with meals.  Take one tablet by mouth twice daily ), Disp: 18 Encounter      URINALYSIS, AUTO, W/O SCOPE    RECOMMENDATIONS given include: increase oral fluid intake, drink cranberry juice, avoid \"holding\" urine. Please, call our office with any questions or concerns.  Notify the doctor if there is a deterioration o

## 2021-08-04 ENCOUNTER — TELEPHONE (OUTPATIENT)
Dept: FAMILY MEDICINE CLINIC | Facility: CLINIC | Age: 76
End: 2021-08-04

## 2021-08-04 NOTE — TELEPHONE ENCOUNTER
[facility] order #[id number]  From [facility] signed by Dr. Henrietta Bright and faxed to [phone] with confirmation received. Copies made for billing.

## 2021-08-10 ENCOUNTER — TELEPHONE (OUTPATIENT)
Dept: FAMILY MEDICINE CLINIC | Facility: CLINIC | Age: 76
End: 2021-08-10

## 2021-08-10 NOTE — TELEPHONE ENCOUNTER
Patient's Daughter is calling and asking if the patient should return to the 850 MG of the Metformin that Dr. Jah Rodriguez prescribed? Or should the patient stay on the 1000 MG that the [address] Staff prescribed at [facility]?  Patient is down to 4 pills

## 2021-08-10 NOTE — TELEPHONE ENCOUNTER
Per TRUE, spoke with pt's daughter LETI verified  She was informed of MD recommendation, she stated understanding. She stated pt takes metformin 850 mg BID, med list updated.

## 2021-08-18 ENCOUNTER — LAB ENCOUNTER (OUTPATIENT)
Dept: LAB | Facility: HOSPITAL | Age: 76
End: 2021-08-18
Attending: UROLOGY
Payer: MEDICARE

## 2021-08-18 ENCOUNTER — TELEPHONE (OUTPATIENT)
Dept: FAMILY MEDICINE CLINIC | Facility: CLINIC | Age: 76
End: 2021-08-18

## 2021-08-18 DIAGNOSIS — N39.0 RECURRENT UTI: ICD-10-CM

## 2021-08-18 PROCEDURE — 87086 URINE CULTURE/COLONY COUNT: CPT

## 2021-08-18 NOTE — TELEPHONE ENCOUNTER
[facility] the [facility] is their now advising Dr vargas suppose to put lab orders in the system, please advise     Patient is waiting.

## 2021-08-18 NOTE — TELEPHONE ENCOUNTER
Called patient- LMTCB    Per Dr. Rubi Shook, he is unsure of which lab orders patient is requesting. Please clarify with patient, if she calls back.

## 2021-08-18 NOTE — TELEPHONE ENCOUNTER
Dr. Alondra Osorio, patient is requesting lab orders to be generated. Patient was seen on 08/02/2021.

## 2021-08-23 ENCOUNTER — PROCEDURE (OUTPATIENT)
Dept: SURGERY | Facility: CLINIC | Age: 76
End: 2021-08-23
Payer: MEDICARE

## 2021-08-23 VITALS — HEART RATE: 77 BPM | DIASTOLIC BLOOD PRESSURE: 69 MMHG | SYSTOLIC BLOOD PRESSURE: 108 MMHG | RESPIRATION RATE: 18 BRPM

## 2021-08-23 DIAGNOSIS — B37.9 CANDIDA ALBICANS INFECTION: ICD-10-CM

## 2021-08-23 DIAGNOSIS — N39.0 RECURRENT UTI: Primary | ICD-10-CM

## 2021-08-23 DIAGNOSIS — N32.89 BLADDER TRABECULATION: ICD-10-CM

## 2021-08-23 DIAGNOSIS — Z87.440 HISTORY OF ACUTE PYELONEPHRITIS: ICD-10-CM

## 2021-08-23 PROCEDURE — 99213 OFFICE O/P EST LOW 20 MIN: CPT | Performed by: UROLOGY

## 2021-08-23 PROCEDURE — 52000 CYSTOURETHROSCOPY: CPT | Performed by: UROLOGY

## 2021-08-23 RX ORDER — CIPROFLOXACIN 500 MG/1
500 TABLET, FILM COATED ORAL ONCE
Status: COMPLETED | OUTPATIENT
Start: 2021-08-23 | End: 2021-08-23

## 2021-08-23 RX ORDER — METHENAMINE HIPPURATE 1000 MG/1
1 TABLET ORAL 2 TIMES DAILY
Qty: 180 TABLET | Refills: 3 | Status: SHIPPED | OUTPATIENT
Start: 2021-08-23

## 2021-08-23 RX ADMIN — CIPROFLOXACIN 500 MG: 500 TABLET, FILM COATED ORAL at 17:44:00

## 2021-08-23 NOTE — PROGRESS NOTES
Per Dr. Ayesha Pickard' Verbal orders;     Placed a 18 Fr oconnor catheter, via sterile technique. Irrigated patient's bladder with 500 ml saline, sent irrigant (return) for culture. Patient tolerated well, oconnor removed.

## 2021-08-23 NOTE — PATIENT INSTRUCTIONS
Kavin Cherry M.D.    1.  If you have bothersome burning pain with urination, take over-the-counter \"Azo\" 95 mg capsule every 8 hours as needed; makes urine of bright fluorescent orange-red; if burning pain is not

## 2021-08-23 NOTE — PROGRESS NOTES
PREOPERATIVE DIAGNOSIS:     Recurrent E. coli UTIs     POSTOP DIAGNOSIS:               The same, no tumors, stones, or CIS of the bladder or bladder neck    PROCEDURE:              Cystoscopy             ANESTHESIA:     2% Xylocaine jelly local;  also see initially started on ceftriaxone; and infectious disease Dr. Yann Pathak switched to IV meropenem once urine culture came back E. coli ESBL resistant to ceftriaxone.  While on IV meropenem, fevers resolved and UTI symptoms of urinary urgency and frequency have 10 to 14 days; in need of office cystoscopy, no biopsy (since patient on rivaroxaban) to rule out any anatomical abnormalities of the bladder         ROS--Patient denies CP or SOB       08/23/21  1500   BP: 108/69   Pulse: 77   Resp: 18         There is no few       IMAGING  07/23/2021 CT ABDOMEN + PELVIS KIDNEYSTONE 2D RNDR = suggest cystitis with right ascending urinary tract infection/pyelonephritis; no left hydronephrosis or urinary tract calculus       Bladder scan performed during 07/21/2021-07/26/2021 alternatives to this treatment option and I answered patient's questions; patient decides to start medication and agrees to submit urine specimen today for UA and urine culture.  I also explained to the patient and her daughter that she will have a standing reviewing chart and labs before entering the room, taking patient's  history, examining patient, reviewing past medical records, current medications, allergies, laboratories and imaging studies with patient, discussing further and future treatment options,

## 2021-08-25 ENCOUNTER — TELEPHONE (OUTPATIENT)
Dept: SURGERY | Facility: CLINIC | Age: 76
End: 2021-08-25

## 2021-08-25 NOTE — TELEPHONE ENCOUNTER
----- Message from NISHANT Younger sent at 8/25/2021 12:01 PM CDT -----  Please let patient know her urine culture is positive for a small amount of bacteria.   This is e coli bacteria but is not as resistant as the previous bacteria she had in [place]

## 2021-08-25 NOTE — TELEPHONE ENCOUNTER
Please call daughter Sonali German back. Please review with her that I already explained results of cystoscopy to patient and Sonali German on 8/23/2021 at the time of the cystoscopy and we already discussed all of this--that is documented in my epic cystoscopy note.

## 2021-08-25 NOTE — TELEPHONE ENCOUNTER
I called pt, with Malay interpretor, Albertina Morris #[id number]. Spoke to Isabela Cartagena, she asks that we call her daughter, Lida Hoffmann [phone], with results. I called and spoke to Blake Coronado her results and recommendations as documented below by [facility].    She vo

## 2021-08-26 ENCOUNTER — TELEPHONE (OUTPATIENT)
Dept: SURGERY | Facility: CLINIC | Age: 76
End: 2021-08-26

## 2021-08-26 NOTE — TELEPHONE ENCOUNTER
I called patient and she stated that someone from [address] [place] [address] called her already. I asked that she please tell her daughter Kristopher Ruelas that we called back and if she still had any question to please call back the office.

## 2021-08-26 NOTE — TELEPHONE ENCOUNTER
S/W pt's dtr Ioana Cody and confirmed her questions about holding the Methenamine till after she completes the Bactrim DS.

## 2021-08-26 NOTE — TELEPHONE ENCOUNTER
----- Message from NISHANT Patel sent at 8/25/2021 12:01 PM CDT -----  Please let patient know her urine culture is positive for a small amount of bacteria.   This is e coli bacteria but is not as resistant as the previous bacteria she had in [place]

## 2021-08-26 NOTE — TELEPHONE ENCOUNTER
Called pt with the assistance of Kyrgyz interpretor Keli Ramsey # [phone] and we s/w pt's spouse Irena Modi who has a signed TRUE on file and I gave him the info in [facility] results msg as stated below and he verbalized understanding and ensures pt's compliance.

## 2021-08-26 NOTE — TELEPHONE ENCOUNTER
Per daughter Linda Ron on hipaa pt was seen yesterdays and was prescribed an antibiotic to be taken for 5 days. Per daughter pt's  received a call with test results and was prescribed another antibiotic to be taken for 7 days.  Per Daughter wants to

## 2021-08-31 ENCOUNTER — TELEPHONE (OUTPATIENT)
Dept: SURGERY | Facility: CLINIC | Age: 76
End: 2021-08-31

## 2021-08-31 NOTE — TELEPHONE ENCOUNTER
-Routed to Dr. Luna Kraus:      *On 8/25/21, pt started Bactrim tabX1, 2Xdly, X5 days for UTI. *Since 8/29/21, pt having pain in lower back radiating to mid-abdomen; \"pain is same as when recently hospitalized. \"      *Had BM 8/30/21; no hematuria; no

## 2021-08-31 NOTE — TELEPHONE ENCOUNTER
Per daughter Win German pt was given rx for a bladder infection and states pt now complains of pain toward her kidney and pain in the front toward her bladder.  Please advise

## 2021-09-01 ENCOUNTER — LAB ENCOUNTER (OUTPATIENT)
Dept: LAB | Facility: HOSPITAL | Age: 76
End: 2021-09-01
Attending: UROLOGY
Payer: MEDICARE

## 2021-09-01 DIAGNOSIS — N39.0 RECURRENT UTI: ICD-10-CM

## 2021-09-01 LAB
BILIRUB UR QL: NEGATIVE
COLOR UR: YELLOW
GLUCOSE UR-MCNC: NEGATIVE MG/DL
HGB UR QL STRIP.AUTO: NEGATIVE
HYALINE CASTS #/AREA URNS AUTO: PRESENT /LPF
KETONES UR-MCNC: NEGATIVE MG/DL
NITRITE UR QL STRIP.AUTO: NEGATIVE
PH UR: 6 [PH] (ref 5–8)
PROT UR-MCNC: 30 MG/DL
SP GR UR STRIP: 1.02 (ref 1–1.03)
UROBILINOGEN UR STRIP-ACNC: <2

## 2021-09-01 PROCEDURE — 81001 URINALYSIS AUTO W/SCOPE: CPT

## 2021-09-01 PROCEDURE — 87086 URINE CULTURE/COLONY COUNT: CPT

## 2021-09-01 NOTE — TELEPHONE ENCOUNTER
Please call patient/daughter back ---    Please have patient use standing order and submit another complete urinalysis and urine culture; to call Friday for the results. ALSO patient to schedule kidney ultrasound [phone]; I will enter order.   ALSO, rossi

## 2021-09-01 NOTE — TELEPHONE ENCOUNTER
-S/w pt' Dtr.  Gail Orlando; pt identity verified with name & .  -I read message from [address] as stated below with Dtr    -Dtr given Central Scheduling contact number.  -Dtr confirms she \"arranges pt's pill container\"  & has adjusted per PVK orders.  -Dtr jacquelin

## 2021-09-02 ENCOUNTER — TELEPHONE (OUTPATIENT)
Dept: SURGERY | Facility: CLINIC | Age: 76
End: 2021-09-02

## 2021-09-02 DIAGNOSIS — N39.0 RECURRENT UTI: Primary | ICD-10-CM

## 2021-09-02 NOTE — TELEPHONE ENCOUNTER
Per MONIKA DIAZ 8/31/21    Please call patient/daughter back ---     Please have patient use standing order and submit another complete urinalysis and urine culture; to call Friday for the results. ALSO patient to schedule kidney ultrasound [phone];  I jacquelin

## 2021-09-10 NOTE — TELEPHONE ENCOUNTER
Please review refill protocol failed/ no protocol  Requested Prescriptions   Pending Prescriptions Disp Refills    metFORMIN 850 MG Oral Tab 180 tablet 0     Sig: TAKE ONE TABLET BY MOUTH BID        Diabetes Medication Protocol Failed - 9/10/2021 11:17 AM

## 2021-09-10 NOTE — TELEPHONE ENCOUNTER
Alana Lam pts daughter calling requesting refill for Metoprolol Tartrate 75 MG Oral Tab    States pt is out. Please advise.

## 2021-09-13 RX ORDER — METOPROLOL TARTRATE 75 MG/1
75 TABLET, FILM COATED ORAL
Qty: 180 TABLET | Refills: 1 | Status: SHIPPED | OUTPATIENT
Start: 2021-09-13

## 2021-09-13 NOTE — TELEPHONE ENCOUNTER
[address] states that the pt. has run out of the Metoprolol med last week.  Please send refill to [facility] in [place] on [address]    Current Outpatient Medications   Medication Sig Dispense Refill   •    0   •    3   •    0   •    3   •    2   • Metoprolol T

## 2021-09-13 NOTE — TELEPHONE ENCOUNTER
Refill passed per [facility] protocol. Requested Prescriptions   Pending Prescriptions Disp Refills    Metoprolol Tartrate 75 MG Oral Tab 60 tablet 0     Sig: Take 75 mg by mouth 2x Daily(Beta Blocker).         Hypertensive Medications Protocol Passe

## 2021-09-20 ENCOUNTER — OFFICE VISIT (OUTPATIENT)
Dept: FAMILY MEDICINE CLINIC | Facility: CLINIC | Age: 76
End: 2021-09-20
Payer: MEDICARE

## 2021-09-20 VITALS
SYSTOLIC BLOOD PRESSURE: 80 MMHG | HEART RATE: 66 BPM | WEIGHT: 180 LBS | HEIGHT: 64 IN | DIASTOLIC BLOOD PRESSURE: 58 MMHG | RESPIRATION RATE: 17 BRPM | BODY MASS INDEX: 30.73 KG/M2

## 2021-09-20 DIAGNOSIS — E11.42 TYPE 2 DIABETES MELLITUS WITH DIABETIC POLYNEUROPATHY, WITH LONG-TERM CURRENT USE OF INSULIN (HCC): ICD-10-CM

## 2021-09-20 DIAGNOSIS — I10 ESSENTIAL HYPERTENSION: ICD-10-CM

## 2021-09-20 DIAGNOSIS — K57.30 DIVERTICULOSIS OF LARGE INTESTINE WITHOUT HEMORRHAGE: ICD-10-CM

## 2021-09-20 DIAGNOSIS — E55.9 VITAMIN D DEFICIENCY: ICD-10-CM

## 2021-09-20 DIAGNOSIS — Z79.4 TYPE 2 DIABETES MELLITUS WITH DIABETIC POLYNEUROPATHY, WITH LONG-TERM CURRENT USE OF INSULIN (HCC): ICD-10-CM

## 2021-09-20 DIAGNOSIS — H43.393 FLOATERS, BILATERAL: ICD-10-CM

## 2021-09-20 DIAGNOSIS — M54.50 CHRONIC BILATERAL LOW BACK PAIN WITHOUT SCIATICA: ICD-10-CM

## 2021-09-20 DIAGNOSIS — H40.003 SUSPECTED GLAUCOMA OF BOTH EYES: ICD-10-CM

## 2021-09-20 DIAGNOSIS — Z12.11 COLON CANCER SCREENING: ICD-10-CM

## 2021-09-20 DIAGNOSIS — Z91.19 NONCOMPLIANCE WITH DIABETES TREATMENT: ICD-10-CM

## 2021-09-20 DIAGNOSIS — M15.9 PRIMARY OSTEOARTHRITIS INVOLVING MULTIPLE JOINTS: ICD-10-CM

## 2021-09-20 DIAGNOSIS — Z12.31 ENCOUNTER FOR SCREENING MAMMOGRAM FOR MALIGNANT NEOPLASM OF BREAST: ICD-10-CM

## 2021-09-20 DIAGNOSIS — N39.490 OVERFLOW INCONTINENCE: ICD-10-CM

## 2021-09-20 DIAGNOSIS — H25.13 AGE-RELATED NUCLEAR CATARACT OF BOTH EYES: ICD-10-CM

## 2021-09-20 DIAGNOSIS — I63.9 CEREBELLAR INFARCT (HCC): ICD-10-CM

## 2021-09-20 DIAGNOSIS — Z00.00 MEDICARE ANNUAL WELLNESS VISIT, SUBSEQUENT: Primary | ICD-10-CM

## 2021-09-20 DIAGNOSIS — M85.88 OSTEOPENIA OF LUMBAR SPINE: ICD-10-CM

## 2021-09-20 DIAGNOSIS — E78.00 PURE HYPERCHOLESTEROLEMIA: ICD-10-CM

## 2021-09-20 DIAGNOSIS — G89.29 CHRONIC BILATERAL LOW BACK PAIN WITHOUT SCIATICA: ICD-10-CM

## 2021-09-20 DIAGNOSIS — M43.17 SPONDYLOLISTHESIS AT L5-S1 LEVEL: ICD-10-CM

## 2021-09-20 DIAGNOSIS — I48.21 PERMANENT ATRIAL FIBRILLATION (HCC): ICD-10-CM

## 2021-09-20 PROBLEM — M25.462 EFFUSION OF LEFT KNEE JOINT: Status: RESOLVED | Noted: 2019-09-16 | Resolved: 2021-09-20

## 2021-09-20 PROBLEM — M25.511 ACUTE PAIN OF BOTH SHOULDERS: Status: RESOLVED | Noted: 2020-06-25 | Resolved: 2021-09-20

## 2021-09-20 PROBLEM — R11.2 NAUSEA AND VOMITING, INTRACTABILITY OF VOMITING NOT SPECIFIED, UNSPECIFIED VOMITING TYPE: Status: RESOLVED | Noted: 2021-06-19 | Resolved: 2021-09-20

## 2021-09-20 PROBLEM — Z79.01 ANTICOAGULATED: Status: RESOLVED | Noted: 2021-07-22 | Resolved: 2021-09-20

## 2021-09-20 PROBLEM — M23.42 BODIES, LOOSE, JOINT, KNEE, LEFT: Status: RESOLVED | Noted: 2019-09-16 | Resolved: 2021-09-20

## 2021-09-20 PROBLEM — R42 VERTIGO: Status: RESOLVED | Noted: 2021-06-19 | Resolved: 2021-09-20

## 2021-09-20 PROBLEM — E87.1 HYPONATREMIA: Status: RESOLVED | Noted: 2021-07-22 | Resolved: 2021-09-20

## 2021-09-20 PROBLEM — M47.817 DJD (DEGENERATIVE JOINT DISEASE), LUMBOSACRAL: Status: RESOLVED | Noted: 2019-01-10 | Resolved: 2021-09-20

## 2021-09-20 PROBLEM — M22.2X9 PATELLOFEMORAL PAIN SYNDROME, UNSPECIFIED LATERALITY: Status: RESOLVED | Noted: 2018-11-29 | Resolved: 2021-09-20

## 2021-09-20 PROBLEM — M25.512 ACUTE PAIN OF BOTH SHOULDERS: Status: RESOLVED | Noted: 2020-06-25 | Resolved: 2021-09-20

## 2021-09-20 PROBLEM — M47.816 FACET ARTHROPATHY, LUMBAR: Status: RESOLVED | Noted: 2019-03-27 | Resolved: 2021-09-20

## 2021-09-20 PROBLEM — H43.399 FLOATERS: Status: RESOLVED | Noted: 2017-07-26 | Resolved: 2021-09-20

## 2021-09-20 PROBLEM — M25.562 CHRONIC PAIN OF LEFT KNEE: Status: RESOLVED | Noted: 2019-03-07 | Resolved: 2021-09-20

## 2021-09-20 PROBLEM — M65.4 DE QUERVAIN'S TENOSYNOVITIS, LEFT: Status: RESOLVED | Noted: 2017-07-18 | Resolved: 2021-09-20

## 2021-09-20 PROCEDURE — G0439 PPPS, SUBSEQ VISIT: HCPCS | Performed by: FAMILY MEDICINE

## 2021-09-20 PROCEDURE — G0008 ADMIN INFLUENZA VIRUS VAC: HCPCS | Performed by: FAMILY MEDICINE

## 2021-09-20 PROCEDURE — 90662 IIV NO PRSV INCREASED AG IM: CPT | Performed by: FAMILY MEDICINE

## 2021-09-20 NOTE — PROGRESS NOTES
HPI:   Erick Romero is a 77year old female who presents for a Medicare Subsequent Annual Wellness visit (Pt already had Initial Annual Wellness).     Erick Romero is a 77year old female present today for a routine periodic Medicare health gyne functional status. Depression Screening (PHQ-2/PHQ-9): Over the LAST 2 WEEKS          Advanced Directive:  She does NOT have a Living Will on file in [address] [place] [address] Rd.    Advance care planning including the explanation and discussion of advance directives sta pain syndrome, unspecified laterality     Bilateral primary osteoarthritis of knee     Lumbar stenosis with neurogenic claudication     Chronic bilateral low back pain without sciatica     Spondylolisthesis at L5-S1 level     DJD (degenerative joint diseas Tab, Take 1 tablet (20 mg total) by mouth daily with food. rosuvastatin (CRESTOR) 40 MG Oral Tab, Take 1 tablet (40 mg total) by mouth nightly.   Insulin Degludec (TRESIBA FLEXTOUCH) 200 UNIT/ML Subcutaneous Solution Pen-injector, Inject 14 Units into the smoking about 31 years ago. Her smoking use included cigarettes. She has never used smokeless tobacco. She reports that she does not drink alcohol and does not use drugs.      REVIEW OF SYSTEMS:   Review of Systems   Constitutional: Negative for appetite ch S2 normal. No murmur heard. Pulmonary:      Effort: Pulmonary effort is normal.      Breath sounds: Normal breath sounds. Chest:   Breasts:      Right: No mass, nipple discharge or tenderness. Left: No mass, nipple discharge or tenderness. subsequent  Assessment and Plan:     [address] checkup as follows:    LABORATORY & ORDERS: Orders Placed This Encounter      CBC With Differential With Platelet      Comp Metabolic Panel (14)      Hemoglobin A1C      Lipid Panel      TSH W diabetic polyneuropathy, with long-term current use of insulin (HCC)  Doing well  CPM  Follow up KPA  Lab :   - HEMOGLOBIN A1C; Future  - TSH W REFLEX TO FREE T4; Future  - URINALYSIS, ROUTINE; Future  - URINE MICROSCOPIC W REFLEX CULTURE    3.  Noncomplian Supplementary Documentation:   Nanci Barros's SCREENING SCHEDULE   Tests on this list are recommended by your physician but may not be covered, or covered at this frequency, by your insurer.    Please check with your insurance carrier before sched BONE DENSITOMETRY (CPT=77080) 08/31/2017      No recommendations at this time   Pap and Pelvic    Pap   Covered every 2 years for women at normal risk;  Annually if at high risk -  Pap Smear,1 Year due on 07/13/2006    Chlamydia Annually if high risk -  No

## 2021-09-21 ENCOUNTER — LAB ENCOUNTER (OUTPATIENT)
Dept: LAB | Facility: HOSPITAL | Age: 76
End: 2021-09-21
Attending: FAMILY MEDICINE
Payer: MEDICARE

## 2021-09-21 DIAGNOSIS — Z79.4 TYPE 2 DIABETES MELLITUS WITH DIABETIC POLYNEUROPATHY, WITH LONG-TERM CURRENT USE OF INSULIN (HCC): ICD-10-CM

## 2021-09-21 DIAGNOSIS — E78.00 PURE HYPERCHOLESTEROLEMIA: ICD-10-CM

## 2021-09-21 DIAGNOSIS — I10 ESSENTIAL HYPERTENSION: ICD-10-CM

## 2021-09-21 DIAGNOSIS — E11.42 TYPE 2 DIABETES MELLITUS WITH DIABETIC POLYNEUROPATHY, WITH LONG-TERM CURRENT USE OF INSULIN (HCC): ICD-10-CM

## 2021-09-21 LAB
ALBUMIN SERPL-MCNC: 3.4 G/DL (ref 3.4–5)
ALBUMIN/GLOB SERPL: 0.8 {RATIO} (ref 1–2)
ALP LIVER SERPL-CCNC: 88 U/L
ALT SERPL-CCNC: 16 U/L
ANION GAP SERPL CALC-SCNC: 4 MMOL/L (ref 0–18)
AST SERPL-CCNC: 10 U/L (ref 15–37)
BASOPHILS # BLD AUTO: 0.03 X10(3) UL (ref 0–0.2)
BASOPHILS NFR BLD AUTO: 0.5 %
BILIRUB SERPL-MCNC: 0.3 MG/DL (ref 0.1–2)
BILIRUB UR QL: NEGATIVE
BUN BLD-MCNC: 19 MG/DL (ref 7–18)
BUN/CREAT SERPL: 33.3 (ref 10–20)
CALCIUM BLD-MCNC: 9.5 MG/DL (ref 8.5–10.1)
CHLORIDE SERPL-SCNC: 108 MMOL/L (ref 98–112)
CHOLEST SERPL-MCNC: 122 MG/DL (ref ?–200)
CO2 SERPL-SCNC: 29 MMOL/L (ref 21–32)
COLOR UR: YELLOW
CREAT BLD-MCNC: 0.57 MG/DL
DEPRECATED RDW RBC AUTO: 47.2 FL (ref 35.1–46.3)
EOSINOPHIL # BLD AUTO: 0.05 X10(3) UL (ref 0–0.7)
EOSINOPHIL NFR BLD AUTO: 0.9 %
ERYTHROCYTE [DISTWIDTH] IN BLOOD BY AUTOMATED COUNT: 13.8 % (ref 11–15)
EST. AVERAGE GLUCOSE BLD GHB EST-MCNC: 160 MG/DL (ref 68–126)
GLOBULIN PLAS-MCNC: 4.4 G/DL (ref 2.8–4.4)
GLUCOSE BLD-MCNC: 106 MG/DL (ref 70–99)
GLUCOSE UR-MCNC: NEGATIVE MG/DL
HBA1C MFR BLD HPLC: 7.2 % (ref ?–5.7)
HCT VFR BLD AUTO: 35.3 %
HDLC SERPL-MCNC: 54 MG/DL (ref 40–59)
HGB BLD-MCNC: 11.6 G/DL
HGB UR QL STRIP.AUTO: NEGATIVE
IMM GRANULOCYTES # BLD AUTO: 0.02 X10(3) UL (ref 0–1)
IMM GRANULOCYTES NFR BLD: 0.4 %
KETONES UR-MCNC: NEGATIVE MG/DL
LDLC SERPL CALC-MCNC: 53 MG/DL (ref ?–100)
LEUKOCYTE ESTERASE UR QL STRIP.AUTO: NEGATIVE
LYMPHOCYTES # BLD AUTO: 1.78 X10(3) UL (ref 1–4)
LYMPHOCYTES NFR BLD AUTO: 31.8 %
MCH RBC QN AUTO: 30.4 PG (ref 26–34)
MCHC RBC AUTO-ENTMCNC: 32.9 G/DL (ref 31–37)
MCV RBC AUTO: 92.7 FL
MONOCYTES # BLD AUTO: 0.55 X10(3) UL (ref 0.1–1)
MONOCYTES NFR BLD AUTO: 9.8 %
NEUTROPHILS # BLD AUTO: 3.17 X10 (3) UL (ref 1.5–7.7)
NEUTROPHILS # BLD AUTO: 3.17 X10(3) UL (ref 1.5–7.7)
NEUTROPHILS NFR BLD AUTO: 56.6 %
NITRITE UR QL STRIP.AUTO: NEGATIVE
NONHDLC SERPL-MCNC: 68 MG/DL (ref ?–130)
OSMOLALITY SERPL CALC.SUM OF ELEC: 295 MOSM/KG (ref 275–295)
PATIENT FASTING Y/N/NP: YES
PATIENT FASTING Y/N/NP: YES
PH UR: 7 [PH] (ref 5–8)
PLATELET # BLD AUTO: 256 10(3)UL (ref 150–450)
POTASSIUM SERPL-SCNC: 3.6 MMOL/L (ref 3.5–5.1)
PROT SERPL-MCNC: 7.8 G/DL (ref 6.4–8.2)
PROT UR-MCNC: NEGATIVE MG/DL
RBC # BLD AUTO: 3.81 X10(6)UL
SODIUM SERPL-SCNC: 141 MMOL/L (ref 136–145)
SP GR UR STRIP: 1.01 (ref 1–1.03)
TRIGL SERPL-MCNC: 77 MG/DL (ref 30–149)
TSI SER-ACNC: 0.49 MIU/ML (ref 0.36–3.74)
UROBILINOGEN UR STRIP-ACNC: <2
VIT D+METAB SERPL-MCNC: 26.2 NG/ML (ref 30–100)
VLDLC SERPL CALC-MCNC: 11 MG/DL (ref 0–30)
WBC # BLD AUTO: 5.6 X10(3) UL (ref 4–11)

## 2021-09-21 PROCEDURE — 84443 ASSAY THYROID STIM HORMONE: CPT

## 2021-09-21 PROCEDURE — 36415 COLL VENOUS BLD VENIPUNCTURE: CPT

## 2021-09-21 PROCEDURE — 80061 LIPID PANEL: CPT

## 2021-09-21 PROCEDURE — 81001 URINALYSIS AUTO W/SCOPE: CPT | Performed by: FAMILY MEDICINE

## 2021-09-21 PROCEDURE — 80053 COMPREHEN METABOLIC PANEL: CPT

## 2021-09-21 PROCEDURE — 87086 URINE CULTURE/COLONY COUNT: CPT | Performed by: FAMILY MEDICINE

## 2021-09-21 PROCEDURE — 81015 MICROSCOPIC EXAM OF URINE: CPT | Performed by: FAMILY MEDICINE

## 2021-09-21 PROCEDURE — 85025 COMPLETE CBC W/AUTO DIFF WBC: CPT

## 2021-09-21 PROCEDURE — 83036 HEMOGLOBIN GLYCOSYLATED A1C: CPT

## 2021-09-21 PROCEDURE — 82306 VITAMIN D 25 HYDROXY: CPT | Performed by: FAMILY MEDICINE

## 2021-09-21 RX ORDER — ERGOCALCIFEROL 1.25 MG/1
50000 CAPSULE ORAL WEEKLY
Qty: 12 CAPSULE | Refills: 4 | Status: SHIPPED | OUTPATIENT
Start: 2021-09-21 | End: 2021-10-21

## 2021-10-14 ENCOUNTER — HOSPITAL ENCOUNTER (OUTPATIENT)
Dept: ULTRASOUND IMAGING | Facility: HOSPITAL | Age: 76
Discharge: HOME | End: 2021-10-14
Attending: UROLOGY
Payer: MEDICARE

## 2021-10-14 DIAGNOSIS — N39.0 RECURRENT UTI: ICD-10-CM

## 2021-10-14 PROCEDURE — 76775 US EXAM ABDO BACK WALL LIM: CPT | Performed by: UROLOGY

## 2021-10-15 ENCOUNTER — TELEPHONE (OUTPATIENT)
Dept: SURGERY | Facility: CLINIC | Age: 76
End: 2021-10-15

## 2021-10-15 NOTE — TELEPHONE ENCOUNTER
----- Message from Colan Homans, MD sent at 10/14/2021 11:21 PM CDT -----  Please send patient the following letter --  Dear Sonali German,           Your 10/14/2021 kidney ultrasound shows left kidney to have a small 8 mm simple cyst. Simple cysts of the kidn

## 2021-10-15 NOTE — TELEPHONE ENCOUNTER
Spoke with Trip Sebastian, pt's daughter, let her know US results and PVK response. Daughter states pt is feeling better, and think the preventative medication is working. No further questions or concerns.

## 2021-11-05 ENCOUNTER — OFFICE VISIT (OUTPATIENT)
Dept: NEUROLOGY | Facility: CLINIC | Age: 76
End: 2021-11-05
Payer: MEDICARE

## 2021-11-05 VITALS
HEIGHT: 64 IN | HEART RATE: 83 BPM | BODY MASS INDEX: 30.73 KG/M2 | DIASTOLIC BLOOD PRESSURE: 82 MMHG | WEIGHT: 180 LBS | SYSTOLIC BLOOD PRESSURE: 116 MMHG

## 2021-11-05 DIAGNOSIS — I63.542 ACUTE STROKE DUE TO OCCLUSION OF LEFT CEREBELLAR ARTERY (HCC): Primary | ICD-10-CM

## 2021-11-05 DIAGNOSIS — G47.33 OSA (OBSTRUCTIVE SLEEP APNEA): ICD-10-CM

## 2021-11-05 PROCEDURE — 99214 OFFICE O/P EST MOD 30 MIN: CPT | Performed by: OTHER

## 2021-11-05 NOTE — PATIENT INSTRUCTIONS
1. Please get a home sleep study and see sleep medicine. Treating sleep apnea will improve your mood, energy level, lower your blood pressure, decrease your risk of developing dementia, and may reduce your risk of stroke.     2. Please do your home exercise

## 2021-11-05 NOTE — PROGRESS NOTES
[facility]  [address]  [phone]        Neurology Clinic Follow Up Note    Chief Complaint:  Stroke (Pt present with daughter.  Pt daughter state she still have issues with balance and fearful HEP.  HA have resolved. Mood is sad/anxious; sad over loss of freedom/independence. Anxious about falling/having another stroke. She is cooking now. She feels tried now after daily activities ie shower. Says she has no energy  Sleeping poorly.  Sleeps Rfl:   •  Riboflavin 400 MG Oral Cap, Take 400 mg by mouth daily.  For migraine prophylaxis, Disp: 90 capsule, Rfl: 3  •  OneTouch UltraSoft Lancets Does not apply Misc, Test glucose 3 times daily, Disp: 300 each, Rfl: 3  •  Insulin Pen Needle (BD PEN NEEDL fluent, and prosodic. Comprehension  intact. No aphasia or obvious neglect. Some R/L confusion.   - Cranial Nerves: No gaze preference.  Visual fields: Full but patient had motor impersistence when testing pupils are 4 mm briskly constricting to 3 mm and Ataxia: 1   8. Sensory: 0   9. Best Language:  0   10. Dysarthria: 0   11.  Extinction and Inattention: 0    Total:   1       MRS: 3    Most recent lab results:   Reviewed and assessed  No results found for this or any previous visit (from the past 36 hour diet that is rich in fruits and vegetables and thereby high in potassium is beneficial. Recommend reduced intake of sodium and increased intake of potassium, increase consumption of fruits, vegetables, and low-fat dairy products and  decreased consumption

## 2021-11-18 ENCOUNTER — OFFICE VISIT (OUTPATIENT)
Dept: SLEEP CENTER | Age: 76
End: 2021-11-18
Attending: Other
Payer: MEDICARE

## 2021-11-18 DIAGNOSIS — G47.33 OSA (OBSTRUCTIVE SLEEP APNEA): ICD-10-CM

## 2021-11-18 DIAGNOSIS — I63.542 ACUTE STROKE DUE TO OCCLUSION OF LEFT CEREBELLAR ARTERY (HCC): ICD-10-CM

## 2021-11-18 PROCEDURE — 95806 SLEEP STUDY UNATT&RESP EFFT: CPT

## 2021-11-21 NOTE — PROCEDURES
[facility]  Accredited by the [place] of [employer] (AASM)    PATIENT'S NAME: Car Rhoades   ATTENDING PHYSICIAN: Cherie Greenfield DO   REFERRING PHYSICIAN: Cherie Greenfield DO   PATIENT ACCOUNT #: [de-identified] LOCATION: Sleep C moderately severe obstructive sleep apnea (ICD-10 code G47.33). RECOMMENDATIONS:    1. CPAP titration. 2.   Weight loss. 3.   Avoid alcohol. 4.   Avoid sedating drug. 5.   Patient should not drive if at all sleepy.       Please do not hesitate to c

## 2021-12-03 DIAGNOSIS — G47.33 OSA (OBSTRUCTIVE SLEEP APNEA): Primary | ICD-10-CM

## 2021-12-10 NOTE — TELEPHONE ENCOUNTER
Pt Daughter Diane Render calling stating that pt has been out for 4 days. Needs refill asap. Please advise.

## 2021-12-11 RX ORDER — PEN NEEDLE, DIABETIC 29 G X1/2"
NEEDLE, DISPOSABLE MISCELLANEOUS
Qty: 100 EACH | Refills: 0 | Status: SHIPPED | OUTPATIENT
Start: 2021-12-11

## 2021-12-12 NOTE — TELEPHONE ENCOUNTER
Disp Refills Start End    BD PEN NEEDLE ORIGINAL U/F 29G X 12.7MM Does not apply Misc 100 each 0 12/11/2021     Sig: USE AS DIRECTED DAILY    Sent to pharmacy as: BD Pen Needle Original U/F 29G X 12.7MM (Insulin Pen Needle)    E-Prescribing Status: Receip

## 2021-12-20 ENCOUNTER — OFFICE VISIT (OUTPATIENT)
Dept: FAMILY MEDICINE CLINIC | Facility: CLINIC | Age: 76
End: 2021-12-20
Payer: MEDICARE

## 2021-12-20 VITALS
HEIGHT: 64 IN | SYSTOLIC BLOOD PRESSURE: 135 MMHG | WEIGHT: 185 LBS | DIASTOLIC BLOOD PRESSURE: 86 MMHG | HEART RATE: 96 BPM | BODY MASS INDEX: 31.58 KG/M2

## 2021-12-20 DIAGNOSIS — I10 ESSENTIAL HYPERTENSION: ICD-10-CM

## 2021-12-20 DIAGNOSIS — I63.9 CEREBELLAR INFARCT (HCC): ICD-10-CM

## 2021-12-20 DIAGNOSIS — E11.42 TYPE 2 DIABETES MELLITUS WITH DIABETIC POLYNEUROPATHY, WITH LONG-TERM CURRENT USE OF INSULIN (HCC): Primary | ICD-10-CM

## 2021-12-20 DIAGNOSIS — Z79.4 TYPE 2 DIABETES MELLITUS WITH DIABETIC POLYNEUROPATHY, WITH LONG-TERM CURRENT USE OF INSULIN (HCC): Primary | ICD-10-CM

## 2021-12-20 PROCEDURE — 99213 OFFICE O/P EST LOW 20 MIN: CPT | Performed by: FAMILY MEDICINE

## 2021-12-20 RX ORDER — ERGOCALCIFEROL 1.25 MG/1
50000 CAPSULE ORAL WEEKLY
COMMUNITY
Start: 2021-12-10

## 2021-12-20 NOTE — PROGRESS NOTES
12/20/2021  11:40 AM    Vedia Romberg is a 77year old female. Chief complaint(s): Patient presents with:  Diabetes: f/u  Atrial Fibrillation  Cerebral Palsy    HPI:     Vedia Romberg primary complaint is regarding multiple complaints.      Latisha Cowan extremity with a burning sensation type of pain.       HISTORY:  Past Medical History:   Diagnosis Date   • Cataract 2009   • Cholelithiasis    • Cold with flu    • Cup to disc asymmetry 2009   • Diabetes mellitus ([facility] [address] 2009, 2011    Diabetes no retinopathy Glaucoma Neg    • Macular degeneration Neg       Social History: Social History    Tobacco Use      Smoking status: Former Smoker        Types: Cigarettes        Quit date:         Years since quittin.9      Smokeless tobacco: Never Used    Vaping rosuvastatin (CRESTOR) 40 MG Oral Tab Take 1 tablet (40 mg total) by mouth nightly.  90 tablet 3   • Insulin Degludec (TRESIBA FLEXTOUCH) 200 UNIT/ML Subcutaneous Solution Pen-injector Inject 14 Units into the skin daily with breakfast.     • Riboflavin 400 appearance. HENT:      Head: Normocephalic. Eyes:      Conjunctiva/sclera: Conjunctivae normal.   Neck:      Vascular: No carotid bruit. Cardiovascular:      Rate and Rhythm: Normal rate. Rhythm irregular.    Pulmonary:      Effort: Pulmonary effort i 1  •  rosuvastatin (CRESTOR) 40 MG Oral Tab, Take 1 tablet (40 mg total) by mouth nightly., Disp: 90 tablet, Rfl: 3  •  Insulin Degludec (TRESIBA FLEXTOUCH) 200 UNIT/ML Subcutaneous Solution Pen-injector, Inject 14 Units into the skin daily with breakfast. patient was counseled concerning the relationship between diabetes control and retinopathy, nephropathy, and neuropathy. Advised as to the targets of pre-meal glucoses ( mg/dl) and post meal glucoses (<140-160 mg/dl) Home glucose testing discussed.  Robb Marshall

## 2021-12-29 ENCOUNTER — ORDER TRANSCRIPTION (OUTPATIENT)
Dept: SLEEP CENTER | Age: 76
End: 2021-12-29

## 2021-12-29 DIAGNOSIS — G47.33 OBSTRUCTIVE SLEEP APNEA (ADULT) (PEDIATRIC): Primary | ICD-10-CM

## 2022-01-05 NOTE — TELEPHONE ENCOUNTER
Patients daughter called requesting a refill for patients insulin she states patient is running low.        Insulin Degludec (TRESIBA FLEXTOUCH) 200 UNIT/ML Subcutaneous Solution Pen-injector Inject 14 Units into the skin daily with breakfast.

## 2022-01-06 RX ORDER — INSULIN DEGLUDEC 200 U/ML
14 INJECTION, SOLUTION SUBCUTANEOUS
Qty: 3 EACH | Refills: 1 | Status: SHIPPED | OUTPATIENT
Start: 2022-01-06

## 2022-01-07 NOTE — TELEPHONE ENCOUNTER
Patients daughter is requesting refill on the following medication. She would also like to request multiple refills.      Rivaroxaban (XARELTO) 20 MG Oral Tab

## 2022-01-08 NOTE — TELEPHONE ENCOUNTER
Please review. Protocol failed or does not have a protocol. Requested Prescriptions   Pending Prescriptions Disp Refills    rivaroxaban (XARELTO) 20 MG Oral Tab 90 tablet 1     Sig: Take 1 tablet (20 mg total) by mouth daily with food.         There is

## 2022-01-21 ENCOUNTER — TELEPHONE (OUTPATIENT)
Dept: FAMILY MEDICINE CLINIC | Facility: CLINIC | Age: 77
End: 2022-01-21

## 2022-01-21 NOTE — TELEPHONE ENCOUNTER
[facility] from [facility] called to inform patient is requesting Nurse Home health visits. If Dr. Jo Lopez agrees with Home health services needed please send order with recent encounter notes.     Carrie# [phone]  Fax# [phone]

## 2022-01-26 ENCOUNTER — OFFICE VISIT (OUTPATIENT)
Dept: SURGERY | Facility: CLINIC | Age: 77
End: 2022-01-26
Payer: MEDICARE

## 2022-01-26 DIAGNOSIS — N39.0 RECURRENT URINARY TRACT INFECTION: Primary | ICD-10-CM

## 2022-01-26 DIAGNOSIS — Z79.01 ON RIVAROXABAN THERAPY: ICD-10-CM

## 2022-01-26 DIAGNOSIS — N39.46 MIXED INCONTINENCE URGE AND STRESS: ICD-10-CM

## 2022-01-26 DIAGNOSIS — R35.1 NOCTURIA: ICD-10-CM

## 2022-01-26 PROCEDURE — 99214 OFFICE O/P EST MOD 30 MIN: CPT | Performed by: UROLOGY

## 2022-01-26 NOTE — PATIENT INSTRUCTIONS
Sidra Costa M.D.    1.  Continue methenamine 1 g twice daily as you are doing. This medication helps prevent urinary tract infections. It is not an antibiotic.     2.  Continue standing order for urine culture and

## 2022-01-26 NOTE — TELEPHONE ENCOUNTER
Refill passed per [facility] protocol.    Requested Prescriptions   Pending Prescriptions Disp Refills    metFORMIN 850 MG Oral Tab 180 tablet 1     Sig: TAKE ONE TABLET BY MOUTH BID        Diabetes Medication Protocol Passed - 1/26/2022  3:59 PM

## 2022-01-26 NOTE — TELEPHONE ENCOUNTER
Patient requesting a refill for the following medication, states she only has pills for today and tomorrow.     metFORMIN 850 MG Oral Tab 180 tablet 0 9/10/2021    Sig:   TAKE ONE TABLET BY MOUTH BID     Route:   (none)     Note to Pharmacy:   Pt due for la

## 2022-01-26 NOTE — PROGRESS NOTES
HPI:    Patient ID: Carly Ivy is a 77year old female.     HPI        History provided by the patient and daughter, Ximena Floyd .      Febrile UTI  Review of records show 5+ E. coli urine cultures since January 2020.  During 07/21/2021-07/26/2021 hos hospital on generic Bactrim DS twice daily for 10 to 14 days; in need of office cystoscopy, no biopsy (since patient on rivaroxaban) to rule out any anatomical abnormalities of the bladder     08/23/2021 Cystoscopy for recurrent UTI; 3-4+ trabeculated;   n Oral Tab TAKE ONE TABLET BY MOUTH  tablet 1   • BD PEN NEEDLE ORIGINAL U/F 29G X 12.7MM Does not apply Misc USE AS DIRECTED DAILY 100 each 0   • Glucose Blood (ONETOUCH ULTRA) In Vitro Strip Test glucose 3 times daily 300 each 3   • Meclizine HCl 25 CATARACT EXTRACTION W/  INTRAOCULAR LENS IMPLANT Right 12/19/2019    Dr. Atiya Bragg @ [facility]   • CHOLECYSTECTOMY     • COLONOSCOPY N/A 1/12/2017    Procedure: COLONOSCOPY;  Surgeon: Av Ludwig MD;  Location: [address] [place] [address] ENDOSCOPY   • ELECTROCARDIOGRAM, COMPLETE  4 = negative; WBC = 11-20; RBC = 0-2; Creatinine = 0.57; GFR = 91  09/01/2021 Urine culture = <10,000 cfu/ml Multiple species present- probable contamination; UA blood = negative; protein = 30; WBC = 6-10; RBC = 0-2  08/23/2021 Urine culture = 10,000 - 50,00 = 1,424; RBC = 34; bacteria = few         IMAGING  10/14/2021  US KIDNEYS  = Simple 8 mm left renal cortical cyst; otherwise unremarkable    07/23/2021 CT ABDOMEN + PELVIS KIDNEYSTONE 2D RNDR = suggest cystitis with right ascending urinary tract infection/ follow up with a Urology colleague. Patient will follow up in a year with [facility] with a UA and urine culture prior to visit. If severe recurrance of UTI returns or any    (R35.1) Nocturia  Patient has nocturia 3x.  She avoids liquids 3 hour encounter       Imaging & Referrals:  None     ID#[id number]  By signing my name below, Dana Rae,  attest that this documentation has been prepared under the direction and in the presence of Nella Baxter MD.   Electronically Signed: Jose Mcpherson, 1

## 2022-03-14 ENCOUNTER — NURSE TRIAGE (OUTPATIENT)
Dept: FAMILY MEDICINE CLINIC | Facility: CLINIC | Age: 77
End: 2022-03-14

## 2022-04-08 ENCOUNTER — TELEPHONE (OUTPATIENT)
Dept: NEUROLOGY | Facility: CLINIC | Age: 77
End: 2022-04-08

## 2022-04-14 RX ORDER — PEN NEEDLE, DIABETIC 29 G X1/2"
NEEDLE, DISPOSABLE MISCELLANEOUS
Qty: 100 EACH | Refills: 0 | OUTPATIENT
Start: 2022-04-14

## 2022-04-14 RX ORDER — PEN NEEDLE, DIABETIC 29 G X1/2"
NEEDLE, DISPOSABLE MISCELLANEOUS
Qty: 100 EACH | Refills: 5 | Status: SHIPPED | OUTPATIENT
Start: 2022-04-14

## 2022-04-15 NOTE — TELEPHONE ENCOUNTER
Refill passed per [facility] protocol   Requested Prescriptions   Pending Prescriptions Disp Refills    BD PEN NEEDLE ORIGINAL U/F 29G X 12.7MM Does not apply Misc [Pharmacy Med Name: B-D PEN NDL [id number](6/0) RED] 100 each 0     Sig: USE ONCE DAILY TO INJECT INSULIN        Diabetic Supplies Protocol Passed - 4/14/2022  5:14 PM        Passed - Appointment in past 12 or next 3 months

## 2022-04-26 ENCOUNTER — OFFICE VISIT (OUTPATIENT)
Dept: FAMILY MEDICINE CLINIC | Facility: CLINIC | Age: 77
End: 2022-04-26
Payer: MEDICARE

## 2022-04-26 VITALS
DIASTOLIC BLOOD PRESSURE: 70 MMHG | HEART RATE: 76 BPM | WEIGHT: 181.81 LBS | SYSTOLIC BLOOD PRESSURE: 120 MMHG | HEIGHT: 64 IN | BODY MASS INDEX: 31.04 KG/M2

## 2022-04-26 DIAGNOSIS — E11.65 UNCONTROLLED TYPE 2 DIABETES MELLITUS WITH HYPERGLYCEMIA (HCC): Primary | ICD-10-CM

## 2022-04-26 DIAGNOSIS — E11.42 TYPE 2 DIABETES MELLITUS WITH DIABETIC POLYNEUROPATHY, WITH LONG-TERM CURRENT USE OF INSULIN (HCC): ICD-10-CM

## 2022-04-26 DIAGNOSIS — Z79.4 TYPE 2 DIABETES MELLITUS WITH DIABETIC POLYNEUROPATHY, WITH LONG-TERM CURRENT USE OF INSULIN (HCC): ICD-10-CM

## 2022-04-26 DIAGNOSIS — I63.9 CEREBELLAR INFARCT (HCC): ICD-10-CM

## 2022-04-26 DIAGNOSIS — I48.21 PERMANENT ATRIAL FIBRILLATION (HCC): ICD-10-CM

## 2022-04-26 DIAGNOSIS — I10 ESSENTIAL HYPERTENSION: ICD-10-CM

## 2022-04-26 LAB
CARTRIDGE LOT#: ABNORMAL NUMERIC
HEMOGLOBIN A1C: 11.6 % (ref 4.3–5.6)

## 2022-04-26 PROCEDURE — 99214 OFFICE O/P EST MOD 30 MIN: CPT | Performed by: FAMILY MEDICINE

## 2022-04-26 PROCEDURE — 83036 HEMOGLOBIN GLYCOSYLATED A1C: CPT | Performed by: FAMILY MEDICINE

## 2022-04-26 RX ORDER — METOPROLOL TARTRATE 75 MG/1
75 TABLET, FILM COATED ORAL
Qty: 180 TABLET | Refills: 1 | Status: SHIPPED | OUTPATIENT
Start: 2022-04-26

## 2022-04-26 RX ORDER — INSULIN DEGLUDEC 200 U/ML
14 INJECTION, SOLUTION SUBCUTANEOUS
Qty: 3 EACH | Refills: 1 | Status: SHIPPED | OUTPATIENT
Start: 2022-04-26 | End: 2022-04-26

## 2022-04-26 RX ORDER — INSULIN DEGLUDEC 200 U/ML
20 INJECTION, SOLUTION SUBCUTANEOUS
Qty: 3 EACH | Refills: 1 | Status: SHIPPED | OUTPATIENT
Start: 2022-04-26

## 2022-05-13 ENCOUNTER — OFFICE VISIT (OUTPATIENT)
Dept: NEUROLOGY | Facility: CLINIC | Age: 77
End: 2022-05-13
Payer: MEDICARE

## 2022-05-13 VITALS — DIASTOLIC BLOOD PRESSURE: 80 MMHG | HEART RATE: 86 BPM | SYSTOLIC BLOOD PRESSURE: 130 MMHG

## 2022-05-13 DIAGNOSIS — I63.542 ACUTE STROKE DUE TO OCCLUSION OF LEFT CEREBELLAR ARTERY (HCC): Primary | ICD-10-CM

## 2022-05-13 DIAGNOSIS — E11.42 DIABETIC POLYNEUROPATHY ASSOCIATED WITH TYPE 2 DIABETES MELLITUS (HCC): ICD-10-CM
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
## 2022-05-13 DIAGNOSIS — M79.601 RIGHT ARM PAIN: ICD-10-CM

## 2022-05-13 RX ORDER — DULOXETIN HYDROCHLORIDE 30 MG/1
30 CAPSULE, DELAYED RELEASE ORAL DAILY
Qty: 90 CAPSULE | Refills: 0 | Status: SHIPPED | OUTPATIENT
Start: 2022-05-13 | End: 2022-08-11

## 2022-05-14 ENCOUNTER — HOSPITAL ENCOUNTER (OUTPATIENT)
Dept: GENERAL RADIOLOGY | Facility: HOSPITAL | Age: 77
Discharge: HOME | End: 2022-05-14
Attending: Other
Payer: MEDICARE

## 2022-05-14 DIAGNOSIS — M79.601 RIGHT ARM PAIN: ICD-10-CM

## 2022-05-14 PROCEDURE — 73060 X-RAY EXAM OF HUMERUS: CPT | Performed by: OTHER

## 2022-07-14 ENCOUNTER — TELEPHONE (OUTPATIENT)
Dept: FAMILY MEDICINE CLINIC | Facility: CLINIC | Age: 77
End: 2022-07-14

## 2022-07-14 RX ORDER — DILTIAZEM HYDROCHLORIDE 120 MG/1
120 CAPSULE, COATED, EXTENDED RELEASE ORAL DAILY
Qty: 90 CAPSULE | Refills: 1 | Status: SHIPPED | OUTPATIENT
Start: 2022-07-14

## 2022-07-14 NOTE — TELEPHONE ENCOUNTER
Per daughter of patient, she needs a copy of patient medication list due to patient has an appointment with another doctor tomorrow, and she would like to pick it up this afternoon if possible. Please call her when it is ready.

## 2022-07-14 NOTE — TELEPHONE ENCOUNTER
Spoke to [facility] office and advised below. Spoke to Sagrario and advised that she can go to the office and they will print it for her. She verbalized understanding.

## 2022-07-15 RX ORDER — DILTIAZEM HYDROCHLORIDE 120 MG/1
CAPSULE, COATED, EXTENDED RELEASE ORAL
Qty: 90 CAPSULE | Refills: 2 | Status: SHIPPED | OUTPATIENT
Start: 2022-07-15

## 2022-07-20 RX ORDER — INSULIN DEGLUDEC 200 U/ML
INJECTION, SOLUTION SUBCUTANEOUS
Qty: 9 ML | Refills: 0 | Status: SHIPPED | OUTPATIENT
Start: 2022-07-20

## 2022-07-21 ENCOUNTER — TELEPHONE (OUTPATIENT)
Dept: SURGERY | Facility: CLINIC | Age: 77
End: 2022-07-21

## 2022-07-21 NOTE — TELEPHONE ENCOUNTER
Received fax TRUE from [facility] requesting urology notes, notes printed and faxed to secure fax number.

## 2022-08-19 DIAGNOSIS — E11.42 DIABETIC POLYNEUROPATHY ASSOCIATED WITH TYPE 2 DIABETES MELLITUS (HCC): ICD-10-CM

## 2022-08-19 NOTE — TELEPHONE ENCOUNTER
Refill request for duloxetine 30 mg, take 1 cap daily, #90, no refills    LOV: 5/13/22  NOV: None  Last refilled on 5/13/22

## 2022-08-22 RX ORDER — DULOXETIN HYDROCHLORIDE 30 MG/1
CAPSULE, DELAYED RELEASE ORAL
Qty: 90 CAPSULE | Refills: 0 | Status: SHIPPED | OUTPATIENT
Start: 2022-08-22

## 2022-09-16 ENCOUNTER — TELEPHONE (OUTPATIENT)
Dept: SURGERY | Facility: CLINIC | Age: 77
End: 2022-09-16

## 2022-09-16 NOTE — TELEPHONE ENCOUNTER
Received fax from [facility] requesting medical records. Faxed copy of lov notes, procedure notes, lab and imaging results. Notes faxed to [phone].

## 2022-09-21 RX ORDER — RIVAROXABAN 20 MG/1
TABLET, FILM COATED ORAL
Qty: 90 TABLET | Refills: 1 | Status: SHIPPED | OUTPATIENT
Start: 2022-09-21

## 2022-09-25 DIAGNOSIS — E11.42 DIABETIC POLYNEUROPATHY ASSOCIATED WITH TYPE 2 DIABETES MELLITUS (HCC): ICD-10-CM

## 2022-09-26 RX ORDER — DULOXETIN HYDROCHLORIDE 30 MG/1
CAPSULE, DELAYED RELEASE ORAL
Qty: 90 CAPSULE | Refills: 0 | Status: SHIPPED | OUTPATIENT
Start: 2022-09-26

## 2022-09-26 NOTE — TELEPHONE ENCOUNTER
Medication: DULOXETINE 30 MG Oral Cap , 1 po daily    LOV: 5/13/22  NOV: none    Last refill/ILPMP: 8/22/22

## 2022-11-15 ENCOUNTER — OFFICE VISIT (OUTPATIENT)
Dept: FAMILY MEDICINE CLINIC | Facility: CLINIC | Age: 77
End: 2022-11-15
Payer: MEDICARE

## 2022-11-15 VITALS
WEIGHT: 173 LBS | BODY MASS INDEX: 30.65 KG/M2 | SYSTOLIC BLOOD PRESSURE: 125 MMHG | HEART RATE: 125 BPM | HEIGHT: 63 IN | DIASTOLIC BLOOD PRESSURE: 86 MMHG

## 2022-11-15 DIAGNOSIS — I48.21 PERMANENT ATRIAL FIBRILLATION (HCC): ICD-10-CM

## 2022-11-15 DIAGNOSIS — E11.65 UNCONTROLLED TYPE 2 DIABETES MELLITUS WITH HYPERGLYCEMIA (HCC): Primary | ICD-10-CM

## 2022-11-15 DIAGNOSIS — I63.9 CEREBELLAR INFARCT (HCC): ICD-10-CM

## 2022-11-15 DIAGNOSIS — I10 ESSENTIAL HYPERTENSION: ICD-10-CM

## 2022-11-15 PROBLEM — D68.69 OTHER THROMBOPHILIA (HCC): Status: ACTIVE | Noted: 2022-11-15

## 2022-11-15 LAB
CARTRIDGE EXPIRATION DATE: ABNORMAL DATE
CARTRIDGE LOT#: ABNORMAL NUMERIC
HEMOGLOBIN A1C: 8.6 % (ref 4.3–5.6)

## 2022-11-15 PROCEDURE — 99214 OFFICE O/P EST MOD 30 MIN: CPT | Performed by: FAMILY MEDICINE

## 2022-11-15 PROCEDURE — 83036 HEMOGLOBIN GLYCOSYLATED A1C: CPT | Performed by: FAMILY MEDICINE

## 2022-11-15 RX ORDER — INSULIN DEGLUDEC 200 U/ML
18 INJECTION, SOLUTION SUBCUTANEOUS
Qty: 6 EACH | Refills: 2 | Status: SHIPPED | OUTPATIENT
Start: 2022-11-15

## 2022-11-15 RX ORDER — METHENAMINE HIPPURATE 1000 MG/1
1 TABLET ORAL 2 TIMES DAILY
Qty: 20 TABLET | Refills: 0 | Status: SHIPPED | OUTPATIENT
Start: 2022-11-15

## 2022-11-15 RX ORDER — METOPROLOL TARTRATE 75 MG/1
75 TABLET, FILM COATED ORAL
Qty: 180 TABLET | Refills: 1 | Status: SHIPPED | OUTPATIENT
Start: 2022-11-15

## 2022-11-15 RX ORDER — GABAPENTIN 300 MG/1
300 CAPSULE ORAL 3 TIMES DAILY
Qty: 270 CAPSULE | Refills: 1 | Status: SHIPPED | OUTPATIENT
Start: 2022-11-15

## 2022-11-15 RX ORDER — DICLOFENAC SODIUM 75 MG/1
75 TABLET, DELAYED RELEASE ORAL 2 TIMES DAILY PRN
COMMUNITY
Start: 2022-07-11 | End: 2022-11-15

## 2022-11-15 RX ORDER — GABAPENTIN 300 MG/1
300 CAPSULE ORAL 3 TIMES DAILY
COMMUNITY
End: 2022-11-15

## 2022-11-15 RX ORDER — DILTIAZEM HYDROCHLORIDE 120 MG/1
120 CAPSULE, COATED, EXTENDED RELEASE ORAL DAILY
Qty: 90 CAPSULE | Refills: 2 | Status: SHIPPED | OUTPATIENT
Start: 2022-11-15

## 2022-11-15 RX ORDER — ROSUVASTATIN CALCIUM 40 MG/1
40 TABLET, COATED ORAL NIGHTLY
Qty: 90 TABLET | Refills: 3 | Status: SHIPPED | OUTPATIENT
Start: 2022-11-15

## 2022-11-19 RX ORDER — ERGOCALCIFEROL 1.25 MG/1
CAPSULE ORAL
Qty: 12 CAPSULE | Refills: 0 | Status: SHIPPED | OUTPATIENT
Start: 2022-11-19

## 2022-12-22 ENCOUNTER — TELEPHONE (OUTPATIENT)
Dept: FAMILY MEDICINE CLINIC | Facility: CLINIC | Age: 77
End: 2022-12-22

## 2022-12-22 DIAGNOSIS — N30.20 BLADDER INFECTION, CHRONIC: Primary | ICD-10-CM

## 2022-12-22 NOTE — TELEPHONE ENCOUNTER
Patient is requesting referral.     Name of specialist and specialty department : [facility]  Reason for visit with the specialist: Chronic bladder infection  Address of the specialist office:[address] [facility] in [place], [place]  Appointment date: 01/18/23         CSS informed patient the turnaround time for referral is 5-7 business days. Patient was informed to check their [employer] account for referral status.

## 2022-12-23 NOTE — TELEPHONE ENCOUNTER
No, the causes of UTI may just be her uncontrol diabetes. Make sure she is taking all of her diabetes medications, following a 1800 ADA diet. Needs to scheck her sugar levels TID and follow up with me once she has 3-4 weeks of accu-check numbers. If her morning fasting glucose are > 140 then come in to the clinic (w/ appt) sooner. Bring all accu-check #s and medicaions to the visit.

## 2022-12-23 NOTE — TELEPHONE ENCOUNTER
Please sign off if you agree with plan of care.      Thank you,  [facility]  Referral specialist

## 2023-01-01 NOTE — TELEPHONE ENCOUNTER
Pt calling checking status PA for med, pt states she needs this ASAP for her arm. Pt requesting rush and call when approved. Pt states if this is not covered please advise on alternative or she will pain out of pocket. labor/delivery

## 2023-01-02 DIAGNOSIS — E11.42 DIABETIC POLYNEUROPATHY ASSOCIATED WITH TYPE 2 DIABETES MELLITUS (HCC): ICD-10-CM

## 2023-01-03 RX ORDER — DULOXETIN HYDROCHLORIDE 30 MG/1
CAPSULE, DELAYED RELEASE ORAL
Qty: 90 CAPSULE | Refills: 0 | Status: SHIPPED | OUTPATIENT
Start: 2023-01-03

## 2023-01-10 ENCOUNTER — TELEPHONE (OUTPATIENT)
Dept: FAMILY MEDICINE CLINIC | Facility: CLINIC | Age: 78
End: 2023-01-10

## 2023-01-16 ENCOUNTER — TELEPHONE (OUTPATIENT)
Dept: FAMILY MEDICINE CLINIC | Facility: CLINIC | Age: 78
End: 2023-01-16

## 2023-01-16 DIAGNOSIS — N39.0 RECURRENT UTI: Primary | ICD-10-CM

## 2023-01-16 NOTE — TELEPHONE ENCOUNTER
Patient daughter would like to speak with provider only regarding why referral was denied. Patient states she has been seen with Urologist for over a year Would like a call back today. Patient has an appointment with Dr. Purnima Estrada on 01/18/23.

## 2023-01-18 ENCOUNTER — OFFICE VISIT (OUTPATIENT)
Dept: SURGERY | Facility: CLINIC | Age: 78
End: 2023-01-18
Payer: COMMERCIAL

## 2023-01-18 DIAGNOSIS — N39.0 RECURRENT UTI: Primary | ICD-10-CM

## 2023-01-18 PROCEDURE — 99214 OFFICE O/P EST MOD 30 MIN: CPT | Performed by: UROLOGY

## 2023-01-18 RX ORDER — ESTRADIOL 0.1 MG/G
1 CREAM VAGINAL DAILY
Qty: 42.5 G | Refills: 11 | Status: SHIPPED | OUTPATIENT
Start: 2023-01-18

## 2023-01-18 RX ORDER — METHENAMINE HIPPURATE 1000 MG/1
1 TABLET ORAL 2 TIMES DAILY
Qty: 90 TABLET | Refills: 3 | Status: SHIPPED | OUTPATIENT
Start: 2023-01-18

## 2023-02-11 ENCOUNTER — TELEPHONE (OUTPATIENT)
Dept: FAMILY MEDICINE CLINIC | Facility: CLINIC | Age: 78
End: 2023-02-11

## 2023-02-11 NOTE — TELEPHONE ENCOUNTER
On call  Talked to patient granddaughter, patient has been off [place] for at least 4 days, they have been using as short acting medication that they had at home but she is only using 3 units and not with all meals, morning glucose was 350, last reading was 400. Patient is asymptomatic, they expect that they will get long-acting sometime later today. I explained to granddaughter that if they were to continue using the short acting the amount of insulin that she is prescribed per day should be divided within the 3 doses that she is having with meals so her baseline level of injection should be around 6 units as she was injecting 18 units daily. Calculating in a sliding scale model for the next dose recommended to give her 10 to 12 units based on her glucose level, track her afternoon level and if glucose >250 may get another dose of short-acting and then start long-acting at night. If they are not able to get long-acting insulin they should contact us as soon as possible for alternative options of insulin.   Granddaughter has no further questions

## 2023-02-27 ENCOUNTER — OFFICE VISIT (OUTPATIENT)
Dept: FAMILY MEDICINE CLINIC | Facility: CLINIC | Age: 78
End: 2023-02-27

## 2023-02-27 VITALS
DIASTOLIC BLOOD PRESSURE: 83 MMHG | BODY MASS INDEX: 31.12 KG/M2 | WEIGHT: 175.63 LBS | HEART RATE: 91 BPM | HEIGHT: 63 IN | SYSTOLIC BLOOD PRESSURE: 139 MMHG

## 2023-02-27 DIAGNOSIS — I63.9 CEREBELLAR INFARCT (HCC): ICD-10-CM

## 2023-02-27 DIAGNOSIS — I10 ESSENTIAL HYPERTENSION: ICD-10-CM

## 2023-02-27 DIAGNOSIS — Z79.4 TYPE 2 DIABETES MELLITUS WITH DIABETIC POLYNEUROPATHY, WITH LONG-TERM CURRENT USE OF INSULIN (HCC): Primary | ICD-10-CM

## 2023-02-27 DIAGNOSIS — E11.42 TYPE 2 DIABETES MELLITUS WITH DIABETIC POLYNEUROPATHY, WITH LONG-TERM CURRENT USE OF INSULIN (HCC): Primary | ICD-10-CM

## 2023-02-27 DIAGNOSIS — I48.21 PERMANENT ATRIAL FIBRILLATION (HCC): ICD-10-CM

## 2023-02-27 LAB
CARTRIDGE LOT#: ABNORMAL NUMERIC
HEMOGLOBIN A1C: 8.7 % (ref 4.3–5.6)

## 2023-02-27 PROCEDURE — 99214 OFFICE O/P EST MOD 30 MIN: CPT | Performed by: FAMILY MEDICINE

## 2023-02-27 PROCEDURE — 83036 HEMOGLOBIN GLYCOSYLATED A1C: CPT | Performed by: FAMILY MEDICINE

## 2023-02-27 PROCEDURE — 3075F SYST BP GE 130 - 139MM HG: CPT | Performed by: FAMILY MEDICINE

## 2023-02-27 PROCEDURE — 3079F DIAST BP 80-89 MM HG: CPT | Performed by: FAMILY MEDICINE

## 2023-02-27 PROCEDURE — 3008F BODY MASS INDEX DOCD: CPT | Performed by: FAMILY MEDICINE

## 2023-02-27 RX ORDER — EMPAGLIFLOZIN 25 MG/1
1 TABLET, FILM COATED ORAL DAILY
Qty: 90 TABLET | Refills: 2 | Status: SHIPPED | OUTPATIENT
Start: 2023-02-27

## 2023-02-27 RX ORDER — INSULIN DEGLUDEC 200 U/ML
INJECTION, SOLUTION SUBCUTANEOUS
Qty: 6 EACH | Refills: 2 | Status: SHIPPED | OUTPATIENT
Start: 2023-02-27

## 2023-03-26 DIAGNOSIS — E55.9 VITAMIN D DEFICIENCY: Primary | ICD-10-CM

## 2023-04-03 RX ORDER — METOPROLOL TARTRATE 75 MG/1
75 TABLET, FILM COATED ORAL
Qty: 180 TABLET | Refills: 1 | Status: SHIPPED | OUTPATIENT
Start: 2023-04-03

## 2023-05-25 ENCOUNTER — LAB ENCOUNTER (OUTPATIENT)
Dept: LAB | Facility: HOSPITAL | Age: 78
End: 2023-05-25
Attending: FAMILY MEDICINE
Payer: MEDICARE

## 2023-05-25 DIAGNOSIS — Z79.4 TYPE 2 DIABETES MELLITUS WITH DIABETIC POLYNEUROPATHY, WITH LONG-TERM CURRENT USE OF INSULIN (HCC): ICD-10-CM

## 2023-05-25 DIAGNOSIS — E11.42 TYPE 2 DIABETES MELLITUS WITH DIABETIC POLYNEUROPATHY, WITH LONG-TERM CURRENT USE OF INSULIN (HCC): ICD-10-CM

## 2023-05-25 DIAGNOSIS — E55.9 VITAMIN D DEFICIENCY: ICD-10-CM

## 2023-05-25 LAB
ALBUMIN SERPL-MCNC: 3.6 G/DL (ref 3.4–5)
ALBUMIN/GLOB SERPL: 0.9 {RATIO} (ref 1–2)
ALP LIVER SERPL-CCNC: 78 U/L
ALT SERPL-CCNC: 19 U/L
ANION GAP SERPL CALC-SCNC: 6 MMOL/L (ref 0–18)
AST SERPL-CCNC: 13 U/L (ref 15–37)
BILIRUB SERPL-MCNC: 0.3 MG/DL (ref 0.1–2)
BUN BLD-MCNC: 22 MG/DL (ref 7–18)
BUN/CREAT SERPL: 29.3 (ref 10–20)
CALCIUM BLD-MCNC: 10.7 MG/DL (ref 8.5–10.1)
CHLORIDE SERPL-SCNC: 104 MMOL/L (ref 98–112)
CHOLEST SERPL-MCNC: 111 MG/DL (ref ?–200)
CO2 SERPL-SCNC: 29 MMOL/L (ref 21–32)
CREAT BLD-MCNC: 0.75 MG/DL
CREAT UR-SCNC: 97.8 MG/DL
FASTING PATIENT LIPID ANSWER: YES
FASTING STATUS PATIENT QL REPORTED: YES
GFR SERPLBLD BASED ON 1.73 SQ M-ARVRAT: 82 ML/MIN/1.73M2 (ref 60–?)
GLOBULIN PLAS-MCNC: 3.9 G/DL (ref 2.8–4.4)
GLUCOSE BLD-MCNC: 122 MG/DL (ref 70–99)
HDLC SERPL-MCNC: 55 MG/DL (ref 40–59)
LDLC SERPL CALC-MCNC: 38 MG/DL (ref ?–100)
MICROALBUMIN UR-MCNC: 2.19 MG/DL
MICROALBUMIN/CREAT 24H UR-RTO: 22.4 UG/MG (ref ?–30)
NONHDLC SERPL-MCNC: 56 MG/DL (ref ?–130)
OSMOLALITY SERPL CALC.SUM OF ELEC: 293 MOSM/KG (ref 275–295)
POTASSIUM SERPL-SCNC: 3.9 MMOL/L (ref 3.5–5.1)
PROT SERPL-MCNC: 7.5 G/DL (ref 6.4–8.2)
SODIUM SERPL-SCNC: 139 MMOL/L (ref 136–145)
TRIGL SERPL-MCNC: 94 MG/DL (ref 30–149)
VIT D+METAB SERPL-MCNC: 57 NG/ML (ref 30–100)
VLDLC SERPL CALC-MCNC: 13 MG/DL (ref 0–30)

## 2023-05-25 PROCEDURE — 82306 VITAMIN D 25 HYDROXY: CPT

## 2023-05-25 PROCEDURE — 82570 ASSAY OF URINE CREATININE: CPT

## 2023-05-25 PROCEDURE — 80061 LIPID PANEL: CPT

## 2023-05-25 PROCEDURE — 82043 UR ALBUMIN QUANTITATIVE: CPT

## 2023-05-25 PROCEDURE — 80053 COMPREHEN METABOLIC PANEL: CPT

## 2023-05-25 PROCEDURE — 36415 COLL VENOUS BLD VENIPUNCTURE: CPT

## 2023-06-15 ENCOUNTER — LAB ENCOUNTER (OUTPATIENT)
Dept: LAB | Facility: HOSPITAL | Age: 78
End: 2023-06-15
Attending: INTERNAL MEDICINE
Payer: MEDICARE

## 2023-06-15 DIAGNOSIS — E78.2 MIXED HYPERLIPIDEMIA: ICD-10-CM

## 2023-06-15 DIAGNOSIS — I48.0 PAROXYSMAL ATRIAL FIBRILLATION (HCC): Primary | ICD-10-CM

## 2023-06-15 DIAGNOSIS — E11.9 DIABETES MELLITUS (HCC): ICD-10-CM

## 2023-06-15 DIAGNOSIS — I10 PRIMARY HYPERTENSION: ICD-10-CM

## 2023-06-15 LAB
BASOPHILS # BLD AUTO: 0.04 X10(3) UL (ref 0–0.2)
BASOPHILS NFR BLD AUTO: 0.7 %
DEPRECATED RDW RBC AUTO: 43.3 FL (ref 35.1–46.3)
EOSINOPHIL # BLD AUTO: 0.1 X10(3) UL (ref 0–0.7)
EOSINOPHIL NFR BLD AUTO: 1.7 %
ERYTHROCYTE [DISTWIDTH] IN BLOOD BY AUTOMATED COUNT: 12.7 % (ref 11–15)
HCT VFR BLD AUTO: 36.6 %
HGB BLD-MCNC: 11.8 G/DL
IMM GRANULOCYTES # BLD AUTO: 0.01 X10(3) UL (ref 0–1)
IMM GRANULOCYTES NFR BLD: 0.2 %
LYMPHOCYTES # BLD AUTO: 2.35 X10(3) UL (ref 1–4)
LYMPHOCYTES NFR BLD AUTO: 40.8 %
MCH RBC QN AUTO: 30 PG (ref 26–34)
MCHC RBC AUTO-ENTMCNC: 32.2 G/DL (ref 31–37)
MCV RBC AUTO: 93.1 FL
MONOCYTES # BLD AUTO: 0.46 X10(3) UL (ref 0.1–1)
MONOCYTES NFR BLD AUTO: 8 %
NEUTROPHILS # BLD AUTO: 2.8 X10 (3) UL (ref 1.5–7.7)
NEUTROPHILS # BLD AUTO: 2.8 X10(3) UL (ref 1.5–7.7)
NEUTROPHILS NFR BLD AUTO: 48.6 %
PLATELET # BLD AUTO: 217 10(3)UL (ref 150–450)
RBC # BLD AUTO: 3.93 X10(6)UL
TSI SER-ACNC: 1.59 MIU/ML (ref 0.36–3.74)
WBC # BLD AUTO: 5.8 X10(3) UL (ref 4–11)

## 2023-06-15 PROCEDURE — 36415 COLL VENOUS BLD VENIPUNCTURE: CPT

## 2023-06-15 PROCEDURE — 85025 COMPLETE CBC W/AUTO DIFF WBC: CPT

## 2023-06-15 PROCEDURE — 84443 ASSAY THYROID STIM HORMONE: CPT

## 2023-06-16 ENCOUNTER — LAB ENCOUNTER (OUTPATIENT)
Dept: LAB | Facility: HOSPITAL | Age: 78
End: 2023-06-16
Attending: INTERNAL MEDICINE
Payer: MEDICARE

## 2023-06-16 DIAGNOSIS — E11.9 DM (DIABETES MELLITUS) (HCC): ICD-10-CM

## 2023-06-16 DIAGNOSIS — I10 HTN (HYPERTENSION): ICD-10-CM

## 2023-06-16 DIAGNOSIS — E78.2 HYPERLIPIDEMIA, MIXED: ICD-10-CM

## 2023-06-16 DIAGNOSIS — I48.0 AF (PAROXYSMAL ATRIAL FIBRILLATION) (HCC): Primary | ICD-10-CM

## 2023-06-16 LAB
CHOLEST SERPL-MCNC: 108 MG/DL (ref ?–200)
FASTING PATIENT LIPID ANSWER: YES
HDLC SERPL-MCNC: 56 MG/DL (ref 40–59)
LDLC SERPL CALC-MCNC: 39 MG/DL (ref ?–100)
NONHDLC SERPL-MCNC: 52 MG/DL (ref ?–130)
TRIGL SERPL-MCNC: 58 MG/DL (ref 30–149)
VLDLC SERPL CALC-MCNC: 8 MG/DL (ref 0–30)

## 2023-06-16 PROCEDURE — 80061 LIPID PANEL: CPT

## 2023-06-16 PROCEDURE — 36415 COLL VENOUS BLD VENIPUNCTURE: CPT

## 2023-07-18 DIAGNOSIS — E11.42 DIABETIC POLYNEUROPATHY ASSOCIATED WITH TYPE 2 DIABETES MELLITUS (HCC): ICD-10-CM

## 2023-07-19 NOTE — TELEPHONE ENCOUNTER
Requested Prescriptions     Pending Prescriptions Disp Refills    DULOXETINE 30 MG Oral Cap DR Particles [Pharmacy Med Name: DULOXETINE DR 30MG CAPSULES] 90 capsule 0     Sig: TAKE 1 CAPSULE(30 MG) BY MOUTH DAILY         Last OV: 5/13/22  Next OV: 11/1/23  Last refilled: 1/3/23 #90

## 2023-07-20 RX ORDER — DULOXETIN HYDROCHLORIDE 30 MG/1
CAPSULE, DELAYED RELEASE ORAL
Qty: 90 CAPSULE | Refills: 0 | Status: SHIPPED | OUTPATIENT
Start: 2023-07-20

## 2023-08-30 DIAGNOSIS — E11.42 DIABETIC POLYNEUROPATHY ASSOCIATED WITH TYPE 2 DIABETES MELLITUS (HCC): ICD-10-CM

## 2023-08-31 RX ORDER — DULOXETIN HYDROCHLORIDE 30 MG/1
30 CAPSULE, DELAYED RELEASE ORAL DAILY
Qty: 90 CAPSULE | Refills: 0 | Status: SHIPPED | OUTPATIENT
Start: 2023-08-31

## 2023-09-11 NOTE — TELEPHONE ENCOUNTER
Failed Protocol/No Protocol.     Requested Prescriptions   Pending Prescriptions Disp Refills    XARELTO 20 MG Oral Tab [Pharmacy Med Name: Swapnil Salguero 20MG TABLETS] 90 tablet 1     Sig: TAKE 1 TABLET(20 MG) BY MOUTH DAILY WITH FOOD       There is no refill protocol information for this order          Future Appointments         Provider Department Appt Notes    In 1 month DO Chani Kahn, [address], [place] f/u          Recent Outpatient Visits              6 months ago Type 2 diabetes mellitus with diabetic polyneuropathy, with long-term current use of [facility]    Josh Bonilla, Lucita Mills MD    Office Visit    7 months ago Recurrent UTI    [facility], [address], [place], [place]    Office Visit    10 months ago Uncontrolled type 2 diabetes mellitus with hyperglycemia [facility])    Josh Bonilla, Lucita Mills MD    Office Visit    1 year ago Acute stroke due to occlusion of left cerebellar artery [facility])    Chani Mooney, [address], [place], [place]    Office Visit    1 year ago Uncontrolled type 2 diabetes mellitus with hyperglycemia [facility])    Josh Bonilla, Lucita Mills MD    Office Visit

## 2023-09-12 RX ORDER — METHENAMINE HIPPURATE 1000 MG/1
1 TABLET ORAL 2 TIMES DAILY
Qty: 90 TABLET | Refills: 3 | Status: SHIPPED | OUTPATIENT
Start: 2023-09-12

## 2024-07-03 ENCOUNTER — TELEPHONE (OUTPATIENT)
Dept: FAMILY MEDICINE CLINIC | Facility: CLINIC | Age: 79
End: 2024-07-03

## 2024-07-03 NOTE — TELEPHONE ENCOUNTER
Patient is due for annual physical with Dr. Valderrama. Left message to call back. If patient returns call, please assist scheduling appointment.

## 2025-01-20 ENCOUNTER — TELEPHONE (OUTPATIENT)
Dept: FAMILY MEDICINE CLINIC | Facility: CLINIC | Age: 80
End: 2025-01-20

## 2025-01-20 NOTE — TELEPHONE ENCOUNTER
Patient called and is asking if  can write a letter for her stating that she has diabetes and has been a patient with , patient said she needs letter for court.     Patient asked if letter can be mailed to her, Confirmed address with the patient     [address] [place] [address]

## 2025-02-17 ENCOUNTER — TELEPHONE (OUTPATIENT)
Dept: FAMILY MEDICINE CLINIC | Facility: CLINIC | Age: 80
End: 2025-02-17

## 2025-02-17 NOTE — TELEPHONE ENCOUNTER
Patient is calling to request the letter requested and issued by Dr. Valderrama be mailed to her.  Patient mentions it has not been received.    Please see closed telephone encounter of 1/20/25.

## 2025-02-25 NOTE — TELEPHONE ENCOUNTER
Dont know what else to suggest other then patient come to office and . Letter has been mailed out 2x's. Usually it can take up to 2 weeks to receive. Please advise pt if she would like to wait another week or may stop by  office to p/u.

## 2025-02-25 NOTE — TELEPHONE ENCOUNTER
Talked to patient via language line in Polish told her message below understood and she is coming tomorrow and  the letter.

## (undated) NOTE — LETTER
1/28/2025        Carrie Encarnacion Fiorella        [address] [facility] [address]         To Whom It May Concern,    Carrie MATUTE Fiorella is currently under my medical care. She has type 2 diabetic.     Please contact our office with any question or concerns.     Sincerely,       REY BENTLEY MD  [address] [place] [id number]  Ph: [phone]  Fax: [phone]

## (undated) NOTE — LETTER
01/09/19        [facility] [address]      Dear Satnam Adams records indicate that you have outstanding lab work and or testing that was ordered for you and has not yet been completed:  Orders Placed This Encounter

## (undated) NOTE — LETTER
[facility] SURGERY SCHEDULING FORM   [address] [facility]   [phone] (scheduling phone) [phone] (scheduling fax)     PATIENT INFORMATION   Last Name:      Rd Muniz      First Name:    Leticia Wright []  General   []  Choice  []  [place]  []  Regional/            []  Spinal  []  No Anesthesia   [x]  Yes  []  No or using our own   Allergies:  Other         Completed by:   Allie Andrews      Date:   1/11/2019

## (undated) NOTE — LETTER
AUTHORIZATION FOR SURGICAL OPERATION OR OTHER PROCEDURE    1.  I hereby authorize Dr. Aleksandr Yost and the [facility] staff assigned to my case to perform the following operation and/or procedure at the [facility]:    LEFT knee arthrocentesis under ultrasound Relationship to Patient:           []  Parent    Responsible person                          []  Spouse  In case of minor or                    [] Other  _____________   Incompetent name:  __________________________________________________

## (undated) NOTE — Clinical Note
Thank you for the consult. I saw Ms. Jerzy Palacio in the endocrine/diabetes clinic today. Please see attached my note. Please feel free to contact me with any questions. Thanks!

## (undated) NOTE — LETTER
AUTHORIZATION FOR SURGICAL OPERATION OR OTHER PROCEDURE    1.  I hereby authorize Dr. Carmencita Zhang and the [facility] staff assigned to my case to perform the following operation and/or procedure at the [facility]:    BILATERAL Synvisc once with corticosteroi Patient signature:  ___________________________________________________             Relationship to Patient:           []  Parent    Responsible person                          []  Spouse  In case of minor or                    [] Other  _____________   In

## (undated) NOTE — IP AVS SNAPSHOT
[facility]            (For Outpatient Use Only) Initial Admit Date: 6/19/2021   Inpt/Obs Admit Date: Inpt: 6/20/21 / Obs: 06/19/21   Discharge Date:    Link Pattee:  [de-identified]   MRN: [de-identified]   CSN: [id number]   CEID: [id number] :    Subscriber ID:  Pt Rel to Subscriber:    Hospital Account Financial Class: Medicare    2021

## (undated) NOTE — ED AVS SNAPSHOT
Gia Sanchez   MRN: [id number]    Department:  [facility] Emergency Department   Date of Visit:  11/30/2018           Disclosure     Insurance plans vary and the physician(s) referred by the ER may not be covered by your plan.  Please cont within the next three months to obtain basic health screening including reassessment of your blood pressure.     IF THERE IS ANY CHANGE OR WORSENING OF YOUR CONDITION, CALL YOUR PRIMARY CARE PHYSICIAN AT ONCE OR RETURN IMMEDIATELY TO THE EMERGENCY DEPARTMEN

## (undated) NOTE — LETTER
ZAID Notifier: Willie/Vivebio   PAUL Patient Name: Jamir Bunch. Identification Number: [id number]      Advance Beneficiary Notice of Noncoverage (ABN)  NOTE:  If Medicare doesn’t pay for D.RIGHT knee CSI under U/Sbelow, you may have to pay.   [facility] ? OPTION 2. I want the D.RIGHT knee CSI under U/Slisted above, but do not bill Medicare. You may ask to be paid now as I am responsible for payment. I cannot appeal if Medicare is not billed. ? OPTION 3. I don’t want the D.listed above.  I understand wit

## (undated) NOTE — LETTER
06/22/21        [address] [address]      Dear Alveta Nissen records indicate that you have outstanding lab work and or testing that was ordered for you and has not yet been completed:  Orders Placed This Encounter

## (undated) NOTE — MR AVS SNAPSHOT
[address]  [address] [facility]  [phone]               Thank you for choosing us for your health care visit with KVNG Molina, JAG.   We are glad to serve you and happy to provide you with authorization, such as [employer], please feel free to schedule your appointment immediately. However, if you are unsure about the requirements for authorization, please wait 5-7 days and then contact your physician's office.  At that time, you will immediately. This could indicate frostbite. If you have vascular disease, diabetes or decreased sensation, talk with your doctor before applying ice. *Compression.  To help stop swelling, compress the area with an elastic bandage until the swelling stops INJECT 20 UNITS UNDER THE SKIN EVERY DAY           Losartan Potassium 50 MG Tabs   Take 1 tablet (50 mg total) by mouth daily.    Commonly known as:  COZAAR           MetFORMIN HCl 850 MG Tabs   TAKE ONE TABLET BY MOUTH THREE TIMES DAILY WITH MEALS   Common Water is best for hydration Fast food. Eat at home when possible     Tips for increasing your physical activity – Adults who are physically active are less likely to develop some chronic diseases than adults who are inactive.      HOW TO GET STARTED: HOW

## (undated) NOTE — LETTER
PRERNA. Notifier: [employer]   MAYRA. Patient Name: Kirby Cowart. Identification Number: [id number]      Advance Beneficiary Notice of Noncoverage (ABN)  NOTE:  If Medicare doesn’t pay for D.left knee steroid injectionbelow, you may have to pay. not bill Medicare. You may ask to be paid now as I am responsible for payment. I cannot appeal if Medicare is not billed. ? OPTION 3. I don’t want the D.listed above.  I understand with this choice  I am not responsible for payment, and I cannot appeal to

## (undated) NOTE — LETTER
September 17, 2019    Abilio Randall MD  [address] [place] [address]  [address] [facility]     Patient: Keith Yamilka   YOB: 1945   Date of Visit: 9/17/2019       Dear Dr. Kristal Andrews MD:    Thank you for referring Samy Ferrera to me for hysterectomy (2014) (TLH/BSO/ A&P repair/ uteroscral spine fixation); other surgical history (Right, 2012) (Arthroscopy); other surgical history (1995) (Open cholecystectomy); arthroscopy, shoulder, surgi (2015 rotator cufff repain); arthroscopy, shoulder, metoprolol Tartrate 25 MG Oral Tab Take 1 tablet (25 mg total) by mouth 2x Daily(Beta Blocker).  Disp: 180 tablet Rfl: 1   Insulin Pen Needle (BD PEN NEEDLE ULTRAFINE) 29G X 12.7MM Does not apply Misc USE WITH INSULIN PEN AS DIRECTED ONCE DAILY Disp: 100 ea Meibomian gland dysfunction    Conjunctiva/Sclera Nasal/temp pinguecula Nasal/temp pinguecula    Cornea Clear Clear    Anterior Chamber Deep and quiet Deep and quiet    Iris Normal Normal    Lens 2-3+ Nuclear sclerosis, Nasal 2+ Cortical cataract, 3+ [address] [place] [address] Ophthalmology was offered. Patient agrees to be referred, so we will send complete exam and information to them and the patient will be contacted. Information on [address] [place] [address] ophthalmology given and reviewed with the patient.         Type 2 diabetes luna

## (undated) NOTE — LETTER
AUTHORIZATION FOR SURGICAL OPERATION OR OTHER PROCEDURE    1.  I hereby authorize Dr. Jason Shields and the [facility] staff assigned to my case to perform the following operation and/or procedure at the [facility]:    Right knee corticosteroid injection under Patient signature:  ___________________________________________________             Relationship to Patient:           []  Parent    Responsible person                          []  Spouse  In case of minor or                    [] Other  _____________   In

## (undated) NOTE — Clinical Note
EBONIE, TCM call made, see notes.  EDGAR confirmed with Ren Seth that Dutch Joiner has a HFU on 8/2/2021 at 11:00 am.

## (undated) NOTE — LETTER
July 26, 2017    Gilson Black, [address] [facility]  [address]  [address]     Patient: cJ Klein   YOB: 1945   Date of Visit: 7/26/2017       Dear Dr. Terrence Paige MD:    Thank you for referring Cas Saini to me for evalu (1995) (Open cholecystectomy); arthroscopy, shoulder, surgi (2015 rotator cufff repain); arthroscopy, shoulder, surgical; w/rotator cuff repair (Right, 5/27/2015) (Procedure: ARTHROSCOPY SHOULDER WITH ROTATOR CUFF REPAIR;  Surgeon: Greogria Johnson MD; aspirin EC 81 MG Oral Tab EC Take 1 tablet by mouth daily.  Disp: 90 tablet Rfl: 4       Allergies:  No Known Allergies    ROS:     ROS     Positive for: Endocrine, Cardiovascular, Eyes    Negative for: Constitutional, Gastrointestinal, Neurological, Skin, hand held over +2.25 OTC's. Suggest +2.50 OTC's/nw                 ASSESSMENT/PLAN:     Diagnoses and Plan:     Glaucoma suspect  Discussed with patient that she is a glaucoma suspect based on increased cupping of the optic nerves in both eyes.   Retinal p

## (undated) NOTE — LETTER
03/19/21        [facility] [address]      Dear Sherly Sandoval records indicate that you have outstanding lab work and or testing that was ordered for you and has not yet been completed:  Orders Placed This Encounter

## (undated) NOTE — LETTER
01/14/21        [address] [address]      Dear Jovanny Kirk records indicate that you have outstanding lab work and or testing that was ordered for you and has not yet been completed:  Orders Placed This Encounter

## (undated) NOTE — MR AVS SNAPSHOT
[address]  [address] [place] [phone]               Thank you for choosing us for your health care visit with Sylvia Jason MD.  We are glad to serve you and happy to provide you with this summa (80.65 kg) 31.50 kg/m2         Current Medications          This list is accurate as of: 4/17/17 11:39 AM.  Always use your most recent med list.                aspirin EC 81 MG Tbec   Take 1 tablet by mouth daily.            azithromycin 250 MG Tabs   Take Sign up for [employer], your secure online medical record. bepretty will allow you to access patient instructions from your recent visit,  view other health information, and more. To sign up or find more information, go to [email or web address] and cl

## (undated) NOTE — MR AVS SNAPSHOT
[address]  [address] [facility]  [phone]               Thank you for choosing us for your health care visit with Allyson Montoya MD.  We are glad to serve you and happy to provide you with this summa Assoc Dx:  Type 2 diabetes mellitus without complication, with long-term current use of insulin (HCC) [E11.9, Z79.4], Secondary hypertension [I15.9], Glaucoma suspect, unspecified laterality [P99.681]          Reason for Today's Visit     Diabetes - Diclofenac Sodium 1 % Gel      You can get these medications from any pharmacy     Bring a paper prescription for each of these medications    - pregabalin 50 MG Caps            Karlie     Sign up for [email or web address], your secure online medical record.   Iagnosis

## (undated) NOTE — IP AVS SNAPSHOT
Patient Demographics     Address  [address] [place] [address] Phone  [phone] [facility])  [phone] (Mobile) *Preferred*      Emergency Contact(s)     Name Relation Home Work  Omid Ledesma [phone]  [address] [place] [address] Ciprofloxacin HCl 500 MG Tabs  Commonly known as: CIPRO  Next dose due: This evening      Take 1 tablet (500 mg total) by mouth 2 (two) times daily for 7 days.   Stop taking on: June 30, 2021   Afsaneh Watkins MD         Diclofenac Sodium 1 % Gel  Commonly k Jessica Mckeon MD         [place] FlexTouch 200 UNIT/ML Sopn  Generic drug: Insulin Degludec  Next dose due: tonight      Use 20 units Q hs and 5 units Q am   Leidy Albert MD               Where to Get Your Medications      These medications were sent to [facility] 06/22/21 1223 Given            LEFT UPPER ARM     Order ID Medication Name Action Time Action Reason Comments    [id number] Insulin Aspart Pen (NOVOLOG) 100 UNIT/ML flexpen 1-7 Units 06/22/21 1700 Given      [id number] Insulin Aspart Pen (NOVOLOG) 100 UNIT/M Escherichia coli    Susceptibility      Escherichia coli      Not Specified     Ampicillin >=32  Resistant     Ampicillin + Sulbactam 8  Sensitive     Cefazolin <=4  Sensitive     Ciprofloxacin <=0.25  Sensitive     Gentamicin <=1  Sensitive     Levofloxac retinopathy   • High blood pressure    • History of pregnancy      x3 (max 10 lbs), SAB x1   • Hypercholesterolemia    • Lipid screening 2014   • Meniscus tear     Foreign body, meniscus tear   • Osteoporosis screening 2013    Dexa Scan Vaping Use      Vaping Use: Never used    Alcohol use: No    Drug use: No    Allergies/Medications:    Allergies:   Seasonal                OTHER (SEE COMMENTS)    Comment:Trees, pollen, hayfever  naproxen 250 MG Oral Tab, Take 250 mg by mouth 2 (two) times breastfeeding. GENERAL:  The patient appeared to be in some distress from headache  SKIN:  Warm and hydrated  PSYCHIATRIC: Calm and cooperative   HEENT:  Head was atraumatic and normocephalic. Eyes:  Extraocular muscles were intact.   Sclera was anicte No change Electronically signed on 06/19/2021 at 11:40 by Xiomy Maloney MD        A/P    Vertigo, gait abnormality, generalized weakness   Rule out central cause, cerebellar infarct   -MRI brain  -PT OT eval  -permissive hypertension pending MRI read  -IVF infarct on 6/19/2021. Per neurology consult home Xarelto held on 6/20. Aspirin 325 mg x 1 then aspirin 81 mg daily. Speech therapy consulted evaluation negative for aphasia and discharged from speech-language services.   Bedside dysphagia evaluation comp CUFF REPAIR;  Surgeon: Patrick Ang MD;  Location: [facility]   • CATARACT EXTRACTION W/  INTRAOCULAR LENS IMPLANT Left 12/12/2019    Dr. Katharina Stephens @ [facility]   • CATARACT EXTRACTION W/  INTRAOCULAR LENS IMPLANT Right 12/19/2019    Dr. Katharina Stephens @ [facility]   • tobacco: Never Used    Vaping Use      Vaping Use: Never used    Alcohol use: No    Drug use: No      Social History/Living Situation & Prior Functional Status:  HOME SITUATION  Type of Home: House  Home Layout: One level, 3 [address]ART[address]  Lives With: Spouse     Nick directionality intact  Psych: Appropriate affect, cooperative    Data Review:   Recent Labs   Lab 06/19/21  0627 06/20/21  0823   RBC 4.25 4.26   HGB 12.7 12.5   HCT 38.2 38.5   MCV 89.9 90.4   MCH 29.9 29.3   MCHC 33.2 32.5   RDW 12.9 13.4   NEPRELIM 10. 6 Acute pain of both shoulders     Hypertension, unspecified type     Nausea and vomiting, intractability of vomiting not specified, unspecified vomiting type     Vertigo     Acute embolic stroke ([facility] [address]        IMPAIRMENTS:      Activities of daily living, fun potential is good. Discharge goal is home with family at a Bren to supervision with least restrictive assistive device and appropriate adaptive equipment. Will provide family/caregiver education with appropriate discharge plan of care. Will follow. to the ER with complaints of 10/10 headache that radiates into her L neck. She also is complaining of N/V that is not relieved by zofran. Felt somewhat better in ER but started to feel worse again and was admitted for further care.   Family also concerned Specialty    Fanny Marcelino MD  Consulting Physician  Physical Medicine    Danika Davalos DO  Consulting Physician  NEUROLOGY              Discharge Plan:   Discharge Condition: Stable    Current Discharge Medication List    New Orders    atorvastatin 80 Discharge Medications      START taking these medications      Instructions Prescription details   atorvastatin 80 MG Tabs  Commonly known as: LIPITOR      Take 1 tablet (80 mg total) by mouth nightly.    Quantity: 30 tablet  Refills: 0     Ciprofloxacin H (120 mg total) by mouth daily.    Quantity: 30 tablet  Refills: 5     Insulin Pen Needle 29G X 12.7MM Misc  Commonly known as: BD Pen Needle Original U/F      USE WITH INSULIN PEN AS DIRECTED ONCE DAILY   Quantity: 100 each  Refills: 0     BD Pen Needle Tomy Follow up Labs and imaging: Other Discharge Instructions:       Diabetes: Your A1C level is greater than 8%.   It is recommended that you attend an outpatient diabetes education program.  Please discuss with your Primary Care Provider perform supine->sit with Min A needed for trunk. Pt needed mod A of 2 for SPT to the recliner, pt guarded with transfer and appeared apprehensive with movement.  Once sitting pt was able to perform sit to stand with mod A of 2 up to the RW and ambulated X10 blankets)?: A Little   -   Sitting down on and standing up from a chair with arms (e.g., wheelchair, bedside commode, etc.): A Lot   -   Moving from lying on back to sitting on the side of the bed?: A Little   How much help from another person does the pat Note signed by Brock Bhatti PT at 6/21/2021  2:54 PM  Version 1 of 1    Author: Brock Bhatti PT Service: Rehab Author Type: Physical Therapist    Filed: 6/21/2021  2:54 PM Date of Service: 6/21/2021 11:00 AM Status: Signed    :  Dahiana Horvath system to help facilitate return to independence; additionally she is highly motivated and would tolerate 3 hours of therapy daily to address new cerebellar deficits post CVA.      Patient will benefit from continued IP PT services to address these deficits [facility]   • CATARACT EXTRACTION W/  INTRAOCULAR LENS IMPLANT Left 12/12/2019    Dr. Marium Adamson @ [facility]   • CATARACT EXTRACTION W/  INTRAOCULAR LENS IMPLANT Right 12/19/2019    Dr. Marium Adamson @ [address] [place] [address]     • COLONOSCOPY N/A 1/12/2017    Procedure: C help from another person does the patient currently need. ..   -   Moving to and from a bed to a chair (including a wheelchair)?: A Lot   -   Need to walk in hospital room?: A Lot   -   Climbing 3-5 steps with a railing?: Total     AM-PAC Score:  Raw Score: Author: Macario Arnold OT Service: — Author Type: Occupational Therapist    Filed: 6/22/2021  4:58 PM Date of Service: 6/22/2021 10:25 AM Status: Signed    : Macario Arnold OT (Occupational Therapist)       OCCUPATIONAL THERAPY TREATMENT NOTE - continued IP rehab in a acute setting    DISCHARGE RECOMMENDATIONS[YOVANA.1]  OT Discharge Recommendations: Acute rehabilitation  OT Device Recommendations: TBD[YOVANA.2]     PLAN[YOVANA.1]  OT Treatment Plan: Compensatory technique education;Patient/Family education; with SBA    Comment:    Patient will complete item retrieval with SBA   Comment:          Goals  on: 21  Frequency: 5x week[YOVANA.1]     Attribution Valle    YOVANA. 1 - Kumar Cristobal OT on 2021  4:48 PM  YOVANA. 2 - Kumar Cristobal OT on 2021 rehab recommendations with patient and family who are agreeable. Functional Mobility:   Patient was able to complete bed mobility with Min a .  Tolerated seated EOB  with SBA-CGA assistance;  STS completed from EOB to bedside chair with Mod A X2; patien no retinopathy   • High blood pressure    • History of pregnancy      x3 (max 10 lbs), SAB x1   • Hypercholesterolemia    • Lipid screening 2014   • Meniscus tear     Foreign body, meniscus tear   • Osteoporosis screening 2013    Dexa Sc OBJECTIVE[AA.1]  Precautions:  needed  Fall Risk: High fall risk[AA.2]    PAIN ASSESSMENT[AA.1]  Rating:  (headache)[AA. 2]          ACTIVITY TOLERANCE[AA.1]  Pulse: (!) 122[AA. 2]                      O2 SATURATIONS[AA.1]  Oxygen Therapy  SPO rehab      Patient will complete functional transfer with SBA   Comment:     Patient will complete toileting with SBA   Comment:     Patient will tolerate standing for 3-5 minutes in prep for adls with SBA    Comment:    Patient will complete item retrieva Repetition                                              9/10                 Object naming                                    10/10                   WAB Score                                         58/60    Pt demonstrated functional comprehensi INFLUENZA 10/08/13     INFLUENZA 10/08/13     INFLUENZA 10/13/09     INFLUENZA 10/13/09     INFLUENZA 12/04/07     Pneumococcal (Prevnar 13) 12/15/15     Pneumococcal (Prevnar 13) 11/10/15     Pneumococcal (Prevnar 13) 08/03/10     Pneumovax 23 08/03/10

## (undated) NOTE — LETTER
AUTHORIZATION FOR SURGICAL OPERATION OR OTHER PROCEDURE    1.  I hereby authorize Dr. Dayna Tillman  and the [facility] staff assigned to my case to perform the following operation and/or procedure at the [facility]:    _Left knee steroid injection  _______________ Patient signature:  ___________________________________________________             Relationship to Patient:           []  Parent    Responsible person                          []  Spouse  In case of minor or                    [] Other  _____________   In

## (undated) NOTE — LETTER
No referring provider defined for this encounter.        08/23/21        Patient: Lori Oliva   YOB: 1945   Date of Visit: 8/23/2021       Dear  Dr. Cherie West MD,    I evaluated  Lori Oliva in the office today and also perform cys patient and daughter, Lionel Bender. Febrile UTI  During 07/21/2021-07/26/2021 hospitalization, the patient spiked a fever; 7/23/2021 at midnight T-max 101. 2.  She developed constant suprapubic pain, mild to moderate, dull, waxing and waning; worse with urinat stones, no hydronephrosis and the findings supporting ascending urinary tract infection including pyelonephritis; urine culture came back E. coli ESBL; infectious disease Dr. Frank Marshall switched patient from IV ceftriaxone to IV meropenem; resolution of feve Escherichia coli; UA blood = negative; protein = negative   09/23/2019 Creatinine = 0.76; GFR = 78  06/10/2019 Creatinine = 0.66; GFR = 88  05/07/2019 Urine culture = >100,000 CFU/ML Escherichia coli; UA blood = negative; protein = negative   01/11/2019 Ur to 14 days. On 7/23/2021 CT ABDOMEN + PELVIS KIDNEYSTONE 2D RNDR = no urinary tract stones, no hydronephrosis but signs were consistent with cystitis with ascending urinary tract infection along with pyelonephritis.  On 08/18/2021 Urine culture  = no growth 4.  Visit with me in January 2022; please use standing order and submit complete urinalysis and urine culture 3--5 days BEFORE visit with me; we would review the results during the office visit             Document electronically generated by:  Judy Alas